# Patient Record
Sex: MALE | Race: WHITE | NOT HISPANIC OR LATINO | URBAN - METROPOLITAN AREA
[De-identification: names, ages, dates, MRNs, and addresses within clinical notes are randomized per-mention and may not be internally consistent; named-entity substitution may affect disease eponyms.]

---

## 2022-03-19 ENCOUNTER — INPATIENT (INPATIENT)
Facility: HOSPITAL | Age: 67
LOS: 10 days | Discharge: HOME HEALTH | DRG: 233 | End: 2022-03-30
Attending: THORACIC SURGERY (CARDIOTHORACIC VASCULAR SURGERY) | Admitting: INTERNAL MEDICINE
Payer: MEDICARE

## 2022-03-19 ENCOUNTER — EMERGENCY (EMERGENCY)
Facility: HOSPITAL | Age: 67
LOS: 1 days | Discharge: ANOTHER HOSPITAL | End: 2022-03-19
Attending: EMERGENCY MEDICINE | Admitting: EMERGENCY MEDICINE
Payer: MEDICARE

## 2022-03-19 VITALS
OXYGEN SATURATION: 99 % | HEIGHT: 71 IN | HEART RATE: 121 BPM | WEIGHT: 218.04 LBS | RESPIRATION RATE: 16 BRPM | DIASTOLIC BLOOD PRESSURE: 78 MMHG | TEMPERATURE: 98 F | SYSTOLIC BLOOD PRESSURE: 112 MMHG

## 2022-03-19 VITALS
HEIGHT: 71 IN | DIASTOLIC BLOOD PRESSURE: 62 MMHG | OXYGEN SATURATION: 94 % | WEIGHT: 225.97 LBS | RESPIRATION RATE: 16 BRPM | SYSTOLIC BLOOD PRESSURE: 80 MMHG | HEART RATE: 128 BPM

## 2022-03-19 VITALS
SYSTOLIC BLOOD PRESSURE: 115 MMHG | OXYGEN SATURATION: 99 % | RESPIRATION RATE: 18 BRPM | HEART RATE: 122 BPM | DIASTOLIC BLOOD PRESSURE: 77 MMHG | TEMPERATURE: 99 F

## 2022-03-19 DIAGNOSIS — R57.0 CARDIOGENIC SHOCK: ICD-10-CM

## 2022-03-19 DIAGNOSIS — I21.3 ST ELEVATION (STEMI) MYOCARDIAL INFARCTION OF UNSPECIFIED SITE: ICD-10-CM

## 2022-03-19 DIAGNOSIS — I25.10 ATHEROSCLEROTIC HEART DISEASE OF NATIVE CORONARY ARTERY WITHOUT ANGINA PECTORIS: ICD-10-CM

## 2022-03-19 LAB
ALBUMIN SERPL ELPH-MCNC: 4.2 G/DL — SIGNIFICANT CHANGE UP (ref 3.3–5)
ALP SERPL-CCNC: 97 U/L — SIGNIFICANT CHANGE UP (ref 30–120)
ALT FLD-CCNC: 63 U/L DA — HIGH (ref 10–60)
ANION GAP SERPL CALC-SCNC: 15 MMOL/L — SIGNIFICANT CHANGE UP (ref 5–17)
APPEARANCE UR: CLEAR — SIGNIFICANT CHANGE UP
APTT BLD: 26.4 SEC — LOW (ref 27.5–35.5)
APTT BLD: 31.2 SEC — SIGNIFICANT CHANGE UP (ref 27.5–35.5)
AST SERPL-CCNC: 207 U/L — HIGH (ref 10–40)
BACTERIA # UR AUTO: NEGATIVE — SIGNIFICANT CHANGE UP
BASE EXCESS BLDMV CALC-SCNC: -2.8 MMOL/L — SIGNIFICANT CHANGE UP (ref -3–3)
BASE EXCESS BLDMV CALC-SCNC: -8.2 MMOL/L — LOW (ref -3–3)
BASE EXCESS BLDMV CALC-SCNC: 0.5 MMOL/L — SIGNIFICANT CHANGE UP (ref -3–3)
BASE EXCESS BLDMV CALC-SCNC: 4.7 MMOL/L — HIGH (ref -3–3)
BASOPHILS # BLD AUTO: 0.06 K/UL — SIGNIFICANT CHANGE UP (ref 0–0.2)
BASOPHILS NFR BLD AUTO: 0.3 % — SIGNIFICANT CHANGE UP (ref 0–2)
BILIRUB SERPL-MCNC: 0.8 MG/DL — SIGNIFICANT CHANGE UP (ref 0.2–1.2)
BILIRUB UR-MCNC: NEGATIVE — SIGNIFICANT CHANGE UP
BLD GP AB SCN SERPL QL: NEGATIVE — SIGNIFICANT CHANGE UP
BUN SERPL-MCNC: 11 MG/DL — SIGNIFICANT CHANGE UP (ref 7–23)
CALCIUM SERPL-MCNC: 9.2 MG/DL — SIGNIFICANT CHANGE UP (ref 8.4–10.5)
CHLORIDE SERPL-SCNC: 90 MMOL/L — LOW (ref 96–108)
CK MB BLD-MCNC: 6 % — HIGH (ref 0–3.5)
CK MB CFR SERPL CALC: 96.1 NG/ML — HIGH (ref 0–6.7)
CK SERPL-CCNC: 1593 U/L — HIGH (ref 30–200)
CO2 BLDMV-SCNC: 19 MMOL/L — LOW (ref 21–29)
CO2 BLDMV-SCNC: 23 MMOL/L — SIGNIFICANT CHANGE UP (ref 21–29)
CO2 BLDMV-SCNC: 27 MMOL/L — SIGNIFICANT CHANGE UP (ref 21–29)
CO2 BLDMV-SCNC: 30 MMOL/L — HIGH (ref 21–29)
CO2 SERPL-SCNC: 22 MMOL/L — SIGNIFICANT CHANGE UP (ref 22–31)
COLOR SPEC: SIGNIFICANT CHANGE UP
CREAT SERPL-MCNC: 0.99 MG/DL — SIGNIFICANT CHANGE UP (ref 0.5–1.3)
D DIMER BLD IA.RAPID-MCNC: <150 NG/ML DDU — SIGNIFICANT CHANGE UP
DIFF PNL FLD: ABNORMAL
EGFR: 84 ML/MIN/1.73M2 — SIGNIFICANT CHANGE UP
EOSINOPHIL # BLD AUTO: 0 K/UL — SIGNIFICANT CHANGE UP (ref 0–0.5)
EOSINOPHIL NFR BLD AUTO: 0 % — SIGNIFICANT CHANGE UP (ref 0–6)
EPI CELLS # UR: 1 /HPF — SIGNIFICANT CHANGE UP
FIBRINOGEN PPP-MCNC: 603 MG/DL — HIGH (ref 330–520)
GAS PNL BLDA: SIGNIFICANT CHANGE UP
GAS PNL BLDMV: SIGNIFICANT CHANGE UP
GLUCOSE BLDC GLUCOMTR-MCNC: 290 MG/DL — HIGH (ref 70–99)
GLUCOSE BLDC GLUCOMTR-MCNC: 338 MG/DL — HIGH (ref 70–99)
GLUCOSE BLDC GLUCOMTR-MCNC: 383 MG/DL — HIGH (ref 70–99)
GLUCOSE BLDC GLUCOMTR-MCNC: 427 MG/DL — HIGH (ref 70–99)
GLUCOSE SERPL-MCNC: 401 MG/DL — HIGH (ref 70–99)
GLUCOSE UR QL: ABNORMAL
HCO3 BLDMV-SCNC: 18 MMOL/L — LOW (ref 20–28)
HCO3 BLDMV-SCNC: 22 MMOL/L — SIGNIFICANT CHANGE UP (ref 20–28)
HCO3 BLDMV-SCNC: 25 MMOL/L — SIGNIFICANT CHANGE UP (ref 20–28)
HCO3 BLDMV-SCNC: 29 MMOL/L — HIGH (ref 20–28)
HCT VFR BLD CALC: 51.3 % — HIGH (ref 39–50)
HGB BLD-MCNC: 16.8 G/DL — SIGNIFICANT CHANGE UP (ref 13–17)
HOROWITZ INDEX BLDMV+IHG-RTO: 36 — SIGNIFICANT CHANGE UP
HYALINE CASTS # UR AUTO: 2 /LPF — SIGNIFICANT CHANGE UP (ref 0–2)
IMM GRANULOCYTES NFR BLD AUTO: 0.3 % — SIGNIFICANT CHANGE UP (ref 0–1.5)
INR BLD: 1.19 RATIO — HIGH (ref 0.88–1.16)
INR BLD: 1.27 RATIO — HIGH (ref 0.88–1.16)
KETONES UR-MCNC: ABNORMAL
LEUKOCYTE ESTERASE UR-ACNC: NEGATIVE — SIGNIFICANT CHANGE UP
LYMPHOCYTES # BLD AUTO: 14.9 % — SIGNIFICANT CHANGE UP (ref 13–44)
LYMPHOCYTES # BLD AUTO: 2.75 K/UL — SIGNIFICANT CHANGE UP (ref 1–3.3)
MAGNESIUM SERPL-MCNC: 2 MG/DL — SIGNIFICANT CHANGE UP (ref 1.6–2.6)
MCHC RBC-ENTMCNC: 29 PG — SIGNIFICANT CHANGE UP (ref 27–34)
MCHC RBC-ENTMCNC: 32.7 GM/DL — SIGNIFICANT CHANGE UP (ref 32–36)
MCV RBC AUTO: 88.4 FL — SIGNIFICANT CHANGE UP (ref 80–100)
MONOCYTES # BLD AUTO: 0.89 K/UL — SIGNIFICANT CHANGE UP (ref 0–0.9)
MONOCYTES NFR BLD AUTO: 4.8 % — SIGNIFICANT CHANGE UP (ref 2–14)
MRSA PCR RESULT.: SIGNIFICANT CHANGE UP
NEUTROPHILS # BLD AUTO: 14.66 K/UL — HIGH (ref 1.8–7.4)
NEUTROPHILS NFR BLD AUTO: 79.7 % — HIGH (ref 43–77)
NITRITE UR-MCNC: NEGATIVE — SIGNIFICANT CHANGE UP
NRBC # BLD: 0 /100 WBCS — SIGNIFICANT CHANGE UP (ref 0–0)
NT-PROBNP SERPL-SCNC: 6715 PG/ML — HIGH (ref 0–300)
O2 CT VFR BLD CALC: 33 MMHG — SIGNIFICANT CHANGE UP (ref 30–65)
O2 CT VFR BLD CALC: 39 MMHG — SIGNIFICANT CHANGE UP (ref 30–65)
O2 CT VFR BLD CALC: 41 MMHG — SIGNIFICANT CHANGE UP (ref 30–65)
O2 CT VFR BLD CALC: 41 MMHG — SIGNIFICANT CHANGE UP (ref 30–65)
PCO2 BLDMV: 37 MMHG — SIGNIFICANT CHANGE UP (ref 30–65)
PCO2 BLDMV: 38 MMHG — SIGNIFICANT CHANGE UP (ref 30–65)
PCO2 BLDMV: 41 MMHG — SIGNIFICANT CHANGE UP (ref 30–65)
PCO2 BLDMV: 42 MMHG — SIGNIFICANT CHANGE UP (ref 30–65)
PH BLDMV: 7.28 — LOW (ref 7.32–7.45)
PH BLDMV: 7.38 — SIGNIFICANT CHANGE UP (ref 7.32–7.45)
PH BLDMV: 7.4 — SIGNIFICANT CHANGE UP (ref 7.32–7.45)
PH BLDMV: 7.45 — SIGNIFICANT CHANGE UP (ref 7.32–7.45)
PH UR: 6 — SIGNIFICANT CHANGE UP (ref 5–8)
PHOSPHATE SERPL-MCNC: 4.5 MG/DL — SIGNIFICANT CHANGE UP (ref 2.5–4.5)
PLATELET # BLD AUTO: 190 K/UL — SIGNIFICANT CHANGE UP (ref 150–400)
POTASSIUM SERPL-MCNC: 4.2 MMOL/L — SIGNIFICANT CHANGE UP (ref 3.5–5.3)
POTASSIUM SERPL-SCNC: 4.2 MMOL/L — SIGNIFICANT CHANGE UP (ref 3.5–5.3)
PROT SERPL-MCNC: 9 G/DL — HIGH (ref 6–8.3)
PROT UR-MCNC: ABNORMAL
PROTHROM AB SERPL-ACNC: 13 SEC — SIGNIFICANT CHANGE UP (ref 10.5–13.4)
PROTHROM AB SERPL-ACNC: 14.8 SEC — HIGH (ref 10.5–13.4)
RBC # BLD: 5.8 M/UL — SIGNIFICANT CHANGE UP (ref 4.2–5.8)
RBC # FLD: 12.7 % — SIGNIFICANT CHANGE UP (ref 10.3–14.5)
RBC CASTS # UR COMP ASSIST: 4 /HPF — SIGNIFICANT CHANGE UP (ref 0–4)
RH IG SCN BLD-IMP: POSITIVE — SIGNIFICANT CHANGE UP
RH IG SCN BLD-IMP: POSITIVE — SIGNIFICANT CHANGE UP
S AUREUS DNA NOSE QL NAA+PROBE: SIGNIFICANT CHANGE UP
SAO2 % BLDMV: 58.8 — LOW (ref 60–90)
SAO2 % BLDMV: 65.7 — SIGNIFICANT CHANGE UP (ref 60–90)
SAO2 % BLDMV: 71 — SIGNIFICANT CHANGE UP (ref 60–90)
SAO2 % BLDMV: 76.1 — SIGNIFICANT CHANGE UP (ref 60–90)
SARS-COV-2 RNA SPEC QL NAA+PROBE: SIGNIFICANT CHANGE UP
SODIUM SERPL-SCNC: 127 MMOL/L — LOW (ref 135–145)
SP GR SPEC: 1.04 — HIGH (ref 1.01–1.02)
TROPONIN I, HIGH SENSITIVITY RESULT: HIGH NG/L
UROBILINOGEN FLD QL: NEGATIVE — SIGNIFICANT CHANGE UP
WBC # BLD: 18.42 K/UL — HIGH (ref 3.8–10.5)
WBC # FLD AUTO: 18.42 K/UL — HIGH (ref 3.8–10.5)
WBC UR QL: 2 /HPF — SIGNIFICANT CHANGE UP (ref 0–5)

## 2022-03-19 PROCEDURE — 99152 MOD SED SAME PHYS/QHP 5/>YRS: CPT

## 2022-03-19 PROCEDURE — 99291 CRITICAL CARE FIRST HOUR: CPT

## 2022-03-19 PROCEDURE — 93010 ELECTROCARDIOGRAM REPORT: CPT

## 2022-03-19 PROCEDURE — 71045 X-RAY EXAM CHEST 1 VIEW: CPT | Mod: 26,77

## 2022-03-19 PROCEDURE — 93460 R&L HRT ART/VENTRICLE ANGIO: CPT | Mod: 26

## 2022-03-19 PROCEDURE — 36556 INSERT NON-TUNNEL CV CATH: CPT | Mod: 59

## 2022-03-19 PROCEDURE — 71045 X-RAY EXAM CHEST 1 VIEW: CPT | Mod: 26

## 2022-03-19 PROCEDURE — 33967 INSERT I-AORT PERCUT DEVICE: CPT

## 2022-03-19 PROCEDURE — 93321 DOPPLER ECHO F-UP/LMTD STD: CPT | Mod: 26

## 2022-03-19 PROCEDURE — 93503 INSERT/PLACE HEART CATHETER: CPT

## 2022-03-19 PROCEDURE — 99291 CRITICAL CARE FIRST HOUR: CPT | Mod: 25

## 2022-03-19 PROCEDURE — 36620 INSERTION CATHETER ARTERY: CPT

## 2022-03-19 PROCEDURE — 93308 TTE F-UP OR LMTD: CPT | Mod: 26

## 2022-03-19 RX ORDER — CHLORHEXIDINE GLUCONATE 213 G/1000ML
1 SOLUTION TOPICAL
Refills: 0 | Status: DISCONTINUED | OUTPATIENT
Start: 2022-03-19 | End: 2022-03-23

## 2022-03-19 RX ORDER — DEXTROSE 50 % IN WATER 50 %
25 SYRINGE (ML) INTRAVENOUS
Refills: 0 | Status: DISCONTINUED | OUTPATIENT
Start: 2022-03-19 | End: 2022-03-22

## 2022-03-19 RX ORDER — MAGNESIUM SULFATE 500 MG/ML
2 VIAL (ML) INJECTION ONCE
Refills: 0 | Status: COMPLETED | OUTPATIENT
Start: 2022-03-19 | End: 2022-03-19

## 2022-03-19 RX ORDER — DOBUTAMINE HCL 250MG/20ML
0.5 VIAL (ML) INTRAVENOUS
Qty: 500 | Refills: 0 | Status: DISCONTINUED | OUTPATIENT
Start: 2022-03-19 | End: 2022-03-20

## 2022-03-19 RX ORDER — MORPHINE SULFATE 50 MG/1
2 CAPSULE, EXTENDED RELEASE ORAL ONCE
Refills: 0 | Status: DISCONTINUED | OUTPATIENT
Start: 2022-03-19 | End: 2022-03-19

## 2022-03-19 RX ORDER — ATORVASTATIN CALCIUM 80 MG/1
80 TABLET, FILM COATED ORAL AT BEDTIME
Refills: 0 | Status: DISCONTINUED | OUTPATIENT
Start: 2022-03-19 | End: 2022-03-23

## 2022-03-19 RX ORDER — LIDOCAINE HCL 20 MG/ML
5 VIAL (ML) INJECTION ONCE
Refills: 0 | Status: COMPLETED | OUTPATIENT
Start: 2022-03-19 | End: 2022-03-19

## 2022-03-19 RX ORDER — HEPARIN SODIUM 5000 [USP'U]/ML
4000 INJECTION INTRAVENOUS; SUBCUTANEOUS EVERY 6 HOURS
Refills: 0 | Status: DISCONTINUED | OUTPATIENT
Start: 2022-03-19 | End: 2022-03-22

## 2022-03-19 RX ORDER — SODIUM CHLORIDE 9 MG/ML
3 INJECTION INTRAMUSCULAR; INTRAVENOUS; SUBCUTANEOUS EVERY 8 HOURS
Refills: 0 | Status: DISCONTINUED | OUTPATIENT
Start: 2022-03-19 | End: 2022-03-22

## 2022-03-19 RX ORDER — SODIUM CHLORIDE 9 MG/ML
10 INJECTION INTRAMUSCULAR; INTRAVENOUS; SUBCUTANEOUS
Refills: 0 | Status: DISCONTINUED | OUTPATIENT
Start: 2022-03-19 | End: 2022-03-20

## 2022-03-19 RX ORDER — ASPIRIN/CALCIUM CARB/MAGNESIUM 324 MG
81 TABLET ORAL DAILY
Refills: 0 | Status: DISCONTINUED | OUTPATIENT
Start: 2022-03-19 | End: 2022-03-23

## 2022-03-19 RX ORDER — HEPARIN SODIUM 5000 [USP'U]/ML
4000 INJECTION INTRAVENOUS; SUBCUTANEOUS ONCE
Refills: 0 | Status: COMPLETED | OUTPATIENT
Start: 2022-03-19 | End: 2022-03-19

## 2022-03-19 RX ORDER — HEPARIN SODIUM 5000 [USP'U]/ML
INJECTION INTRAVENOUS; SUBCUTANEOUS
Qty: 25000 | Refills: 0 | Status: DISCONTINUED | OUTPATIENT
Start: 2022-03-19 | End: 2022-03-22

## 2022-03-19 RX ORDER — SODIUM BICARBONATE 1 MEQ/ML
50 SYRINGE (ML) INTRAVENOUS ONCE
Refills: 0 | Status: COMPLETED | OUTPATIENT
Start: 2022-03-19 | End: 2022-03-19

## 2022-03-19 RX ORDER — INSULIN HUMAN 100 [IU]/ML
6 INJECTION, SOLUTION SUBCUTANEOUS
Qty: 100 | Refills: 0 | Status: DISCONTINUED | OUTPATIENT
Start: 2022-03-19 | End: 2022-03-23

## 2022-03-19 RX ORDER — TICAGRELOR 90 MG/1
180 TABLET ORAL ONCE
Refills: 0 | Status: COMPLETED | OUTPATIENT
Start: 2022-03-19 | End: 2022-03-19

## 2022-03-19 RX ORDER — POTASSIUM CHLORIDE 20 MEQ
10 PACKET (EA) ORAL
Refills: 0 | Status: COMPLETED | OUTPATIENT
Start: 2022-03-19 | End: 2022-03-20

## 2022-03-19 RX ORDER — HEPARIN SODIUM 5000 [USP'U]/ML
1500 INJECTION INTRAVENOUS; SUBCUTANEOUS
Qty: 25000 | Refills: 0 | Status: DISCONTINUED | OUTPATIENT
Start: 2022-03-19 | End: 2022-03-23

## 2022-03-19 RX ORDER — MILRINONE LACTATE 1 MG/ML
0.4 INJECTION, SOLUTION INTRAVENOUS
Qty: 20 | Refills: 0 | Status: DISCONTINUED | OUTPATIENT
Start: 2022-03-19 | End: 2022-03-23

## 2022-03-19 RX ORDER — POTASSIUM CHLORIDE 20 MEQ
10 PACKET (EA) ORAL
Refills: 0 | Status: COMPLETED | OUTPATIENT
Start: 2022-03-19 | End: 2022-03-19

## 2022-03-19 RX ORDER — DEXTROSE 50 % IN WATER 50 %
50 SYRINGE (ML) INTRAVENOUS
Refills: 0 | Status: DISCONTINUED | OUTPATIENT
Start: 2022-03-19 | End: 2022-03-22

## 2022-03-19 RX ORDER — FUROSEMIDE 40 MG
5 TABLET ORAL
Qty: 500 | Refills: 0 | Status: DISCONTINUED | OUTPATIENT
Start: 2022-03-19 | End: 2022-03-22

## 2022-03-19 RX ORDER — PANTOPRAZOLE SODIUM 20 MG/1
40 TABLET, DELAYED RELEASE ORAL
Refills: 0 | Status: DISCONTINUED | OUTPATIENT
Start: 2022-03-19 | End: 2022-03-23

## 2022-03-19 RX ADMIN — Medication 50 MILLIEQUIVALENT(S): at 18:13

## 2022-03-19 RX ADMIN — Medication 5 MG/HR: at 19:57

## 2022-03-19 RX ADMIN — Medication 50 MILLIEQUIVALENT(S): at 21:43

## 2022-03-19 RX ADMIN — Medication 4.61 MICROGRAM(S)/KG/MIN: at 19:57

## 2022-03-19 RX ADMIN — HEPARIN SODIUM 4000 UNIT(S): 5000 INJECTION INTRAVENOUS; SUBCUTANEOUS at 11:13

## 2022-03-19 RX ADMIN — MORPHINE SULFATE 2 MILLIGRAM(S): 50 CAPSULE, EXTENDED RELEASE ORAL at 11:43

## 2022-03-19 RX ADMIN — Medication 50 MILLIEQUIVALENT(S): at 18:26

## 2022-03-19 RX ADMIN — INSULIN HUMAN 6 UNIT(S)/HR: 100 INJECTION, SOLUTION SUBCUTANEOUS at 19:58

## 2022-03-19 RX ADMIN — SODIUM CHLORIDE 3 MILLILITER(S): 9 INJECTION INTRAMUSCULAR; INTRAVENOUS; SUBCUTANEOUS at 21:31

## 2022-03-19 RX ADMIN — Medication 25 GRAM(S): at 23:00

## 2022-03-19 RX ADMIN — MORPHINE SULFATE 2 MILLIGRAM(S): 50 CAPSULE, EXTENDED RELEASE ORAL at 11:36

## 2022-03-19 RX ADMIN — Medication 50 MILLIEQUIVALENT(S): at 23:21

## 2022-03-19 RX ADMIN — Medication 5 MILLILITER(S): at 22:13

## 2022-03-19 RX ADMIN — ATORVASTATIN CALCIUM 80 MILLIGRAM(S): 80 TABLET, FILM COATED ORAL at 21:12

## 2022-03-19 RX ADMIN — Medication 25 GRAM(S): at 19:09

## 2022-03-19 RX ADMIN — TICAGRELOR 180 MILLIGRAM(S): 90 TABLET ORAL at 11:12

## 2022-03-19 RX ADMIN — Medication 50 MILLIEQUIVALENT(S): at 21:12

## 2022-03-19 RX ADMIN — HEPARIN SODIUM 15 UNIT(S)/HR: 5000 INJECTION INTRAVENOUS; SUBCUTANEOUS at 19:35

## 2022-03-19 NOTE — H&P ADULT - NSHPLABSRESULTS_GEN_ALL_CORE
16.8   18.42 )-----------( 190      ( 19 Mar 2022 11:09 )             51.3     03-19    127<L>  |  90<L>  |  11  ----------------------------<  401<H>  4.2   |  22  |  0.99    Ca    9.2      19 Mar 2022 11:09    TPro  9.0<H>  /  Alb  4.2  /  TBili  0.8  /  DBili  x   /  AST  207<H>  /  ALT  63<H>  /  AlkPhos  97  03-19    PT/INR - ( 19 Mar 2022 11:09 )   PT: 13.0 sec;   INR: 1.19 ratio         PTT - ( 19 Mar 2022 11:09 )  PTT:31.2 sec    3/19 TTE:  EF (Visual Estimate): 20-25 %  ------------------------------------------------------------------------  Observations:  Mitral Valve: Mitral annular calcification, otherwise  normal mitral valve. Minimal mitral regurgitation.  Aortic Valve/Aorta:  Normal aortic root, aortic arch and descending thoracic  aorta.  Left Atrium: Normal left atrium.  Left Ventricle: Endocardial visualization enhanced with  intravenous injection of Ultrasonic Enhancing Agent  (Definity).  Severe global left ventricular systolic  dysfunction, most preserved in the basal to mid  anterolateral walls.  No left ventricular thrombus.  diastolic function not evaluated  Right Heart: The right ventricle is not well visualized;  grossly normal right ventricular size with decreased right  ventricular systolic function. Normal tricuspid valve.  Pericardium/Pleura: Normal pericardium with no pericardial  effusion.  Hemodynamic: Estimated right atrial pressure is 8 mm Hg.  ------------------------------------------------------------------------  Conclusions:  1. Mitral annular calcification, otherwise normal mitral  valve. Minimal mitral regurgitation.  2. Endocardial visualization enhanced with intravenous  injection of Ultrasonic Enhancing Agent (Definity).  Severe  global left ventricular systolic dysfunction, most  preserved in the basal to mid anterolateral walls.  No left  ventricular thrombus.  3. The right ventricle is not well visualized; grossly  normal right ventricular size with decreased right  ventricular systolic function.  *** No previous Echo exam.

## 2022-03-19 NOTE — ED ADULT NURSE NOTE - OBJECTIVE STATEMENT
66 YOM A&OX3 presents to ED for chest pressure. pt states has had pressure since last night. pt took 324 aspirin with little relief. EKG and pt placed on cardiac monitor shows signs of ischemia. pt placed on 2L NC. pt denies sob, n/v/d, headaches, dizziness, blurry vision. safety maintained.

## 2022-03-19 NOTE — ED PROVIDER NOTE - PROGRESS NOTE DETAILS
called [facility] card fellow requests heparin and brillinta 180mg not plavix cardiology fellow d/w dr mclean (interventional) relates xfer er->er they will eval in er and decide timing of cath pt relates pain increased, ems in er. ekg repeated. will give morphine 2 mg xfer center notified of trop

## 2022-03-19 NOTE — ED ADULT NURSE NOTE - PAIN RATING/NUMBER SCALE (0-10): REST
"Pt reports minimal relief with albuterol nebulization medications. States "I feel like there's something huge just sitting on my chest, like im under water, about to drown." BBS remain coarse with moderate inspiratory & expiratory wheezing, tight, mild air movement, continued accessory muscle use.   " 2

## 2022-03-19 NOTE — ED PROVIDER NOTE - CLINICAL SUMMARY MEDICAL DECISION MAKING FREE TEXT BOX
pt with chest pain/pressure, took aspirin 324 mg this am- ekg/xr/labs/interventional consult for possible xfer, they request brillinta and heparin

## 2022-03-19 NOTE — PROCEDURE NOTE - NSAPPROACH_GEN_A_CORE
percutaneous endoscopic
Patient lives with his wife in a house with 5 steps to enter. Prior to admission, patient ambulated with a straight cane in the community and in the house.

## 2022-03-19 NOTE — H&P ADULT - PROBLEM SELECTOR PLAN 2
- HF consult  - Dobutamine and Primacor  - New RIJ central line and katalina louie for monitoring  - Lasix drip as patient has elevated filling pressures in cath lab

## 2022-03-19 NOTE — PROCEDURE NOTE - NSINDICATIONS_GEN_A_CORE
hemodynamic monitoring/hypertonic/irritant infusion/venous access
arterial puncture to obtain ABG's/critical patient/monitoring purposes

## 2022-03-19 NOTE — H&P ADULT - ASSESSMENT
65 y/o Male with no PMH presented to [place] complaining of chest heaviness/pain since last night, associated with diaphoresis and emesis. Patient took 324 ASA this morning. EKG revealed new LBBB. Patient was transferred to [facility], [facility] and cardiac cath performed for NSTEMI which demonstrated severe multivessel CAD. Patient was in cardiogenic shock, and IABP was placed and Dobutamine drip was started. TTE demonstrated EF of 20-25%, RV size normal but decreased function. No LV thrombus. Patient was transferred and admitted to CTU for further management under Dr. Abad.

## 2022-03-19 NOTE — PROCEDURE NOTE - ADDITIONAL PROCEDURE DETAILS
Under sterile conditions and with proper draping of the patient, a pulmonary artery catheter was floated through the introducer catheter in the right internal jugular vein. With the ballon inflated with 1.5ml of air, the PA catheter was advanced while monitoring the pressure tracing from the central venous system to the right ventricle and to the pulmonary artery. The balloon was deflated, PA pressure tracing was noted again. The position of the catheter was verified by CXR. The initial readings are:  -CVP=  14  mmHg  -PA sys/odonnell=  39/20   mmHg  -C.I. = 2.0  lit/min/m^2    Complications: none    I certify that a time out took place prior to prep and drape, verbally confirming correct patient identity, correct site and side.

## 2022-03-19 NOTE — H&P ADULT - PROBLEM SELECTOR PLAN 1
Discussed with Dr. Abad  - IABP  - ASA, Lipitor  - For eventual surgical intervention, was Brilinta loaded

## 2022-03-19 NOTE — PROGRESS NOTE ADULT - SUBJECTIVE AND OBJECTIVE BOX
Patient seen and examined at the bedside.    Remained critically ill on continuous ICU monitoring.    OBJECTIVE:  Vital Signs Last 24 Hrs  T(C): 37.6 (19 Mar 2022 15:15), Max: 37.6 (19 Mar 2022 15:15)  T(F): 99.7 (19 Mar 2022 15:15), Max: 99.7 (19 Mar 2022 15:15)  HR: 146 (19 Mar 2022 17:30) (121 - 151)  BP: 79/62 (19 Mar 2022 15:05) (79/62 - 115/77)  BP(mean): 76 (19 Mar 2022 15:05) (76 - 81)  RR: 37 (19 Mar 2022 17:30) (14 - 40)  SpO2: 99% (19 Mar 2022 17:30) (87% - 100%)    REVIEW OF SYSTEMS:  CONSTITUTIONAL:  [] weakness  [] fevers [] chills  EYES/ENT:  [] visual changes  [] vertigo [] throat pain   RESPIRATORY:  [] cough [] wheezing [] hemoptysis [] No shortness of breath  CARDIOVASCULAR:  [x] chest pain [] palpitations  GASTROINTESTINAL:  [] abdominal or epigastric pain [] nausea []  vomiting [] hematemesis [] hematochezia [] melena   GENITOURINARY:  [] dysuria []  frequency [] hematuria  NEUROLOGICAL:  [] numbness [] weakness  MSKL: [] joint pain   SKIN:  [] rash    [x] All other systems negative except as marked above       Physical Exam:  General: NAD   Neurology: nonfocal   Eyes: bilateral pupils equal and reactive   ENT/Neck: Neck supple, trachea midline, No JVD   Respiratory: Rales noted bilaterally   CV: A fib, S1S2, no murmurs  Abdominal: Soft, NT, ND +BS,   Extremities: no edema, + peripheral pulses, R IABP   Skin: No Rashes, Hematoma, Ecchymosis                           Assessment:  67 y/o Male with no PMH presented to [place] complaining of chest heaviness/pain since last night, associated with diaphoresis and emesis s/p cardiac cath     NSTEMI /  Multivessel CAD  R IABP placement POD #0  Cardiogenic shock   A fib   Lactic Acidosis   Fluid overload     Plan:   ***Neuro***  [x] Nonfocal     Continue routine neuro assessment     ***Cardiovascular***  Invasive hemodynamic monitoring, assess perfusion indices   [x] R IABP 1:1 Augmentation   A fib / CVP 13 / MAP -63/ Hct 51.3% / Lactate 2.2  [] Levophed [] Vasopressin [x] Primacor [] Epinephrine [x] Dobutamine [] Mane    Volume: [x] Albumin __ [] Crystalloid __ [] PRBC __ [] Plts __ [] Cryo __ [] Plasma __ [] FEIBA __  Continuos reassessment of hemodynamics   TTE 3/19: 20-25%, severe global systolic dysfunction, RV size normal with decreased function, no LV thrombus   [x] ASA [] Plavix [x] Statin   Serial EKG and cardiac enzymes     ***Pulmonary***  Comfortable on room air, SpO2 99%  Continue to monitor SpO2 via pulse oximetry  Encourage bedside spirometry     ***GI***  [x] Diet: PO consistent carb diet.   [x] Protonix    ***Renal***  Fluid overload   Diuresis w/ IV Lasix   Continue to monitor I/Os, BUN/Creatinine.   Replete lytes PRN    ***ID***  No active antibiotic coverage     ***Endocrine***  [x] Hyperglycemia             - [x] Insulin gtt                  Patient requires continuous monitoring with bedside rhythm monitoring, pulse oximetry monitoring, and continuous central venous and arterial pressure monitoring; and intermittent blood gas analysis. Care plan discussed with the ICU care team.   Patient remained critical, at risk for life threatening decompensation.    I have spent 30 minutes providing critical care management to this patient.    By signing my name below, I, Maday Elam, attest that this documentation has been prepared under the direction and in the presence of Sahra Madison MD   Electronically signed: Evan Stark, 03-19-22 @ 18:08    I, Sahra Madison, personally performed the services described in this documentation. all medical record entries made by the scribe were at my direction and in my presence. I have reviewed the chart and agree that the record reflects my personal performance and is accurate and complete  Electronically signed: Sahra Madison MD  Patient seen and examined at the bedside.    Remained critically ill on continuous ICU monitoring.    OBJECTIVE:  Vital Signs Last 24 Hrs  T(C): 37.6 (19 Mar 2022 15:15), Max: 37.6 (19 Mar 2022 15:15)  T(F): 99.7 (19 Mar 2022 15:15), Max: 99.7 (19 Mar 2022 15:15)  HR: 146 (19 Mar 2022 17:30) (121 - 151)  BP: 79/62 (19 Mar 2022 15:05) (79/62 - 115/77)  BP(mean): 76 (19 Mar 2022 15:05) (76 - 81)  RR: 37 (19 Mar 2022 17:30) (14 - 40)  SpO2: 99% (19 Mar 2022 17:30) (87% - 100%)    REVIEW OF SYSTEMS:  CONSTITUTIONAL:  [] weakness  [] fevers [] chills  EYES/ENT:  [] visual changes  [] vertigo [] throat pain   RESPIRATORY:  [] cough [] wheezing [] hemoptysis [] No shortness of breath  CARDIOVASCULAR:  [x] chest pain [] palpitations  GASTROINTESTINAL:  [] abdominal or epigastric pain [] nausea []  vomiting [] hematemesis [] hematochezia [] melena   GENITOURINARY:  [] dysuria []  frequency [] hematuria  NEUROLOGICAL:  [] numbness [] weakness  MSKL: [] joint pain   SKIN:  [] rash    [x] All other systems negative except as marked above       Physical Exam:  General: NAD   Neurology: nonfocal   Eyes: bilateral pupils equal and reactive   ENT/Neck: Neck supple, trachea midline, No JVD   Respiratory: Rales noted bilaterally   CV: A fib, S1S2, no murmurs  Abdominal: Soft, NT, ND +BS,   Extremities: no edema, + peripheral pulses, R IABP   Skin: No Rashes, Hematoma, Ecchymosis                           Assessment:  65 y/o Male with no PMH presented to [place] complaining of chest heaviness/pain since last night, associated with diaphoresis and emesis s/p cardiac cath     NSTEMI /  Multivessel CAD  R IABP placement POD #0  Cardiogenic shock   A fib   Lactic Acidosis     Plan:   ***Neuro***  [x] Nonfocal     Continue routine neuro assessment     ***Cardiovascular***  Invasive hemodynamic monitoring, assess perfusion indices   [x] R IABP 1:1 Augmentation   A fib / CVP 13 / MAP -63/ Hct 51.3% / Lactate 2.2  [x] Primacor  [x] Dobutamine    Volume: [x] Albumin   Continuos reassessment of hemodynamics   TTE 3/19: 20-25%, severe global systolic dysfunction, RV size normal with decreased function, no LV thrombus   [x] ASA [] Plavix [x] Statin   Serial EKG and cardiac enzymes     ***Pulmonary***  Comfortable on room air, SpO2 99%  Continue to monitor SpO2 via pulse oximetry  Encourage bedside spirometry     ***GI***  [x] Diet: PO consistent carb diet.   [x] Protonix    ***Renal***  Diuresis w/ IV Lasix   Continue to monitor I/Os, BUN/Creatinine.   Replete lytes PRN  Med presents:[x] Positive     ***ID***  No active antibiotic coverage     ***Endocrine***  [x] Hyperglycemia             - [x] Insulin gtt                  Patient requires continuous monitoring with bedside rhythm monitoring, pulse oximetry monitoring, and continuous central venous and arterial pressure monitoring; and intermittent blood gas analysis. Care plan discussed with the ICU care team.   Patient remained critical, at risk for life threatening decompensation.    I have spent 30 minutes providing critical care management to this patient.    By signing my name below, I, Maday Elam, attest that this documentation has been prepared under the direction and in the presence of Sahra Madison MD   Electronically signed: Evan Stark, 03-19-22 @ 18:08    I, Sahra Madison, personally performed the services described in this documentation. all medical record entries made by the scribe were at my direction and in my presence. I have reviewed the chart and agree that the record reflects my personal performance and is accurate and complete  Electronically signed: Sahra Madison MD  Patient seen and examined at the bedside.    Remained critically ill on continuous ICU monitoring.    OBJECTIVE:  Vital Signs Last 24 Hrs  T(C): 37.6 (19 Mar 2022 15:15), Max: 37.6 (19 Mar 2022 15:15)  T(F): 99.7 (19 Mar 2022 15:15), Max: 99.7 (19 Mar 2022 15:15)  HR: 146 (19 Mar 2022 17:30) (121 - 151)  BP: 79/62 (19 Mar 2022 15:05) (79/62 - 115/77)  BP(mean): 76 (19 Mar 2022 15:05) (76 - 81)  RR: 37 (19 Mar 2022 17:30) (14 - 40)  SpO2: 99% (19 Mar 2022 17:30) (87% - 100%)    REVIEW OF SYSTEMS:  CONSTITUTIONAL:  [] weakness  [] fevers [] chills  EYES/ENT:  [] visual changes  [] vertigo [] throat pain   RESPIRATORY:  [] cough [] wheezing [] hemoptysis [] No shortness of breath  CARDIOVASCULAR:  [x] chest pain [] palpitations  GASTROINTESTINAL:  [] abdominal or epigastric pain [] nausea []  vomiting [] hematemesis [] hematochezia [] melena   GENITOURINARY:  [] dysuria []  frequency [] hematuria  NEUROLOGICAL:  [] numbness [] weakness  MSKL: [] joint pain   SKIN:  [] rash    [x] All other systems negative except as marked above       Physical Exam:  General: NAD   Neurology: nonfocal   Eyes: bilateral pupils equal and reactive   ENT/Neck: Neck supple, trachea midline, No JVD   Respiratory: Rales noted bilaterally   CV: A fib, S1S2, no murmurs  Abdominal: Soft, NT, ND +BS,   Extremities: no edema, + peripheral pulses, R IABP   Skin: No Rashes, Hematoma, Ecchymosis                           Assessment:  67 y/o Male with no PMH presented to [place] complaining of chest heaviness/pain since last night, associated with diaphoresis and emesis s/p cardiac cath     NSTEMI /  Multivessel CAD  R IABP placement POD #0  Cardiogenic shock   A fib   Lactic Acidosis     Plan:   ***Neuro***  [x] Nonfocal     Continue routine neuro assessment     ***Cardiovascular***  Invasive hemodynamic monitoring, assess perfusion indices   [x] R IABP 1:1 Augmentation   A fib / CVP 13 / MAP -63/ Hct 51.3% / Lactate 2.2  [x] Primacor  [x] Dobutamine    Volume: [x] Albumin   Continuos reassessment of hemodynamics   TTE 3/19: 20-25%, severe global systolic dysfunction, RV size normal with decreased function, no LV thrombus   [x] ASA [x] Statin   Serial EKG and cardiac enzymes     ***Pulmonary***  Comfortable on room air, SpO2 99%  Continue to monitor SpO2 via pulse oximetry  Encourage bedside spirometry     ***GI***  [x] Diet: PO consistent carb diet.   [x] Protonix    ***Renal***  Diuresis w/ IV Lasix   Continue to monitor I/Os, BUN/Creatinine.   Replete lytes PRN  Med presents:[x] Positive     ***ID***  No active antibiotic coverage     ***Endocrine***  [x] Hyperglycemia             - [x] Insulin gtt                  Patient requires continuous monitoring with bedside rhythm monitoring, pulse oximetry monitoring, and continuous central venous and arterial pressure monitoring; and intermittent blood gas analysis. Care plan discussed with the ICU care team.   Patient remained critical, at risk for life threatening decompensation.    I have spent 30 minutes providing critical care management to this patient.    By signing my name below, I, Maday Elam, attest that this documentation has been prepared under the direction and in the presence of Sahra Madison MD   Electronically signed: Evan Stark, 03-19-22 @ 18:08    I, Sahra Madison, personally performed the services described in this documentation. all medical record entries made by the scribe were at my direction and in my presence. I have reviewed the chart and agree that the record reflects my personal performance and is accurate and complete  Electronically signed: Sahra Madison MD  Patient seen and examined at the bedside.    Remained critically ill on continuous ICU monitoring.    OBJECTIVE:  Vital Signs Last 24 Hrs  T(C): 37.6 (19 Mar 2022 15:15), Max: 37.6 (19 Mar 2022 15:15)  T(F): 99.7 (19 Mar 2022 15:15), Max: 99.7 (19 Mar 2022 15:15)  HR: 146 (19 Mar 2022 17:30) (121 - 151)  BP: 79/62 (19 Mar 2022 15:05) (79/62 - 115/77)  BP(mean): 76 (19 Mar 2022 15:05) (76 - 81)  RR: 37 (19 Mar 2022 17:30) (14 - 40)  SpO2: 99% (19 Mar 2022 17:30) (87% - 100%)    REVIEW OF SYSTEMS:  CONSTITUTIONAL:  [x] weakness  [] fevers [] chills  EYES/ENT:  [] visual changes  [] vertigo [] throat pain   RESPIRATORY:  [] cough [] wheezing [] hemoptysis [] No shortness of breath  CARDIOVASCULAR:  [x] chest pain [] palpitations  GASTROINTESTINAL:  [] abdominal or epigastric pain [] nausea []  vomiting [] hematemesis [] hematochezia [] melena   GENITOURINARY:  [] dysuria []  frequency [] hematuria  NEUROLOGICAL:  [] numbness [] weakness  MSKL: [] joint pain   SKIN:  [] rash    [x] All other systems negative except as marked above       Physical Exam:  General: in bed with multiple lines, gtt IABP support   Neurology: nonfocal   Eyes: bilateral pupils equal and reactive   ENT/Neck: Neck supple, trachea midline, No JVD   Respiratory: Rales noted bilaterally   CV: ST   Abdominal: Soft, NT, ND +BS,   Extremities: no edema, + peripheral pulses, R IABP   Skin: No Rashes, Hematoma, Ecchymosis                           Assessment:  67 y/o Male     STEMI /  Multivessel CAD / complicated with cardiogenic shock   S/P IABP placement for severe LV Systolic failure  Lactic acidosis  Hyperglycemia       Plan:   ***Neuro***  [x] Nonfocal     Continue routine neuro assessment     ***Cardiovascular***  Invasive hemodynamic monitoring, assess perfusion indices   [x] R IABP 1:1 Augmentation   A fib/ -140  / CVP 13 / PAP 30/17 / CI 1.5 / MAP -63/ Hct 51.3% / svo2 58 Lactate 2.8  [x] Primacor 0.5 mcg/kg/min  [x] Dobutamine  1 mcg/kg/min [x] Heparin gtt [x] Lasix 5mg/hr [x] IABP 1;1 Augmentation   Continuos reassessment of hemodynamics trend perfusion indices   TTE 3/19: 20-25%, severe global systolic dysfunction, RV size normal with decreased function, no LV thrombus   [x] ASA [x] Statin   Monitor RLE Perfusion & any bleeding complication  Serial EKG and cardiac enzymes     ***Pulmonary***  Supplemental O2   Continue to monitor SpO2 via pulse oximetry  Encourage bedside spirometry     ***GI***  [x] Diet: PO consistent carb diet.   [x] Protonix    ***Renal***  Acidosis S/P Sodium bicarbonate 100 mEq x1   Diuresis   Lasix   Continue to monitor I/Os, BUN/Creatinine.   Replete lytes JAMISONN  Med presents:[x] Positive     ***ID***  No active antibiotic coverage     ***Endocrine***  [x] Hyperglycemia             - [x] Insulin gtt                  Patient requires continuous monitoring with bedside rhythm monitoring, pulse oximetry monitoring, and continuous central venous and arterial pressure monitoring; and intermittent blood gas analysis. Care plan discussed with the ICU care team.   Patient remained critical, at risk for life threatening decompensation.    I have spent 30 minutes providing critical care management to this patient.    By signing my name below, I, Maday Elam, attest that this documentation has been prepared under the direction and in the presence of Sahra Madison MD   Electronically signed: Evan Stark, 03-19-22 @ 18:08    I, Sahra Madison, personally performed the services described in this documentation. all medical record entries made by the pauloibmehnaz were at my direction and in my presence. I have reviewed the chart and agree that the record reflects my personal performance and is accurate and complete  Electronically signed: Sahra Madison MD

## 2022-03-19 NOTE — H&P ADULT - NSHPREVIEWOFSYSTEMS_GEN_ALL_CORE
General: Denies fever, chills  Neuro: Denies headache, paresthesias, dizziness  Eyes: Denies blurry vision, diplopia  CV: Admits to mild chest pain. Denies palpitations  Resp: Denies SOB, cough  : Denies dysuria, hematuria  GI: Denies abdominal pain, N/V  Heme: Denies weakness, tiredness  MSK: Denies joint pain  Psych: Denies anxiety, depression, blessing  Vasc: Denies leg pain, claudication

## 2022-03-19 NOTE — H&P ADULT - HISTORY OF PRESENT ILLNESS
67 y/o Male with no PMH presented to [place] complaining of chest heaviness/pain since last night, associated with diaphoresis and emesis. Patient took 324 ASA this morning. EKG revealed new LBBB. Patient was transferred to [facility], [facility] and cardiac cath performed for NSTEMI which demonstrated severe multivessel CAD. Patient was in cardiogenic shock, and IABP was placed and Dobutamine drip was started. TTE demonstrated EF of 20-25%, RV size normal but decreased function. No LV thrombus. Patient was transferred and admitted to CTU for further management under Dr. Abad.

## 2022-03-19 NOTE — H&P ADULT - NSHPPHYSICALEXAM_GEN_ALL_CORE
Physical Exam:  General: Pleasant appearing in NAD.  Neuro: AAO x4, no deficits noted.  Skin: No erythema or cyanosis.  Head: NCAT.  Eyes: Pupils equal and reactive.  Neck: No JVD.  CV: Tachycardic.  Pulm: CTABL, no wheezing, rhonchi  : Jovel in place making good urine. No hematuria.  GI: Abd soft, NT, ND, positive BS throughout.  Vasc: Groins soft without bleeding or hematoma. Left DP pulse 2+, Right DP pulse 1+.  MSK: Equal ROM throughout.

## 2022-03-19 NOTE — ED PROVIDER NOTE - OBJECTIVE STATEMENT
pt with intermittent chest pressure since last night, currently 2/10 intensity. pt reports right sided jaw pain, but he relates that is chronic x yrs, doesn't seem to be related to his chest pressure. + recent travel to [place]. pt took aspirin 324mg this am.  no fevers, chills, ha, d/n/v, sob, cough, abd pain, edema, calf pain. 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 pt requests [facility] when I discussed possible transfer locations with him.

## 2022-03-20 DIAGNOSIS — I21.4 NON-ST ELEVATION (NSTEMI) MYOCARDIAL INFARCTION: ICD-10-CM

## 2022-03-20 DIAGNOSIS — I50.20 UNSPECIFIED SYSTOLIC (CONGESTIVE) HEART FAILURE: ICD-10-CM

## 2022-03-20 DIAGNOSIS — E11.9 TYPE 2 DIABETES MELLITUS WITHOUT COMPLICATIONS: ICD-10-CM

## 2022-03-20 LAB
A1C WITH ESTIMATED AVERAGE GLUCOSE RESULT: 11.4 % — HIGH (ref 4–5.6)
ALBUMIN SERPL ELPH-MCNC: 3.8 G/DL — SIGNIFICANT CHANGE UP (ref 3.3–5)
ALP SERPL-CCNC: 68 U/L — SIGNIFICANT CHANGE UP (ref 40–120)
ALT FLD-CCNC: 59 U/L — HIGH (ref 10–45)
ANION GAP SERPL CALC-SCNC: 12 MMOL/L — SIGNIFICANT CHANGE UP (ref 5–17)
APPEARANCE UR: ABNORMAL
APTT BLD: 101.7 SEC — HIGH (ref 27.5–35.5)
APTT BLD: 61.3 SEC — HIGH (ref 27.5–35.5)
APTT BLD: 90.8 SEC — HIGH (ref 27.5–35.5)
APTT BLD: 94.9 SEC — HIGH (ref 27.5–35.5)
AST SERPL-CCNC: 234 U/L — HIGH (ref 10–40)
BACTERIA # UR AUTO: NEGATIVE — SIGNIFICANT CHANGE UP
BASE EXCESS BLDMV CALC-SCNC: 2.4 MMOL/L — SIGNIFICANT CHANGE UP (ref -3–3)
BASE EXCESS BLDMV CALC-SCNC: 2.7 MMOL/L — SIGNIFICANT CHANGE UP (ref -3–3)
BASE EXCESS BLDMV CALC-SCNC: 3.4 MMOL/L — HIGH (ref -3–3)
BASE EXCESS BLDMV CALC-SCNC: 3.7 MMOL/L — HIGH (ref -3–3)
BASE EXCESS BLDMV CALC-SCNC: 4.2 MMOL/L — HIGH (ref -3–3)
BASE EXCESS BLDMV CALC-SCNC: 7.1 MMOL/L — HIGH (ref -3–3)
BILIRUB SERPL-MCNC: 0.5 MG/DL — SIGNIFICANT CHANGE UP (ref 0.2–1.2)
BILIRUB UR-MCNC: NEGATIVE — SIGNIFICANT CHANGE UP
BUN SERPL-MCNC: 15 MG/DL — SIGNIFICANT CHANGE UP (ref 7–23)
CALCIUM SERPL-MCNC: 8.5 MG/DL — SIGNIFICANT CHANGE UP (ref 8.4–10.5)
CHLORIDE SERPL-SCNC: 93 MMOL/L — LOW (ref 96–108)
CK MB BLD-MCNC: 4.9 % — HIGH (ref 0–3.5)
CK MB CFR SERPL CALC: 97.5 NG/ML — HIGH (ref 0–6.7)
CK SERPL-CCNC: 2010 U/L — HIGH (ref 30–200)
CO2 BLDMV-SCNC: 28 MMOL/L — SIGNIFICANT CHANGE UP (ref 21–29)
CO2 BLDMV-SCNC: 29 MMOL/L — SIGNIFICANT CHANGE UP (ref 21–29)
CO2 BLDMV-SCNC: 30 MMOL/L — HIGH (ref 21–29)
CO2 BLDMV-SCNC: 30 MMOL/L — HIGH (ref 21–29)
CO2 BLDMV-SCNC: 31 MMOL/L — HIGH (ref 21–29)
CO2 BLDMV-SCNC: 33 MMOL/L — HIGH (ref 21–29)
CO2 SERPL-SCNC: 26 MMOL/L — SIGNIFICANT CHANGE UP (ref 22–31)
COLOR SPEC: ABNORMAL
COMMENT - URINE: SIGNIFICANT CHANGE UP
CREAT SERPL-MCNC: 0.78 MG/DL — SIGNIFICANT CHANGE UP (ref 0.5–1.3)
DIFF PNL FLD: ABNORMAL
EGFR: 98 ML/MIN/1.73M2 — SIGNIFICANT CHANGE UP
EPI CELLS # UR: 3 — SIGNIFICANT CHANGE UP
ESTIMATED AVERAGE GLUCOSE: 280 MG/DL — HIGH (ref 68–114)
GAS PNL BLDA: SIGNIFICANT CHANGE UP
GAS PNL BLDMV: SIGNIFICANT CHANGE UP
GLUCOSE BLDC GLUCOMTR-MCNC: 100 MG/DL — HIGH (ref 70–99)
GLUCOSE BLDC GLUCOMTR-MCNC: 116 MG/DL — HIGH (ref 70–99)
GLUCOSE BLDC GLUCOMTR-MCNC: 134 MG/DL — HIGH (ref 70–99)
GLUCOSE BLDC GLUCOMTR-MCNC: 135 MG/DL — HIGH (ref 70–99)
GLUCOSE BLDC GLUCOMTR-MCNC: 142 MG/DL — HIGH (ref 70–99)
GLUCOSE BLDC GLUCOMTR-MCNC: 148 MG/DL — HIGH (ref 70–99)
GLUCOSE BLDC GLUCOMTR-MCNC: 148 MG/DL — HIGH (ref 70–99)
GLUCOSE BLDC GLUCOMTR-MCNC: 159 MG/DL — HIGH (ref 70–99)
GLUCOSE BLDC GLUCOMTR-MCNC: 161 MG/DL — HIGH (ref 70–99)
GLUCOSE BLDC GLUCOMTR-MCNC: 163 MG/DL — HIGH (ref 70–99)
GLUCOSE BLDC GLUCOMTR-MCNC: 169 MG/DL — HIGH (ref 70–99)
GLUCOSE BLDC GLUCOMTR-MCNC: 181 MG/DL — HIGH (ref 70–99)
GLUCOSE BLDC GLUCOMTR-MCNC: 184 MG/DL — HIGH (ref 70–99)
GLUCOSE BLDC GLUCOMTR-MCNC: 188 MG/DL — HIGH (ref 70–99)
GLUCOSE BLDC GLUCOMTR-MCNC: 190 MG/DL — HIGH (ref 70–99)
GLUCOSE BLDC GLUCOMTR-MCNC: 190 MG/DL — HIGH (ref 70–99)
GLUCOSE BLDC GLUCOMTR-MCNC: 199 MG/DL — HIGH (ref 70–99)
GLUCOSE BLDC GLUCOMTR-MCNC: 220 MG/DL — HIGH (ref 70–99)
GLUCOSE BLDC GLUCOMTR-MCNC: 258 MG/DL — HIGH (ref 70–99)
GLUCOSE SERPL-MCNC: 256 MG/DL — HIGH (ref 70–99)
GLUCOSE UR QL: NEGATIVE — SIGNIFICANT CHANGE UP
HCO3 BLDMV-SCNC: 27 MMOL/L — SIGNIFICANT CHANGE UP (ref 20–28)
HCO3 BLDMV-SCNC: 28 MMOL/L — SIGNIFICANT CHANGE UP (ref 20–28)
HCO3 BLDMV-SCNC: 29 MMOL/L — HIGH (ref 20–28)
HCO3 BLDMV-SCNC: 32 MMOL/L — HIGH (ref 20–28)
HCT VFR BLD CALC: 38.6 % — LOW (ref 39–50)
HCV AB S/CO SERPL IA: 0.09 S/CO — SIGNIFICANT CHANGE UP (ref 0–0.99)
HCV AB SERPL-IMP: SIGNIFICANT CHANGE UP
HGB BLD-MCNC: 13.1 G/DL — SIGNIFICANT CHANGE UP (ref 13–17)
HOROWITZ INDEX BLDMV+IHG-RTO: 36 — SIGNIFICANT CHANGE UP
HYALINE CASTS # UR AUTO: 1 /LPF — SIGNIFICANT CHANGE UP (ref 0–7)
INR BLD: 1.25 RATIO — HIGH (ref 0.88–1.16)
KETONES UR-MCNC: NEGATIVE — SIGNIFICANT CHANGE UP
LDH SERPL L TO P-CCNC: 1112 U/L — HIGH (ref 50–242)
LDH SERPL L TO P-CCNC: 740 U/L — HIGH (ref 50–242)
LEUKOCYTE ESTERASE UR-ACNC: NEGATIVE — SIGNIFICANT CHANGE UP
MAGNESIUM SERPL-MCNC: 2.7 MG/DL — HIGH (ref 1.6–2.6)
MCHC RBC-ENTMCNC: 29.4 PG — SIGNIFICANT CHANGE UP (ref 27–34)
MCHC RBC-ENTMCNC: 33.9 GM/DL — SIGNIFICANT CHANGE UP (ref 32–36)
MCV RBC AUTO: 86.7 FL — SIGNIFICANT CHANGE UP (ref 80–100)
NITRITE UR-MCNC: NEGATIVE — SIGNIFICANT CHANGE UP
NRBC # BLD: 0 /100 WBCS — SIGNIFICANT CHANGE UP (ref 0–0)
NT-PROBNP SERPL-SCNC: 7087 PG/ML — HIGH (ref 0–300)
O2 CT VFR BLD CALC: 35 MMHG — SIGNIFICANT CHANGE UP (ref 30–65)
O2 CT VFR BLD CALC: 38 MMHG — SIGNIFICANT CHANGE UP (ref 30–65)
O2 CT VFR BLD CALC: 39 MMHG — SIGNIFICANT CHANGE UP (ref 30–65)
O2 CT VFR BLD CALC: 42 MMHG — SIGNIFICANT CHANGE UP (ref 30–65)
PCO2 BLDMV: 42 MMHG — SIGNIFICANT CHANGE UP (ref 30–65)
PCO2 BLDMV: 43 MMHG — SIGNIFICANT CHANGE UP (ref 30–65)
PCO2 BLDMV: 44 MMHG — SIGNIFICANT CHANGE UP (ref 30–65)
PCO2 BLDMV: 45 MMHG — SIGNIFICANT CHANGE UP (ref 30–65)
PH BLDMV: 7.41 — SIGNIFICANT CHANGE UP (ref 7.32–7.45)
PH BLDMV: 7.42 — SIGNIFICANT CHANGE UP (ref 7.32–7.45)
PH BLDMV: 7.42 — SIGNIFICANT CHANGE UP (ref 7.32–7.45)
PH BLDMV: 7.43 — SIGNIFICANT CHANGE UP (ref 7.32–7.45)
PH BLDMV: 7.43 — SIGNIFICANT CHANGE UP (ref 7.32–7.45)
PH BLDMV: 7.46 — HIGH (ref 7.32–7.45)
PH UR: 6.5 — SIGNIFICANT CHANGE UP (ref 5–8)
PHOSPHATE SERPL-MCNC: 1.9 MG/DL — LOW (ref 2.5–4.5)
PLATELET # BLD AUTO: 264 K/UL — SIGNIFICANT CHANGE UP (ref 150–400)
POTASSIUM SERPL-MCNC: 3.5 MMOL/L — SIGNIFICANT CHANGE UP (ref 3.5–5.3)
POTASSIUM SERPL-SCNC: 3.5 MMOL/L — SIGNIFICANT CHANGE UP (ref 3.5–5.3)
PROT SERPL-MCNC: 6.4 G/DL — SIGNIFICANT CHANGE UP (ref 6–8.3)
PROT UR-MCNC: ABNORMAL
PROTHROM AB SERPL-ACNC: 14.5 SEC — HIGH (ref 10.5–13.4)
RBC # BLD: 4.45 M/UL — SIGNIFICANT CHANGE UP (ref 4.2–5.8)
RBC # FLD: 12.4 % — SIGNIFICANT CHANGE UP (ref 10.3–14.5)
RBC CASTS # UR COMP ASSIST: >50 /HPF — HIGH (ref 0–4)
SAO2 % BLDMV: 63 — SIGNIFICANT CHANGE UP (ref 60–90)
SAO2 % BLDMV: 70 — SIGNIFICANT CHANGE UP (ref 60–90)
SAO2 % BLDMV: 70.6 — SIGNIFICANT CHANGE UP (ref 60–90)
SAO2 % BLDMV: 72.2 — SIGNIFICANT CHANGE UP (ref 60–90)
SAO2 % BLDMV: 72.5 — SIGNIFICANT CHANGE UP (ref 60–90)
SAO2 % BLDMV: 76.1 — SIGNIFICANT CHANGE UP (ref 60–90)
SODIUM SERPL-SCNC: 131 MMOL/L — LOW (ref 135–145)
SP GR SPEC: 1.01 — SIGNIFICANT CHANGE UP (ref 1.01–1.02)
TROPONIN T, HIGH SENSITIVITY RESULT: 3191 NG/L — HIGH (ref 0–51)
TROPONIN T, HIGH SENSITIVITY RESULT: 3580 NG/L — HIGH (ref 0–51)
TSH SERPL-MCNC: 0.78 UIU/ML — SIGNIFICANT CHANGE UP (ref 0.27–4.2)
UROBILINOGEN FLD QL: NEGATIVE — SIGNIFICANT CHANGE UP
WBC # BLD: 18.48 K/UL — HIGH (ref 3.8–10.5)
WBC # FLD AUTO: 18.48 K/UL — HIGH (ref 3.8–10.5)
WBC UR QL: 2 /HPF — SIGNIFICANT CHANGE UP (ref 0–5)

## 2022-03-20 PROCEDURE — 99232 SBSQ HOSP IP/OBS MODERATE 35: CPT

## 2022-03-20 PROCEDURE — 99223 1ST HOSP IP/OBS HIGH 75: CPT

## 2022-03-20 PROCEDURE — 99292 CRITICAL CARE ADDL 30 MIN: CPT

## 2022-03-20 PROCEDURE — 93882 EXTRACRANIAL UNI/LTD STUDY: CPT | Mod: 26,LT

## 2022-03-20 PROCEDURE — 99291 CRITICAL CARE FIRST HOUR: CPT

## 2022-03-20 PROCEDURE — 71045 X-RAY EXAM CHEST 1 VIEW: CPT | Mod: 26

## 2022-03-20 RX ORDER — SENNA PLUS 8.6 MG/1
2 TABLET ORAL AT BEDTIME
Refills: 0 | Status: DISCONTINUED | OUTPATIENT
Start: 2022-03-20 | End: 2022-03-23

## 2022-03-20 RX ORDER — VASOPRESSIN 20 [USP'U]/ML
0.03 INJECTION INTRAVENOUS
Qty: 50 | Refills: 0 | Status: DISCONTINUED | OUTPATIENT
Start: 2022-03-20 | End: 2022-03-22

## 2022-03-20 RX ORDER — ALBUMIN HUMAN 25 %
50 VIAL (ML) INTRAVENOUS ONCE
Refills: 0 | Status: COMPLETED | OUTPATIENT
Start: 2022-03-20 | End: 2022-03-20

## 2022-03-20 RX ORDER — HYDROMORPHONE HYDROCHLORIDE 2 MG/ML
0.25 INJECTION INTRAMUSCULAR; INTRAVENOUS; SUBCUTANEOUS ONCE
Refills: 0 | Status: DISCONTINUED | OUTPATIENT
Start: 2022-03-20 | End: 2022-03-20

## 2022-03-20 RX ORDER — POTASSIUM CHLORIDE 20 MEQ
10 PACKET (EA) ORAL
Refills: 0 | Status: COMPLETED | OUTPATIENT
Start: 2022-03-20 | End: 2022-03-20

## 2022-03-20 RX ORDER — ACETAMINOPHEN 500 MG
650 TABLET ORAL ONCE
Refills: 0 | Status: COMPLETED | OUTPATIENT
Start: 2022-03-20 | End: 2022-03-20

## 2022-03-20 RX ORDER — ONDANSETRON 8 MG/1
4 TABLET, FILM COATED ORAL ONCE
Refills: 0 | Status: COMPLETED | OUTPATIENT
Start: 2022-03-20 | End: 2022-03-20

## 2022-03-20 RX ORDER — CALCIUM GLUCONATE 100 MG/ML
2 VIAL (ML) INTRAVENOUS ONCE
Refills: 0 | Status: COMPLETED | OUTPATIENT
Start: 2022-03-20 | End: 2022-03-20

## 2022-03-20 RX ORDER — POTASSIUM CHLORIDE 20 MEQ
40 PACKET (EA) ORAL ONCE
Refills: 0 | Status: COMPLETED | OUTPATIENT
Start: 2022-03-20 | End: 2022-03-20

## 2022-03-20 RX ORDER — HYDROMORPHONE HYDROCHLORIDE 2 MG/ML
0.5 INJECTION INTRAMUSCULAR; INTRAVENOUS; SUBCUTANEOUS ONCE
Refills: 0 | Status: DISCONTINUED | OUTPATIENT
Start: 2022-03-20 | End: 2022-03-20

## 2022-03-20 RX ORDER — LIDOCAINE 4 G/100G
1 CREAM TOPICAL EVERY 24 HOURS
Refills: 0 | Status: DISCONTINUED | OUTPATIENT
Start: 2022-03-20 | End: 2022-03-23

## 2022-03-20 RX ADMIN — Medication 50 MILLIEQUIVALENT(S): at 00:33

## 2022-03-20 RX ADMIN — ATORVASTATIN CALCIUM 80 MILLIGRAM(S): 80 TABLET, FILM COATED ORAL at 21:38

## 2022-03-20 RX ADMIN — Medication 50 MILLIEQUIVALENT(S): at 04:41

## 2022-03-20 RX ADMIN — Medication 62.5 MILLIMOLE(S): at 03:15

## 2022-03-20 RX ADMIN — Medication 50 MILLILITER(S): at 16:57

## 2022-03-20 RX ADMIN — SENNA PLUS 2 TABLET(S): 8.6 TABLET ORAL at 21:38

## 2022-03-20 RX ADMIN — Medication 200 GRAM(S): at 13:55

## 2022-03-20 RX ADMIN — Medication 50 MILLIEQUIVALENT(S): at 05:19

## 2022-03-20 RX ADMIN — HEPARIN SODIUM 16.5 UNIT(S)/HR: 5000 INJECTION INTRAVENOUS; SUBCUTANEOUS at 21:42

## 2022-03-20 RX ADMIN — Medication 50 MILLIEQUIVALENT(S): at 06:11

## 2022-03-20 RX ADMIN — INSULIN HUMAN 6 UNIT(S)/HR: 100 INJECTION, SOLUTION SUBCUTANEOUS at 21:41

## 2022-03-20 RX ADMIN — Medication 50 MILLIEQUIVALENT(S): at 06:00

## 2022-03-20 RX ADMIN — Medication 2.5 MG/HR: at 21:41

## 2022-03-20 RX ADMIN — Medication 50 MILLIEQUIVALENT(S): at 01:35

## 2022-03-20 RX ADMIN — Medication 650 MILLIGRAM(S): at 12:04

## 2022-03-20 RX ADMIN — Medication 50 MILLIEQUIVALENT(S): at 08:29

## 2022-03-20 RX ADMIN — MILRINONE LACTATE 12.3 MICROGRAM(S)/KG/MIN: 1 INJECTION, SOLUTION INTRAVENOUS at 21:41

## 2022-03-20 RX ADMIN — Medication 650 MILLIGRAM(S): at 12:30

## 2022-03-20 RX ADMIN — Medication 50 MILLILITER(S): at 00:20

## 2022-03-20 RX ADMIN — HYDROMORPHONE HYDROCHLORIDE 0.25 MILLIGRAM(S): 2 INJECTION INTRAMUSCULAR; INTRAVENOUS; SUBCUTANEOUS at 03:38

## 2022-03-20 RX ADMIN — Medication 650 MILLIGRAM(S): at 00:30

## 2022-03-20 RX ADMIN — SODIUM CHLORIDE 3 MILLILITER(S): 9 INJECTION INTRAMUSCULAR; INTRAVENOUS; SUBCUTANEOUS at 21:15

## 2022-03-20 RX ADMIN — CHLORHEXIDINE GLUCONATE 1 APPLICATION(S): 213 SOLUTION TOPICAL at 05:19

## 2022-03-20 RX ADMIN — Medication 50 MILLIEQUIVALENT(S): at 10:37

## 2022-03-20 RX ADMIN — Medication 40 MILLIEQUIVALENT(S): at 10:36

## 2022-03-20 RX ADMIN — VASOPRESSIN 2 UNIT(S)/MIN: 20 INJECTION INTRAVENOUS at 21:41

## 2022-03-20 RX ADMIN — PANTOPRAZOLE SODIUM 40 MILLIGRAM(S): 20 TABLET, DELAYED RELEASE ORAL at 06:11

## 2022-03-20 RX ADMIN — SODIUM CHLORIDE 3 MILLILITER(S): 9 INJECTION INTRAMUSCULAR; INTRAVENOUS; SUBCUTANEOUS at 05:15

## 2022-03-20 RX ADMIN — ONDANSETRON 4 MILLIGRAM(S): 8 TABLET, FILM COATED ORAL at 12:04

## 2022-03-20 RX ADMIN — Medication 50 MILLIEQUIVALENT(S): at 10:54

## 2022-03-20 RX ADMIN — HYDROMORPHONE HYDROCHLORIDE 0.5 MILLIGRAM(S): 2 INJECTION INTRAMUSCULAR; INTRAVENOUS; SUBCUTANEOUS at 19:40

## 2022-03-20 RX ADMIN — Medication 2.5 MG/HR: at 12:26

## 2022-03-20 RX ADMIN — Medication 50 MILLIEQUIVALENT(S): at 08:40

## 2022-03-20 RX ADMIN — Medication 81 MILLIGRAM(S): at 12:04

## 2022-03-20 RX ADMIN — Medication 650 MILLIGRAM(S): at 00:00

## 2022-03-20 RX ADMIN — HYDROMORPHONE HYDROCHLORIDE 0.5 MILLIGRAM(S): 2 INJECTION INTRAMUSCULAR; INTRAVENOUS; SUBCUTANEOUS at 19:11

## 2022-03-20 RX ADMIN — Medication 10 MILLIGRAM(S): at 02:54

## 2022-03-20 RX ADMIN — HYDROMORPHONE HYDROCHLORIDE 0.25 MILLIGRAM(S): 2 INJECTION INTRAMUSCULAR; INTRAVENOUS; SUBCUTANEOUS at 00:53

## 2022-03-20 RX ADMIN — LIDOCAINE 1 PATCH: 4 CREAM TOPICAL at 16:58

## 2022-03-20 RX ADMIN — LIDOCAINE 1 PATCH: 4 CREAM TOPICAL at 19:05

## 2022-03-20 RX ADMIN — SODIUM CHLORIDE 3 MILLILITER(S): 9 INJECTION INTRAMUSCULAR; INTRAVENOUS; SUBCUTANEOUS at 13:29

## 2022-03-20 NOTE — CONSULT NOTE ADULT - ASSESSMENT
65 y/o Male with no PMH presented to [place] complaining of chest heaviness/pain since last night, associated with diaphoresis and emesis. Patient took 324 ASA this morning. EKG revealed new LBBB. Patient was transferred to [facility], [facility] and cardiac cath performed for NSTEMI which demonstrated severe multivessel CAD. Patient was in cardiogenic shock, and IABP was placed and Dobutamine drip was started. TTE demonstrated EF of 20-25%, RV size normal but decreased function. No LV thrombus. s/p IABP. Patient was transferred and admitted to CTU for further management.    BNP 7087, [address] 3580   Cardiac studies:   LHC 3/19/2022: Multivessel CAD  Echo 3/19/2022: EF 20-25%, no LV thrombus, minimal MR, RV normal size with decreased function.     PAC 3/20/2022 AM: CO/CI 5.7/2.5; , PA 32/15/22; CVP 5; MAP 70;

## 2022-03-20 NOTE — PATIENT PROFILE ADULT - FALL HARM RISK - HARM RISK INTERVENTIONS

## 2022-03-20 NOTE — CONSULT NOTE ADULT - ASSESSMENT
66 year old male admitted for NSTEMI currently on heparin gtt, who was found ot have heamturia after darden catheter placement    -no need for CBI at this time  -trend H/H and transfuse accordingly  -TOV prior to discharge  -follow up with Dr Vital as an outpatient for full hematuria workup

## 2022-03-20 NOTE — CONSULT NOTE ADULT - SUBJECTIVE AND OBJECTIVE BOX
UROLOGY CONSULT NOTE    HPI:  This is a 66y old Male with no PMHx who was found to have hematuria.  He presented with chest pain to an OSH where a cardiac cath revealed multivessel disease.  He also developed cardiogenic shock requiring IABP and dobutamine and was transferred to [facility] for furhther care.  He states that he noticed the bleeding when they placed the catheter.  Denies history of  disease.  Denies prior hematuria, difficulty voiding, nocturia.  Urine color is currently light red without clots.  Of note, he is on a heparin gtt.      PAST MEDICAL HISTORY    No pertinent past medical history        PAST SURGICAL HISTORY    No significant past surgical history        HOME MEDICATIONS    Aspir 81 oral delayed release tablet: 1 tab(s) orally once a day (19 Mar 2022 10:38)      DRUG ALLERGIES    NKDA    REVIEW OF SYSTEMS: Pertinent positives and negatives as stated in HPI, otherwise negative      VITAL SIGNS    T(F): 99.7, Max: 99.7 (22 @ 08:00)  HR: 107  BP: --  RR: 24  SpO2: 98%    I's & O's      19 Mar 2022 07:01  -  20 Mar 2022 07:00  --------------------------------------------------------  IN: 1994.2 mL / OUT: 3035 mL / NET: -1040.8 mL    20 Mar 2022 07:01  -  20 Mar 2022 11:07  --------------------------------------------------------  IN: 238 mL / OUT: 625 mL / NET: -387 mL        PHYSICAL EXAM    Gen: Well groomed, well dressed, well nourished  Abd: Soft, NT/ND  : patient refused LATONIA  Ext: No edema present b/l      LABS:                        13.1   18.48 )-----------( 264               38.6     131  |  93  |  15  ----------------------------<  256  3.5   |  26  |  0.78    Ca    8.5  Phos  1.9  Mg     2.7    TPro  6.4  /  Alb  3.8  /  TBili  0.5  /  DBili  x   /  AST  234  /  ALT  59  /  AlkPhos  68    PT: 14.5 sec;   INR: 1.25 ratio  PTT:94.9 sec  CARDIAC MARKERS ( 20 Mar 2022 00:27 )  x     / x     / 2010 U/L / x     / 97.5 ng/mL  CARDIAC MARKERS ( 19 Mar 2022 17:08 )  x     / x     / 1593 U/L / x     / 96.1 ng/mL      Urinalysis Basic:    Color: Red / Appearance: Slightly Turbid / S.010 / pH: x  Gluc: x / Ketone: Negative  / Bili: Negative / Urobili: Negative   Blood: x / Protein: 30 mg/dL / Nitrite: Negative   Leuk Esterase: Negative / RBC: >50 /hpf / WBC 2 /HPF   Sq Epi: x / Non Sq Epi: 3 / Bacteria: Negative

## 2022-03-20 NOTE — CONSULT NOTE ADULT - ATTENDING COMMENTS
Probable traumatic darden in setting of anticoagulation. Urine draining well with punch color. Discussed outpatient eval which he is agreeable to.
67 y/o M with no known past medical history, presented to [facility] with decreased appetite and chest heaviness, found to have new LBBB. Transferred to [facility] and went to cath lab where he was found to have multivessel CAD and cardiogenic shock. IABP placed, inotropes started with improvement in hemodynamics. Overnight patient on Dobutamine and Milrinone, Dobutamine weaned off this morning.   Plan for CABG this week. Continue current management with IABP support and Milrinone. Check hemodynamics off Dobutamine, may need to resume if cardiac index worse. Monitor hemolysis labs closely.  Diuresis to keep CVP <10.  Continue ASA and statin. Will add HF medical therapy after CABG.  Urology consult for hematuria.

## 2022-03-20 NOTE — PROGRESS NOTE ADULT - SUBJECTIVE AND OBJECTIVE BOX
Patient seen and examined at the bedside.    Remained critically ill on continuous ICU monitoring.    OBJECTIVE:  Vital Signs Last 24 Hrs  T(C): 37.5 (20 Mar 2022 04:00), Max: 37.8 (19 Mar 2022 20:00)  T(F): 99.5 (20 Mar 2022 04:00), Max: 100 (19 Mar 2022 20:00)  HR: 100 (20 Mar 2022 07:00) (100 - 151)  BP: 79/62 (19 Mar 2022 15:05) (79/62 - 115/77)  BP(mean): 76 (19 Mar 2022 15:05) (76 - 81)  RR: 28 (20 Mar 2022 07:00) (14 - 40)  SpO2: 100% (20 Mar 2022 07:00) (81% - 100%)      Physical Exam:   General: in bed with multiple lines, gtt IABP support   Neurology: nonfocal   Eyes: bilateral pupils equal and reactive   ENT/Neck: Neck supple, trachea midline, No JVD   Respiratory: Rales noted bilaterally   CV: ST   Abdominal: Soft, NT, ND +BS,   Extremities: no edema, + peripheral pulses, R IABP   Skin: No Rashes, Hematoma, Ecchymosis                                        Assessment:  67 y/o Male     STEMI /  Multivessel CAD / complicated with cardiogenic shock   S/P IABP placement for severe LV Systolic failure  Lactic acidosis  Hyperglycemia     Plan:   ***Neuro***  [x] Nonfocal     Continue routine neuro assessment     ***Cardiovascular***  Invasive hemodynamic monitoring, assess perfusion indices   A fib/ -140  / CVP 5 / PAP 22 / CI 2.5 /Hct 39% / Lactate 1.4  [x] Primacor [x] Dobutamine [x] R IABP 1:1 Augmentation   Continuos reassessment of hemodynamics trend perfusion indices   TTE 3/19: 20-25%, severe global systolic dysfunction, RV size normal with decreased function, no LV thrombus   [x] AC therapy with heparin gtt  [x] ASA [x] Statin   Monitor RLE Perfusion & any bleeding complication  Serial EKG and cardiac enzymes     ***Pulmonary***  Supplemental O2   Continue to monitor SpO2 via pulse oximetry  Encourage bedside spirometry     ***GI***  [x] Diet: PO consistent carb diet.   [x] Protonix    ***Renal***  Acidosis   Diuresis  Lasix   Continue to monitor I/Os, BUN/Creatinine.   Replete lytes ROSE MARY Jovel presents:[x] Positive     ***ID***  No active antibiotic coverage      ***Endocrine***  [x] Hyperglycemia : HbA1c 11.4%               - [x] Insulin gtt               - Need tight glycemic control to prevent wound infection.          Patient requires continuous monitoring with bedside rhythm monitoring, pulse oximetry monitoring, and continuous central venous and arterial pressure monitoring; and intermittent blood gas analysis. Care plan discussed with the ICU care team.   Patient remained critical, at risk for life threatening decompensation.    I have spent 30 minutes providing critical care management to this patient.    By signing my name below, I, Kelli Beckman, attest that this documentation has been prepared under the direction and in the presence of Jeffrey Fang MD   Electronically signed: Evan Rasmussen, 03-20-22 @ 07:16    I, Jeffrey Fang, personally performed the services described in this documentation. all medical record entries made by the scribe were at my direction and in my presence. I have reviewed the chart and agree that the record reflects my personal performance and is accurate and complete  Electronically signed: Jeffrey Fang MD  Patient seen and examined at the bedside.    Remained critically ill on continuous ICU monitoring.    OBJECTIVE:  Vital Signs Last 24 Hrs  T(C): 37.5 (20 Mar 2022 04:00), Max: 37.8 (19 Mar 2022 20:00)  T(F): 99.5 (20 Mar 2022 04:00), Max: 100 (19 Mar 2022 20:00)  HR: 100 (20 Mar 2022 07:00) (100 - 151)  BP: 79/62 (19 Mar 2022 15:05) (79/62 - 115/77)  BP(mean): 76 (19 Mar 2022 15:05) (76 - 81)  RR: 28 (20 Mar 2022 07:00) (14 - 40)  SpO2: 100% (20 Mar 2022 07:00) (81% - 100%)      Physical Exam:   General: in bed with multiple lines, gtt IABP support   Neurology: nonfocal   Eyes: bilateral pupils equal and reactive   ENT/Neck: Neck supple, trachea midline, No JVD   Respiratory: Rales noted bilaterally   CV: ST   Abdominal: Soft, NT, ND +BS,   Extremities: no edema, + peripheral pulses, R IABP   Skin: No Rashes, Hematoma, Ecchymosis                                        Assessment:  67 y/o Male     STEMI /  Multivessel CAD / complicated with cardiogenic shock   S/P IABP placement for severe LV Systolic failure  Lactic acidosis  Hyperglycemia     Plan:   ***Neuro***  [x] Nonfocal     Continue routine neuro assessment     ***Cardiovascular***  Invasive hemodynamic monitoring, assess perfusion indices   A fib/ -140  / CVP 5 / PAP 22 / CI 2.5 /Hct 39% / Lactate 1.4  [x] Primacor [x] Dobutamine [x] R IABP 1:1 Augmentation   Continuos reassessment of hemodynamics trend perfusion indices   TTE 3/19: 20-25%, severe global systolic dysfunction, RV size normal with decreased function, no LV thrombus   [x] AC therapy with heparin gtt  [x] ASA [x] Statin   Monitor RLE Perfusion & any bleeding complication  Serial EKG and cardiac enzymes     ***Pulmonary***  Supplemental O2   Continue to monitor SpO2 via pulse oximetry  Encourage bedside spirometry     ***GI***  [x] Diet: PO consistent carb diet.   [x] Protonix    ***Renal***  Acidosis   Diuresis  Lasix   Continue to monitor I/Os, BUN/Creatinine.   Replete lytes ROSE MARY Jovel presents:[x] Positive     ***ID***  No active antibiotic coverage      ***Endocrine***  [x] Hyperglycemia : HbA1c 11.4%               - [x] Insulin gtt               - Need tight glycemic control to prevent wound infection.          Patient requires continuous monitoring with bedside rhythm monitoring, pulse oximetry monitoring, and continuous central venous and arterial pressure monitoring; and intermittent blood gas analysis. Care plan discussed with the ICU care team.   Patient remained critical, at risk for life threatening decompensation.    I have spent 75 minutes providing critical care management to this patient.    By signing my name below, I, Kelli Beckman, attest that this documentation has been prepared under the direction and in the presence of Jeffrey Fang MD   Electronically signed: Evan Rasmussen, 03-20-22 @ 07:16    I, Jeffrey Fang, personally performed the services described in this documentation. all medical record entries made by the scribe were at my direction and in my presence. I have reviewed the chart and agree that the record reflects my personal performance and is accurate and complete  Electronically signed: Jeffrey Fang MD

## 2022-03-20 NOTE — CONSULT NOTE ADULT - SUBJECTIVE AND OBJECTIVE BOX
HISTORY OF PRESENT ILLNESS: 65 y/o Male with no PMH presented to [facility] complaining of chest heaviness/pain since last night, associated with diaphoresis and emesis. Patient took 324 ASA this morning. EKG revealed new LBBB. Patient was transferred to [facility] and cardiac cath performed for NSTEMI which demonstrated severe multivessel CAD. Patient was in cardiogenic shock, and IABP was placed and Dobutamine drip was started. TTE demonstrated EF of 20-25%, RV size normal but decreased function. No LV thrombus. Patient was transferred and admitted to CTU for further management under Dr. Abad. (19 Mar 2022 15:35)      PAST MEDICAL & SURGICAL HISTORY:  No pertinent past medical history    No significant past surgical history        FAMILY HISTORY:  [ ] no pertinent family history of premature cardiovascular disease in first degree relatives.  Mother:   Father:   Siblings:     SOCIAL HISTORY:    [ ] Non-smoker  [ ] Smoker  [ ] Alcohol    Allergies    No Known Allergies    Intolerances    	    REVIEW OF SYSTEMS:  CONSTITUTIONAL: denies fever, weight loss, or fatigue  CARDIOLOGY: see hpi  RESPIRATORY: see hpi  NEUROLOGICAL: denies weakness, no focal deficits to report.  ENDOCRINOLOGICAL: no recent change in diabetic medications.   GI: no BRBPR, no N,V, diarrhea.    PSYCHIATRY: normal mood and affect  HEENT: no nasal discharge, no ecchymosis  SKIN: no ecchymosis, no breakdown  MUSCULOSKELETAL: Full range of motion x4.     PHYSICAL EXAM:  T(C): 37.5 (03-20-22 @ 04:00), Max: 37.8 (03-19-22 @ 20:00)  HR: 100 (03-20-22 @ 07:00) (100 - 151)  BP: 79/62 (03-19-22 @ 15:05) (79/62 - 115/77)  RR: 28 (03-20-22 @ 07:00) (14 - 40)  SpO2: 100% (03-20-22 @ 07:00) (81% - 100%)  Wt(kg): --  I&O's Summary    19 Mar 2022 07:01  -  20 Mar 2022 07:00  --------------------------------------------------------  IN: 1994.2 mL / OUT: 3035 mL / NET: -1040.8 mL        General Appearance: well appearing, normal for age and gender. 	  Neck: normal JVP, no bruit.   Eyes: No xanthomalasia, Extra Ocular muscles intact.   Cardiovascular: regular rate and rhythm S1 S2, No JVD, No murmurs, No edema  Respiratory: Lungs clear to auscultation	  Psychiatry: Alert and oriented x 3, Mood & affect appropriate  Gastrointestinal:  Soft, Non-tender  Skin/Integumen: No rashes, No ecchymoses, No cyanosis	  Neurologic: Non-focal  Musculoskeletal/extremities: Normal range of motion, No clubbing, cyanosis or edema  Vascular: Peripheral pulses palpable 2+ bilaterally    LABS:	 	                          13.1   18.48 )-----------( 264      ( 20 Mar 2022 00:27 )             38.6     03-20    131<L>  |  93<L>  |  15  ----------------------------<  256<H>  3.5   |  26  |  0.78    Ca    8.5      20 Mar 2022 00:27  Phos  1.9     03-20  Mg     2.7     03-20    TPro  6.4  /  Alb  3.8  /  TBili  0.5  /  DBili  x   /  AST  234<H>  /  ALT  59<H>  /  AlkPhos  68  03-20    CARDIAC MARKERS ( 20 Mar 2022 00:27 )  x     / x     / 2010 U/L / x     / 97.5 ng/mL  CARDIAC MARKERS ( 19 Mar 2022 17:08 )  x     / x     / 1593 U/L / x     / 96.1 ng/mL      PT/INR - ( 20 Mar 2022 02:12 )   PT: 14.5 sec;   INR: 1.25 ratio         PTT - ( 20 Mar 2022 02:12 )  PTT:61.3 sec      TELEMETRY EVENTS: 	    ECG:  	  RADIOLOGY: CXR: pulmonary edema  OTHER: 	    PREVIOUS DIAGNOSTIC TESTING:    [ ] Echocardiogram:  < from: Limited Transthoracic Echo (w/Cont) (03.19.22 @ 12:57) >  Observations:  Mitral Valve: Mitral annular calcification, otherwise  normal mitral valve. Minimal mitral regurgitation.  Aortic Valve/Aorta:  Normal aortic root, aortic arch and descending thoracic  aorta.  Left Atrium: Normal left atrium.  Left Ventricle: Endocardial visualization enhanced with  intravenous injection of Ultrasonic Enhancing Agent  (Definity).  Severe global left ventricular systolic  dysfunction, most preserved in the basal to mid  anterolateral walls.  No left ventricular thrombus.  diastolic function not evaluated  Right Heart: The right ventricle is not well visualized;  grossly normal right ventricular size with decreased right  ventricular systolic function. Normal tricuspid valve.  Pericardium/Pleura: Normal pericardium with no pericardial  effusion.  Hemodynamic: Estimated right atrial pressure is8 mm Hg.  ------------------------------------------------------------------------  Conclusions:  1. Mitral annular calcification, otherwise normal mitral  valve. Minimal mitral regurgitation.  2. Endocardial visualization enhanced with intravenous  injection of Ultrasonic Enhancing Agent (Definity).  Severe  global left ventricular systolic dysfunction, most  preserved in the basal to mid anterolateral walls.  No left  ventricular thrombus.  3. The right ventricle is not well visualized; grossly  normal right ventricular size with decreased right  ventricular systolic function.    < end of copied text >    [ ] Catheterization:  Multi-vessel CAD    	  Home Medications:  Aspir 81 oral delayed release tablet: 1 tab(s) orally once a day (19 Mar 2022 10:38)    MEDICATIONS  (STANDING):  aspirin enteric coated 81 milliGRAM(s) Oral daily  atorvastatin 80 milliGRAM(s) Oral at bedtime  chlorhexidine 2% Cloths 1 Application(s) Topical <User Schedule>  chlorhexidine 4% Liquid 1 Application(s) Topical <User Schedule>  dextrose 50% Injectable 50 milliLiter(s) IV Push every 15 minutes  dextrose 50% Injectable 25 milliLiter(s) IV Push every 15 minutes  DOBUTamine Infusion 0.5 MICROgram(s)/kG/Min (1.54 mL/Hr) IV Continuous <Continuous>  furosemide Infusion 10 mG/Hr (5 mL/Hr) IV Continuous <Continuous>  heparin  Infusion 1500 Unit(s)/Hr (17 mL/Hr) IV Continuous <Continuous>  insulin regular Infusion 6 Unit(s)/Hr (6 mL/Hr) IV Continuous <Continuous>  milrinone Infusion 0.4 MICROgram(s)/kG/Min (12.3 mL/Hr) IV Continuous <Continuous>  pantoprazole    Tablet 40 milliGRAM(s) Oral before breakfast  potassium chloride  10 mEq/50 mL IVPB 10 milliEquivalent(s) IV Intermittent every 1 hour  sodium chloride 0.9% lock flush 3 milliLiter(s) IV Push every 8 hours    MEDICATIONS  (PRN):

## 2022-03-21 DIAGNOSIS — R31.9 HEMATURIA, UNSPECIFIED: ICD-10-CM

## 2022-03-21 PROBLEM — Z78.9 OTHER SPECIFIED HEALTH STATUS: Chronic | Status: ACTIVE | Noted: 2022-03-19

## 2022-03-21 LAB
ALBUMIN SERPL ELPH-MCNC: 3.6 G/DL — SIGNIFICANT CHANGE UP (ref 3.3–5)
ALP SERPL-CCNC: 65 U/L — SIGNIFICANT CHANGE UP (ref 40–120)
ALT FLD-CCNC: 53 U/L — HIGH (ref 10–45)
ANION GAP SERPL CALC-SCNC: 12 MMOL/L — SIGNIFICANT CHANGE UP (ref 5–17)
APTT BLD: 60.9 SEC — HIGH (ref 27.5–35.5)
APTT BLD: 62.2 SEC — HIGH (ref 27.5–35.5)
APTT BLD: 84.4 SEC — HIGH (ref 27.5–35.5)
AST SERPL-CCNC: 135 U/L — HIGH (ref 10–40)
BASE EXCESS BLDMV CALC-SCNC: 1 MMOL/L — SIGNIFICANT CHANGE UP (ref -3–3)
BASE EXCESS BLDMV CALC-SCNC: 1.9 MMOL/L — SIGNIFICANT CHANGE UP (ref -3–3)
BASE EXCESS BLDMV CALC-SCNC: 2 MMOL/L — SIGNIFICANT CHANGE UP (ref -3–3)
BASE EXCESS BLDMV CALC-SCNC: 3.9 MMOL/L — HIGH (ref -3–3)
BASE EXCESS BLDMV CALC-SCNC: 5.8 MMOL/L — HIGH (ref -3–3)
BASE EXCESS BLDMV CALC-SCNC: 6.6 MMOL/L — HIGH (ref -3–3)
BASOPHILS # BLD AUTO: 0.06 K/UL — SIGNIFICANT CHANGE UP (ref 0–0.2)
BASOPHILS NFR BLD AUTO: 0.3 % — SIGNIFICANT CHANGE UP (ref 0–2)
BILIRUB SERPL-MCNC: 0.6 MG/DL — SIGNIFICANT CHANGE UP (ref 0.2–1.2)
BUN SERPL-MCNC: 14 MG/DL — SIGNIFICANT CHANGE UP (ref 7–23)
CALCIUM SERPL-MCNC: 8.2 MG/DL — LOW (ref 8.4–10.5)
CHLORIDE SERPL-SCNC: 89 MMOL/L — LOW (ref 96–108)
CK MB BLD-MCNC: 1.7 % — SIGNIFICANT CHANGE UP (ref 0–3.5)
CK MB CFR SERPL CALC: 14.9 NG/ML — HIGH (ref 0–6.7)
CK SERPL-CCNC: 870 U/L — HIGH (ref 30–200)
CO2 BLDMV-SCNC: 28 MMOL/L — SIGNIFICANT CHANGE UP (ref 21–29)
CO2 BLDMV-SCNC: 28 MMOL/L — SIGNIFICANT CHANGE UP (ref 21–29)
CO2 BLDMV-SCNC: 29 MMOL/L — SIGNIFICANT CHANGE UP (ref 21–29)
CO2 BLDMV-SCNC: 30 MMOL/L — HIGH (ref 21–29)
CO2 BLDMV-SCNC: 32 MMOL/L — HIGH (ref 21–29)
CO2 BLDMV-SCNC: 33 MMOL/L — HIGH (ref 21–29)
CO2 SERPL-SCNC: 24 MMOL/L — SIGNIFICANT CHANGE UP (ref 22–31)
CREAT SERPL-MCNC: 0.7 MG/DL — SIGNIFICANT CHANGE UP (ref 0.5–1.3)
EGFR: 102 ML/MIN/1.73M2 — SIGNIFICANT CHANGE UP
EOSINOPHIL # BLD AUTO: 0.16 K/UL — SIGNIFICANT CHANGE UP (ref 0–0.5)
EOSINOPHIL NFR BLD AUTO: 0.7 % — SIGNIFICANT CHANGE UP (ref 0–6)
GAS PNL BLDA: SIGNIFICANT CHANGE UP
GAS PNL BLDMV: SIGNIFICANT CHANGE UP
GLUCOSE BLDC GLUCOMTR-MCNC: 108 MG/DL — HIGH (ref 70–99)
GLUCOSE BLDC GLUCOMTR-MCNC: 129 MG/DL — HIGH (ref 70–99)
GLUCOSE BLDC GLUCOMTR-MCNC: 135 MG/DL — HIGH (ref 70–99)
GLUCOSE BLDC GLUCOMTR-MCNC: 141 MG/DL — HIGH (ref 70–99)
GLUCOSE BLDC GLUCOMTR-MCNC: 149 MG/DL — HIGH (ref 70–99)
GLUCOSE BLDC GLUCOMTR-MCNC: 152 MG/DL — HIGH (ref 70–99)
GLUCOSE BLDC GLUCOMTR-MCNC: 155 MG/DL — HIGH (ref 70–99)
GLUCOSE BLDC GLUCOMTR-MCNC: 155 MG/DL — HIGH (ref 70–99)
GLUCOSE BLDC GLUCOMTR-MCNC: 158 MG/DL — HIGH (ref 70–99)
GLUCOSE BLDC GLUCOMTR-MCNC: 162 MG/DL — HIGH (ref 70–99)
GLUCOSE BLDC GLUCOMTR-MCNC: 163 MG/DL — HIGH (ref 70–99)
GLUCOSE BLDC GLUCOMTR-MCNC: 167 MG/DL — HIGH (ref 70–99)
GLUCOSE BLDC GLUCOMTR-MCNC: 169 MG/DL — HIGH (ref 70–99)
GLUCOSE BLDC GLUCOMTR-MCNC: 172 MG/DL — HIGH (ref 70–99)
GLUCOSE BLDC GLUCOMTR-MCNC: 180 MG/DL — HIGH (ref 70–99)
GLUCOSE BLDC GLUCOMTR-MCNC: 189 MG/DL — HIGH (ref 70–99)
GLUCOSE BLDC GLUCOMTR-MCNC: 207 MG/DL — HIGH (ref 70–99)
GLUCOSE SERPL-MCNC: 167 MG/DL — HIGH (ref 70–99)
HAPTOGLOB SERPL-MCNC: 322 MG/DL — HIGH (ref 34–200)
HAPTOGLOB SERPL-MCNC: 329 MG/DL — HIGH (ref 34–200)
HCO3 BLDMV-SCNC: 27 MMOL/L — SIGNIFICANT CHANGE UP (ref 20–28)
HCO3 BLDMV-SCNC: 28 MMOL/L — SIGNIFICANT CHANGE UP (ref 20–28)
HCO3 BLDMV-SCNC: 31 MMOL/L — HIGH (ref 20–28)
HCO3 BLDMV-SCNC: 31 MMOL/L — HIGH (ref 20–28)
HCT VFR BLD CALC: 33.5 % — LOW (ref 39–50)
HGB BLD-MCNC: 11 G/DL — LOW (ref 13–17)
HOROWITZ INDEX BLDMV+IHG-RTO: 100 — SIGNIFICANT CHANGE UP
HOROWITZ INDEX BLDMV+IHG-RTO: 28 — SIGNIFICANT CHANGE UP
HOROWITZ INDEX BLDMV+IHG-RTO: 36 — SIGNIFICANT CHANGE UP
HOROWITZ INDEX BLDMV+IHG-RTO: 44 — SIGNIFICANT CHANGE UP
IMM GRANULOCYTES NFR BLD AUTO: 0.9 % — SIGNIFICANT CHANGE UP (ref 0–1.5)
INR BLD: 1.64 RATIO — HIGH (ref 0.88–1.16)
LDH SERPL L TO P-CCNC: 988 U/L — HIGH (ref 50–242)
LYMPHOCYTES # BLD AUTO: 14.2 % — SIGNIFICANT CHANGE UP (ref 13–44)
LYMPHOCYTES # BLD AUTO: 3.03 K/UL — SIGNIFICANT CHANGE UP (ref 1–3.3)
MAGNESIUM SERPL-MCNC: 1.8 MG/DL — SIGNIFICANT CHANGE UP (ref 1.6–2.6)
MCHC RBC-ENTMCNC: 29 PG — SIGNIFICANT CHANGE UP (ref 27–34)
MCHC RBC-ENTMCNC: 32.8 GM/DL — SIGNIFICANT CHANGE UP (ref 32–36)
MCV RBC AUTO: 88.4 FL — SIGNIFICANT CHANGE UP (ref 80–100)
MONOCYTES # BLD AUTO: 2.23 K/UL — HIGH (ref 0–0.9)
MONOCYTES NFR BLD AUTO: 10.4 % — SIGNIFICANT CHANGE UP (ref 2–14)
NEUTROPHILS # BLD AUTO: 15.72 K/UL — HIGH (ref 1.8–7.4)
NEUTROPHILS NFR BLD AUTO: 73.5 % — SIGNIFICANT CHANGE UP (ref 43–77)
NRBC # BLD: 0 /100 WBCS — SIGNIFICANT CHANGE UP (ref 0–0)
O2 CT VFR BLD CALC: 34 MMHG — SIGNIFICANT CHANGE UP (ref 30–65)
O2 CT VFR BLD CALC: 38 MMHG — SIGNIFICANT CHANGE UP (ref 30–65)
O2 CT VFR BLD CALC: 39 MMHG — SIGNIFICANT CHANGE UP (ref 30–65)
O2 CT VFR BLD CALC: 40 MMHG — SIGNIFICANT CHANGE UP (ref 30–65)
O2 CT VFR BLD CALC: 42 MMHG — SIGNIFICANT CHANGE UP (ref 30–65)
O2 CT VFR BLD CALC: 43 MMHG — SIGNIFICANT CHANGE UP (ref 30–65)
PA ADP PRP-ACNC: 145 PRU — LOW (ref 194–417)
PCO2 BLDMV: 41 MMHG — SIGNIFICANT CHANGE UP (ref 30–65)
PCO2 BLDMV: 42 MMHG — SIGNIFICANT CHANGE UP (ref 30–65)
PCO2 BLDMV: 44 MMHG — SIGNIFICANT CHANGE UP (ref 30–65)
PCO2 BLDMV: 45 MMHG — SIGNIFICANT CHANGE UP (ref 30–65)
PH BLDMV: 7.38 — SIGNIFICANT CHANGE UP (ref 7.32–7.45)
PH BLDMV: 7.4 — SIGNIFICANT CHANGE UP (ref 7.32–7.45)
PH BLDMV: 7.42 — SIGNIFICANT CHANGE UP (ref 7.32–7.45)
PH BLDMV: 7.44 — SIGNIFICANT CHANGE UP (ref 7.32–7.45)
PH BLDMV: 7.45 — SIGNIFICANT CHANGE UP (ref 7.32–7.45)
PH BLDMV: 7.46 — HIGH (ref 7.32–7.45)
PHOSPHATE SERPL-MCNC: 2.5 MG/DL — SIGNIFICANT CHANGE UP (ref 2.5–4.5)
PLATELET # BLD AUTO: 184 K/UL — SIGNIFICANT CHANGE UP (ref 150–400)
POTASSIUM SERPL-MCNC: 3.6 MMOL/L — SIGNIFICANT CHANGE UP (ref 3.5–5.3)
POTASSIUM SERPL-SCNC: 3.6 MMOL/L — SIGNIFICANT CHANGE UP (ref 3.5–5.3)
PROT SERPL-MCNC: 6.2 G/DL — SIGNIFICANT CHANGE UP (ref 6–8.3)
PROTHROM AB SERPL-ACNC: 19 SEC — HIGH (ref 10.5–13.4)
RBC # BLD: 3.79 M/UL — LOW (ref 4.2–5.8)
RBC # FLD: 12.5 % — SIGNIFICANT CHANGE UP (ref 10.3–14.5)
SAO2 % BLDMV: 60.8 — SIGNIFICANT CHANGE UP (ref 60–90)
SAO2 % BLDMV: 68.9 — SIGNIFICANT CHANGE UP (ref 60–90)
SAO2 % BLDMV: 73.1 — SIGNIFICANT CHANGE UP (ref 60–90)
SAO2 % BLDMV: 73.5 — SIGNIFICANT CHANGE UP (ref 60–90)
SAO2 % BLDMV: 76 — SIGNIFICANT CHANGE UP (ref 60–90)
SAO2 % BLDMV: 76.7 — SIGNIFICANT CHANGE UP (ref 60–90)
SODIUM SERPL-SCNC: 125 MMOL/L — LOW (ref 135–145)
TROPONIN T, HIGH SENSITIVITY RESULT: 3186 NG/L — HIGH (ref 0–51)
WBC # BLD: 21.4 K/UL — HIGH (ref 3.8–10.5)
WBC # FLD AUTO: 21.4 K/UL — HIGH (ref 3.8–10.5)

## 2022-03-21 PROCEDURE — 99291 CRITICAL CARE FIRST HOUR: CPT

## 2022-03-21 PROCEDURE — 93306 TTE W/DOPPLER COMPLETE: CPT | Mod: 26

## 2022-03-21 PROCEDURE — 99292 CRITICAL CARE ADDL 30 MIN: CPT

## 2022-03-21 PROCEDURE — 71045 X-RAY EXAM CHEST 1 VIEW: CPT | Mod: 26

## 2022-03-21 PROCEDURE — 99233 SBSQ HOSP IP/OBS HIGH 50: CPT | Mod: GC

## 2022-03-21 PROCEDURE — 93010 ELECTROCARDIOGRAM REPORT: CPT | Mod: 76

## 2022-03-21 RX ORDER — DOBUTAMINE HCL 250MG/20ML
1 VIAL (ML) INTRAVENOUS
Qty: 500 | Refills: 0 | Status: DISCONTINUED | OUTPATIENT
Start: 2022-03-21 | End: 2022-03-23

## 2022-03-21 RX ORDER — POTASSIUM CHLORIDE 20 MEQ
40 PACKET (EA) ORAL ONCE
Refills: 0 | Status: COMPLETED | OUTPATIENT
Start: 2022-03-21 | End: 2022-03-21

## 2022-03-21 RX ORDER — ALBUMIN HUMAN 25 %
50 VIAL (ML) INTRAVENOUS
Refills: 0 | Status: COMPLETED | OUTPATIENT
Start: 2022-03-21 | End: 2022-03-21

## 2022-03-21 RX ORDER — POTASSIUM CHLORIDE 20 MEQ
10 PACKET (EA) ORAL
Refills: 0 | Status: COMPLETED | OUTPATIENT
Start: 2022-03-21 | End: 2022-03-21

## 2022-03-21 RX ORDER — POTASSIUM CHLORIDE 20 MEQ
10 PACKET (EA) ORAL ONCE
Refills: 0 | Status: COMPLETED | OUTPATIENT
Start: 2022-03-21 | End: 2022-03-21

## 2022-03-21 RX ORDER — ONDANSETRON 8 MG/1
4 TABLET, FILM COATED ORAL ONCE
Refills: 0 | Status: COMPLETED | OUTPATIENT
Start: 2022-03-21 | End: 2022-03-21

## 2022-03-21 RX ORDER — ATROPA BELLADONNA AND OPIUM 16.2; 6 MG/1; MG/1
1 SUPPOSITORY RECTAL EVERY 8 HOURS
Refills: 0 | Status: DISCONTINUED | OUTPATIENT
Start: 2022-03-21 | End: 2022-03-22

## 2022-03-21 RX ORDER — ACETAMINOPHEN 500 MG
650 TABLET ORAL EVERY 6 HOURS
Refills: 0 | Status: DISCONTINUED | OUTPATIENT
Start: 2022-03-21 | End: 2022-03-23

## 2022-03-21 RX ORDER — ACETAMINOPHEN 500 MG
1000 TABLET ORAL ONCE
Refills: 0 | Status: COMPLETED | OUTPATIENT
Start: 2022-03-21 | End: 2022-03-21

## 2022-03-21 RX ORDER — CALCIUM GLUCONATE 100 MG/ML
1 VIAL (ML) INTRAVENOUS ONCE
Refills: 0 | Status: COMPLETED | OUTPATIENT
Start: 2022-03-21 | End: 2022-03-21

## 2022-03-21 RX ADMIN — Medication 40 MILLIEQUIVALENT(S): at 17:55

## 2022-03-21 RX ADMIN — Medication 4.61 MICROGRAM(S)/KG/MIN: at 20:22

## 2022-03-21 RX ADMIN — ATORVASTATIN CALCIUM 80 MILLIGRAM(S): 80 TABLET, FILM COATED ORAL at 21:08

## 2022-03-21 RX ADMIN — INSULIN HUMAN 6 UNIT(S)/HR: 100 INJECTION, SOLUTION SUBCUTANEOUS at 20:22

## 2022-03-21 RX ADMIN — Medication 50 MILLIEQUIVALENT(S): at 07:54

## 2022-03-21 RX ADMIN — CHLORHEXIDINE GLUCONATE 1 APPLICATION(S): 213 SOLUTION TOPICAL at 05:55

## 2022-03-21 RX ADMIN — Medication 50 MILLIEQUIVALENT(S): at 00:30

## 2022-03-21 RX ADMIN — Medication 40 MILLIEQUIVALENT(S): at 02:33

## 2022-03-21 RX ADMIN — Medication 50 MILLIEQUIVALENT(S): at 14:45

## 2022-03-21 RX ADMIN — Medication 50 MILLIEQUIVALENT(S): at 07:13

## 2022-03-21 RX ADMIN — Medication 50 MILLIEQUIVALENT(S): at 11:19

## 2022-03-21 RX ADMIN — Medication 650 MILLIGRAM(S): at 20:30

## 2022-03-21 RX ADMIN — ATROPA BELLADONNA AND OPIUM 1 SUPPOSITORY(S): 16.2; 6 SUPPOSITORY RECTAL at 20:00

## 2022-03-21 RX ADMIN — Medication 50 MILLIEQUIVALENT(S): at 07:07

## 2022-03-21 RX ADMIN — Medication 50 MILLILITER(S): at 00:55

## 2022-03-21 RX ADMIN — Medication 81 MILLIGRAM(S): at 12:38

## 2022-03-21 RX ADMIN — ATROPA BELLADONNA AND OPIUM 1 SUPPOSITORY(S): 16.2; 6 SUPPOSITORY RECTAL at 20:30

## 2022-03-21 RX ADMIN — HEPARIN SODIUM 18 UNIT(S)/HR: 5000 INJECTION INTRAVENOUS; SUBCUTANEOUS at 20:22

## 2022-03-21 RX ADMIN — SODIUM CHLORIDE 3 MILLILITER(S): 9 INJECTION INTRAMUSCULAR; INTRAVENOUS; SUBCUTANEOUS at 15:14

## 2022-03-21 RX ADMIN — PANTOPRAZOLE SODIUM 40 MILLIGRAM(S): 20 TABLET, DELAYED RELEASE ORAL at 09:54

## 2022-03-21 RX ADMIN — Medication 50 MILLILITER(S): at 00:30

## 2022-03-21 RX ADMIN — ATROPA BELLADONNA AND OPIUM 1 SUPPOSITORY(S): 16.2; 6 SUPPOSITORY RECTAL at 11:20

## 2022-03-21 RX ADMIN — MILRINONE LACTATE 9.23 MICROGRAM(S)/KG/MIN: 1 INJECTION, SOLUTION INTRAVENOUS at 20:23

## 2022-03-21 RX ADMIN — SENNA PLUS 2 TABLET(S): 8.6 TABLET ORAL at 21:08

## 2022-03-21 RX ADMIN — LIDOCAINE 1 PATCH: 4 CREAM TOPICAL at 04:57

## 2022-03-21 RX ADMIN — Medication 400 MILLIGRAM(S): at 02:33

## 2022-03-21 RX ADMIN — VASOPRESSIN 2 UNIT(S)/MIN: 20 INJECTION INTRAVENOUS at 20:22

## 2022-03-21 RX ADMIN — Medication 650 MILLIGRAM(S): at 20:00

## 2022-03-21 RX ADMIN — ONDANSETRON 4 MILLIGRAM(S): 8 TABLET, FILM COATED ORAL at 02:33

## 2022-03-21 RX ADMIN — Medication 650 MILLIGRAM(S): at 11:13

## 2022-03-21 RX ADMIN — Medication 650 MILLIGRAM(S): at 10:47

## 2022-03-21 RX ADMIN — Medication 1000 MILLIGRAM(S): at 02:45

## 2022-03-21 RX ADMIN — Medication 100 GRAM(S): at 11:19

## 2022-03-21 RX ADMIN — Medication 40 MILLIEQUIVALENT(S): at 11:19

## 2022-03-21 RX ADMIN — SODIUM CHLORIDE 3 MILLILITER(S): 9 INJECTION INTRAMUSCULAR; INTRAVENOUS; SUBCUTANEOUS at 05:55

## 2022-03-21 RX ADMIN — Medication 5 MG/HR: at 20:22

## 2022-03-21 RX ADMIN — SODIUM CHLORIDE 3 MILLILITER(S): 9 INJECTION INTRAMUSCULAR; INTRAVENOUS; SUBCUTANEOUS at 21:17

## 2022-03-21 NOTE — PROGRESS NOTE ADULT - SUBJECTIVE AND OBJECTIVE BOX
Interval Events:    No acute events overnight  Urine clear    O: Vital Signs Last 24 Hrs  T(C): 37.1 (21 Mar 2022 08:00), Max: 38.2 (20 Mar 2022 16:00)  T(F): 98.8 (21 Mar 2022 08:00), Max: 100.8 (20 Mar 2022 16:00)  HR: 116 (21 Mar 2022 10:30) (90 - 120)  BP: --  BP(mean): --  RR: 26 (21 Mar 2022 10:30) (18 - 52)  SpO2: 99% (21 Mar 2022 10:30) (89% - 100%)      20 Mar 2022 07:01  -  21 Mar 2022 07:00  --------------------------------------------------------  IN:    DOBUTamine: 1.5 mL    DOBUTamine: 13.8 mL    Furosemide: 25 mL    Furosemide: 30 mL    Furosemide: 2.5 mL    Furosemide: 17.5 mL    Furosemide: 20 mL    Heparin: 400 mL    Insulin: 161 mL    IV PiggyBack: 450 mL    Milrinone: 86.8 mL    Milrinone: 232.5 mL    Modular Fluid: 50 mL    Vasopressin: 40 mL  Total IN: 1530.6 mL    OUT:    Voided (mL): 3545 mL  Total OUT: 3545 mL    Total NET: -2014.4 mL      21 Mar 2022 07:01  -  21 Mar 2022 11:09  --------------------------------------------------------  IN:    DOBUTamine: 16 mL    Furosemide: 40 mL    Heparin: 66 mL    Insulin: 30 mL    IV PiggyBack: 300 mL    Milrinone: 49.6 mL  Total IN: 501.6 mL    OUT:    Vasopressin: 0 mL    Voided (mL): 1250 mL  Total OUT: 1250 mL    Total NET: -748.4 mL          Physical Exam:    Gen: Well-developed, well-nourished in no acute distress  Resp: No additional work of breathing   GI: Soft, non-tender, non-distended, with normoactive bowel sounds.  No masses.  : darden in place draining clear urine  MSK: Moves all extremities equally  Skin: No rashes    Labs:                        11.0   21.40 )-----------( 184      ( 21 Mar 2022 01:05 )             33.5     21 Mar 2022 01:05    125    |  89     |  14     ----------------------------<  167    3.6     |  24     |  0.70     Ca    8.2        21 Mar 2022 01:05  Phos  2.5       21 Mar 2022 01:05  Mg     1.8       21 Mar 2022 01:05    TPro  6.2    /  Alb  3.6    /  TBili  0.6    /  DBili  x      /  AST  135    /  ALT  53     /  AlkPhos  65     21 Mar 2022 01:05    PT/INR - ( 21 Mar 2022 02:05 )   PT: 19.0 sec;   INR: 1.64 ratio         PTT - ( 21 Mar 2022 02:05 )  PTT:84.4 sec  CAPILLARY BLOOD GLUCOSE  168 (21 Mar 2022 07:00)  135 (21 Mar 2022 06:00)  141 (21 Mar 2022 02:00)  155 (21 Mar 2022 01:00)  169 (21 Mar 2022 00:00)  190 (20 Mar 2022 23:00)  159 (20 Mar 2022 22:00)  184 (20 Mar 2022 21:00)  188 (20 Mar 2022 20:00)      POCT Blood Glucose.: 167 mg/dL (21 Mar 2022 08:15)  POCT Blood Glucose.: 163 mg/dL (21 Mar 2022 06:53)  POCT Blood Glucose.: 135 mg/dL (21 Mar 2022 06:16)  POCT Blood Glucose.: 155 mg/dL (21 Mar 2022 03:49)  POCT Blood Glucose.: 158 mg/dL (21 Mar 2022 03:10)  POCT Blood Glucose.: 141 mg/dL (21 Mar 2022 01:51)  POCT Blood Glucose.: 155 mg/dL (21 Mar 2022 00:57)  POCT Blood Glucose.: 169 mg/dL (20 Mar 2022 23:59)  POCT Blood Glucose.: 190 mg/dL (20 Mar 2022 23:17)  POCT Blood Glucose.: 159 mg/dL (20 Mar 2022 21:56)  POCT Blood Glucose.: 184 mg/dL (20 Mar 2022 20:55)  POCT Blood Glucose.: 188 mg/dL (20 Mar 2022 20:03)  POCT Blood Glucose.: 199 mg/dL (20 Mar 2022 19:23)  POCT Blood Glucose.: 100 mg/dL (20 Mar 2022 17:22)  POCT Blood Glucose.: 116 mg/dL (20 Mar 2022 15:47)  POCT Blood Glucose.: 142 mg/dL (20 Mar 2022 14:33)  POCT Blood Glucose.: 134 mg/dL (20 Mar 2022 13:49)  POCT Blood Glucose.: 220 mg/dL (20 Mar 2022 11:50)    CARDIAC MARKERS ( 21 Mar 2022 01:05 )  x     / x     / 870 U/L / x     / 14.9 ng/mL  CARDIAC MARKERS ( 20 Mar 2022 00:27 )  x     / x     / 2010 U/L / x     / 97.5 ng/mL  CARDIAC MARKERS ( 19 Mar 2022 17:08 )  x     / x     / 1593 U/L / x     / 96.1 ng/mL      LIVER FUNCTIONS - ( 21 Mar 2022 01:05 )  Alb: 3.6 g/dL / Pro: 6.2 g/dL / ALK PHOS: 65 U/L / ALT: 53 U/L / AST: 135 U/L / GGT: x             Urinalysis Basic - ( 20 Mar 2022 01:08 )    Color: Red / Appearance: Slightly Turbid / S.010 / pH: x  Gluc: x / Ketone: Negative  / Bili: Negative / Urobili: Negative   Blood: x / Protein: 30 mg/dL / Nitrite: Negative   Leuk Esterase: Negative / RBC: >50 /hpf / WBC 2 /HPF   Sq Epi: x / Non Sq Epi: 3 / Bacteria: Negative        MEDICATIONS  (STANDING):  aspirin enteric coated 81 milliGRAM(s) Oral daily  atorvastatin 80 milliGRAM(s) Oral at bedtime  calcium gluconate IVPB 1 Gram(s) IV Intermittent once  chlorhexidine 2% Cloths 1 Application(s) Topical <User Schedule>  chlorhexidine 4% Liquid 1 Application(s) Topical <User Schedule>  dextrose 50% Injectable 50 milliLiter(s) IV Push every 15 minutes  dextrose 50% Injectable 25 milliLiter(s) IV Push every 15 minutes  DOBUTamine Infusion 1.5 MICROgram(s)/kG/Min (4.61 mL/Hr) IV Continuous <Continuous>  furosemide Infusion 10 mG/Hr (5 mL/Hr) IV Continuous <Continuous>  heparin  Infusion 1500 Unit(s)/Hr (16.5 mL/Hr) IV Continuous <Continuous>  insulin regular Infusion 6 Unit(s)/Hr (6 mL/Hr) IV Continuous <Continuous>  lidocaine   4% Patch 1 Patch Transdermal every 24 hours  milrinone Infusion 0.3 MICROgram(s)/kG/Min (9.23 mL/Hr) IV Continuous <Continuous>  pantoprazole    Tablet 40 milliGRAM(s) Oral before breakfast  potassium chloride   Solution 40 milliEquivalent(s) Oral once  potassium chloride  10 mEq/50 mL IVPB 10 milliEquivalent(s) IV Intermittent every 1 hour  senna 2 Tablet(s) Oral at bedtime  sodium chloride 0.9% lock flush 3 milliLiter(s) IV Push every 8 hours  vasopressin Infusion 0.033 Unit(s)/Min (2 mL/Hr) IV Continuous <Continuous>    MEDICATIONS  (PRN):  acetaminophen     Tablet .. 650 milliGRAM(s) Oral every 6 hours PRN Temp greater or equal to 38.5C (101.3F), Moderate Pain (4 - 6)  belladonna 16.2 mG/opium 30 mg Suppository 1 Suppository(s) Rectal every 8 hours PRN bladder spasm

## 2022-03-21 NOTE — PROGRESS NOTE ADULT - SUBJECTIVE AND OBJECTIVE BOX
NATALIA TENA  MRN-[id number]  Patient is a 66y old  Male who presents with a chief complaint of Chest pressure/pain (21 Mar 2022 13:16)    HPI:  65 y/o Male with no PMH presented to [facility] complaining of chest heaviness/pain since last night, associated with diaphoresis and emesis. Patient took 324 ASA this morning. EKG revealed new LBBB. Patient was transferred to [facility], Brilinta loaded and cardiac cath performed for NSTEMI which demonstrated severe multivessel CAD. Patient was in cardiogenic shock, and IABP was placed and Dobutamine drip was started. TTE demonstrated EF of 20-25%, RV size normal but decreased function. No LV thrombus. Patient was transferred and admitted to CTU for further management under Dr. Abad. (19 Mar 2022 15:35)      Surgery/Hospital course:  3/19 Chest pain/ pressure from last night,  this AM, Brilinta loaded, NSTEMI w/ new LBB, Cath: MVD, cardiogenic shock, IABP placed on . TTE: EF 20-25%, severe global systolic dysfnx, RV size normal with decreased fxn, no LV thrombus      Today/Overnight:  - WBC rising continue to monitor   - continue pressor support as tolerated     Vital Signs Last 24 Hrs  T(C): 37.8 (21 Mar 2022 17:00), Max: 38.2 (20 Mar 2022 20:00)  T(F): 100 (21 Mar 2022 17:00), Max: 100.8 (20 Mar 2022 20:00)  HR: 114 (21 Mar 2022 19:15) (90 - 120)  BP: --  BP(mean): --  RR: 28 (21 Mar 2022 19:15) (17 - 52)  SpO2: 98% (21 Mar 2022 19:15) (89% - 100%)    Physical Exam:  Gen: No acute distress, well- developed   Head: Atraumatic, Normocephalic   Eyes: EOMI, PERRLA, conjunctiva and sclera clear   Neck: Supple, no lymphadenopathy, no JVD   Pulmonary: CTAB, No wheezes, rales or rhonchi  CVS: Regular rate and rhythm. Normal S1/S2. no murmurs, rubs, or gallops, adventitious lung sounds       Abdomen: Soft, non- tender, non- distended, normal bowel sounds on nursing assessment no organomegaly  Extremities: Intact, 2+ peripheral pulses b/l, no clubbing, cyanosis, or edema   Neurology: A&O x3, non focal deficits   Skin: No rashes or lesions, warm, capillary refill < 3 seconds, no cyanosis     ============================I/O===========================  I&O's Detail    20 Mar 2022 07:  -  21 Mar 2022 07:00  --------------------------------------------------------  IN:    DOBUTamine: 1.5 mL    DOBUTamine: 13.8 mL    Furosemide: 25 mL    Furosemide: 30 mL    Furosemide: 2.5 mL    Furosemide: 17.5 mL    Furosemide: 20 mL    Heparin: 400 mL    Insulin: 161 mL    IV PiggyBack: 450 mL    Milrinone: 86.8 mL    Milrinone: 232.5 mL    Modular Fluid: 50 mL    Vasopressin: 40 mL  Total IN: 1530.6 mL    OUT:    Voided (mL): 3545 mL  Total OUT: 3545 mL    Total NET: -2014.4 mL      21 Mar 2022 07:  -  21 Mar 2022 19:47  --------------------------------------------------------  IN:    DOBUTamine: 50.2 mL    Furosemide: 40 mL    Furosemide: 45 mL    Heparin: 221.5 mL    Insulin: 81 mL    IV PiggyBack: 450 mL    Milrinone: 161.2 mL  Total IN: 1048.9 mL    OUT:    Vasopressin: 0 mL    Voided (mL): 2875 mL  Total OUT: 2875 mL    Total NET: -1826.1 mL        ============================ LABS =========================                        11.0   21.40 )-----------( 184      ( 21 Mar 2022 01:05 )             33.5     03-21    125<L>  |  89<L>  |  14  ----------------------------<  167<H>  3.6   |  24  |  0.70    Ca    8.2<L>      21 Mar 2022 01:05  Phos  2.5     -  Mg     1.8     -    TPro  6.2  /  Alb  3.6  /  TBili  0.6  /  DBili  x   /  AST  135<H>  /  ALT  53<H>  /  AlkPhos  65  -21    LIVER FUNCTIONS - ( 21 Mar 2022 01:05 )  Alb: 3.6 g/dL / Pro: 6.2 g/dL / ALK PHOS: 65 U/L / ALT: 53 U/L / AST: 135 U/L / GGT: x           PT/INR - ( 21 Mar 2022 02:05 )   PT: 19.0 sec;   INR: 1.64 ratio         PTT - ( 21 Mar 2022 17:57 )  PTT:62.2 sec  ABG - ( 21 Mar 2022 18:45 )  pH, Arterial: 7.48  pH, Blood: x     /  pCO2: 39    /  pO2: 110   / HCO3: 29    / Base Excess: 5.1   /  SaO2: 98.4              Urinalysis Basic - ( 20 Mar 2022 01:08 )    Color: Red / Appearance: Slightly Turbid / S.010 / pH: x  Gluc: x / Ketone: Negative  / Bili: Negative / Urobili: Negative   Blood: x / Protein: 30 mg/dL / Nitrite: Negative   Leuk Esterase: Negative / RBC: >50 /hpf / WBC 2 /HPF   Sq Epi: x / Non Sq Epi: 3 / Bacteria: Negative      ======================Micro/Rad/Cardio=================  Culture: Reviewed   CXR: Reviewed  Echo: Reviewed  ======================================================  PAST MEDICAL & SURGICAL HISTORY:  No pertinent past medical history    No significant past surgical history      ========================ASSESSMENT ================  NSTEMI   Multivessel CAD   Complicated with cardiogenic shock   S/P IABP placement for severe LV Systolic failure  Lactic acidosis  Hyperglycemia     Plan:  ====================== NEUROLOGY=====================  Continue close monitoring of neuro status   Tylenol PRN for fevers     acetaminophen     Tablet .. 650 milliGRAM(s) Oral every 6 hours PRN Temp greater or equal to 38.5C (101.3F), Moderate Pain (4 - 6)    ==================== RESPIRATORY======================  Supplemental O2 via 6L NC  Encourage incentive spirometry, continue pulse ox monitoring, follow ABGs     ====================CARDIOVASCULAR==================  NSTEMI, multivessle CAD S/P IABP placement for severe LV Systolic failure  Continue invasive hemodynamic monitoring   Lactate 1.2, continue trending   IABP 1:1 with good augmentation   Inotropic support with IV dobutamine and IV primacor   Continue pressor support with IV vasopressin   TTE: EF 20-25%, severe global systolic dysfnx, RV size normal with decreased fxn, no LV thrombus  ASA/ Lipitor for CAD    DOBUTamine Infusion 1.5 MICROgram(s)/kG/Min (4.61 mL/Hr) IV Continuous <Continuous>  aspirin enteric coated 81 milliGRAM(s) Oral daily  milrinone Infusion 0.3 MICROgram(s)/kG/Min (9.23 mL/Hr) IV Continuous <Continuous>  vasopressin Infusion 0.033 Unit(s)/Min (2 mL/Hr) IV Continuous <Continuous>  atorvastatin 80 milliGRAM(s) Oral at bedtime    ===================HEMATOLOGIC/ONC ===================  Monitor H&H/Plts     VTE prophylaxis, heparin  Infusion 1500 Unit(s)/Hr (18 mL/Hr) IV Continuous <Continuous>    ===================== RENAL =========================  Acidosis  Continue monitoring I&Os, BUN/Cr, and urine output   Replete lytes PRN. Keep K> 4 and Mg >2   Continue to diurese with IV Lasix     furosemide Infusion 10 mG/Hr (5 mL/Hr) IV Continuous <Continuous>  ==================== GASTROINTESTINAL===================  Bowel regimen with Senna     GI prophylaxis, pantoprazole    Tablet 40 milliGRAM(s) Oral before breakfast  senna 2 Tablet(s) Oral at bedtime  sodium chloride 0.9% lock flush 3 milliLiter(s) IV Push every 8 hours    =======================    ENDOCRINE  =====================  Stress hyperglycemia, continue glucose control with insulin gtt     dextrose 50% Injectable 50 milliLiter(s) IV Push every 15 minutes  dextrose 50% Injectable 25 milliLiter(s) IV Push every 15 minutes  insulin regular Infusion 6 Unit(s)/Hr (6 mL/Hr) IV Continuous <Continuous>    ========================INFECTIOUS DISEASE================  Temp 100F, WBC rising 18.48 -> 21.40   Continue trending WBC and monitoring fever curve     Patient requires continuous monitoring with bedside rhythm monitoring, pulse oximetry monitoring, and continuous central venous and arterial pressure monitoring; and intermittent blood gas analysis.  Care plan discussed with ICU care team.    Patient remained critical, at risk for life threatening decompensation.   I have spent 35 minutes providing acute care with multiple re-evaluations throughout the evening.     By signing my name below, I, Kelli Beckman, attest that this documentation has been prepared under the direction and in the presence of Tatianna Altman NP.  Electronically signed: Evan Rasmussen    I, Tatianna Altman, personally performed the services described in this documentation. All medical record entries made by the scribe were at my direction and in my presence. I have reviewed the chart and agree that the record reflects my personal performance and is accurate and complete  Electronically signed: Tatianna Altman NP, 22 @ 19:47       NATALIA TENA  MRN-[id number]  Patient is a 66y old  Male who presents with a chief complaint of Chest pressure/pain (21 Mar 2022 13:16)    HPI:  67 y/o Male with no PMH presented to [place] complaining of chest heaviness/pain since last night, associated with diaphoresis and emesis. Patient took 324 ASA this morning. EKG revealed new LBBB. Patient was transferred to [facility], Brilinta loaded and cardiac cath performed for NSTEMI which demonstrated severe multivessel CAD. Patient was in cardiogenic shock, and IABP was placed and Dobutamine drip was started. TTE demonstrated EF of 20-25%, RV size normal but decreased function. No LV thrombus. Patient was transferred and admitted to CTU for further management under Dr. Abad. (19 Mar 2022 15:35)      Surgery/Hospital course:  3/19 Chest pain/ pressure from last night,  this AM, Brilinta loaded, NSTEMI w/ new LBB, Cath: MVD, cardiogenic shock, IABP placed on . TTE: EF 20-25%, severe global systolic dysfnx, RV size normal with decreased fxn, no LV thrombus      Today/Overnight:  - WBC rising continue to monitor   - continue pressor support as tolerated     Vital Signs Last 24 Hrs  T(C): 37.8 (21 Mar 2022 17:00), Max: 38.2 (20 Mar 2022 20:00)  T(F): 100 (21 Mar 2022 17:00), Max: 100.8 (20 Mar 2022 20:00)  HR: 114 (21 Mar 2022 19:15) (90 - 120)  BP: --  BP(mean): --  RR: 28 (21 Mar 2022 19:15) (17 - 52)  SpO2: 98% (21 Mar 2022 19:15) (89% - 100%)    Physical Exam:  Gen: No acute distress, well- developed   Head: Atraumatic, Normocephalic   Eyes: EOMI, PERRLA, conjunctiva and sclera clear   Neck: Supple, no lymphadenopathy, no JVD   Pulmonary: CTAB, No wheezes, rales or rhonchi  CVS: Regular rate and rhythm. Normal S1/S2. no murmurs, rubs, or gallops, adventitious lung sounds       Abdomen: Soft, non- tender, non- distended, normal bowel sounds on nursing assessment no organomegaly  Extremities: Intact, 2+ peripheral pulses b/l, no clubbing, cyanosis, or edema   Neurology: A&O x3, non focal deficits   Skin: No rashes or lesions, warm, capillary refill < 3 seconds, no cyanosis     ============================I/O===========================  I&O's Detail    20 Mar 2022 07:  -  21 Mar 2022 07:00  --------------------------------------------------------  IN:    DOBUTamine: 1.5 mL    DOBUTamine: 13.8 mL    Furosemide: 25 mL    Furosemide: 30 mL    Furosemide: 2.5 mL    Furosemide: 17.5 mL    Furosemide: 20 mL    Heparin: 400 mL    Insulin: 161 mL    IV PiggyBack: 450 mL    Milrinone: 86.8 mL    Milrinone: 232.5 mL    Modular Fluid: 50 mL    Vasopressin: 40 mL  Total IN: 1530.6 mL    OUT:    Voided (mL): 3545 mL  Total OUT: 3545 mL    Total NET: -2014.4 mL      21 Mar 2022 07:  -  21 Mar 2022 19:47  --------------------------------------------------------  IN:    DOBUTamine: 50.2 mL    Furosemide: 40 mL    Furosemide: 45 mL    Heparin: 221.5 mL    Insulin: 81 mL    IV PiggyBack: 450 mL    Milrinone: 161.2 mL  Total IN: 1048.9 mL    OUT:    Vasopressin: 0 mL    Voided (mL): 2875 mL  Total OUT: 2875 mL    Total NET: -1826.1 mL        ============================ LABS =========================                        11.0   21.40 )-----------( 184      ( 21 Mar 2022 01:05 )             33.5     03-21    125<L>  |  89<L>  |  14  ----------------------------<  167<H>  3.6   |  24  |  0.70    Ca    8.2<L>      21 Mar 2022 01:05  Phos  2.5     -  Mg     1.8     -    TPro  6.2  /  Alb  3.6  /  TBili  0.6  /  DBili  x   /  AST  135<H>  /  ALT  53<H>  /  AlkPhos  65  -21    LIVER FUNCTIONS - ( 21 Mar 2022 01:05 )  Alb: 3.6 g/dL / Pro: 6.2 g/dL / ALK PHOS: 65 U/L / ALT: 53 U/L / AST: 135 U/L / GGT: x           PT/INR - ( 21 Mar 2022 02:05 )   PT: 19.0 sec;   INR: 1.64 ratio         PTT - ( 21 Mar 2022 17:57 )  PTT:62.2 sec  ABG - ( 21 Mar 2022 18:45 )  pH, Arterial: 7.48  pH, Blood: x     /  pCO2: 39    /  pO2: 110   / HCO3: 29    / Base Excess: 5.1   /  SaO2: 98.4              Urinalysis Basic - ( 20 Mar 2022 01:08 )    Color: Red / Appearance: Slightly Turbid / S.010 / pH: x  Gluc: x / Ketone: Negative  / Bili: Negative / Urobili: Negative   Blood: x / Protein: 30 mg/dL / Nitrite: Negative   Leuk Esterase: Negative / RBC: >50 /hpf / WBC 2 /HPF   Sq Epi: x / Non Sq Epi: 3 / Bacteria: Negative      ======================Micro/Rad/Cardio=================  Culture: Reviewed   CXR: Reviewed  Echo: Reviewed  ======================================================  PAST MEDICAL & SURGICAL HISTORY:  No pertinent past medical history    No significant past surgical history      ========================ASSESSMENT ================  NSTEMI   Multivessel CAD   Complicated with cardiogenic shock   S/P IABP placement for severe LV Systolic failure  Lactic acidosis  Hyperglycemia     Plan:  ====================== NEUROLOGY=====================  Continue close monitoring of neuro status   Tylenol PRN for fevers     acetaminophen     Tablet .. 650 milliGRAM(s) Oral every 6 hours PRN Temp greater or equal to 38.5C (101.3F), Moderate Pain (4 - 6)    ==================== RESPIRATORY======================  Supplemental O2 via 6L NC  Encourage incentive spirometry, continue pulse ox monitoring, follow ABGs     ====================CARDIOVASCULAR==================  NSTEMI, multivessle CAD S/P IABP placement for severe LV Systolic failure  Continue invasive hemodynamic monitoring   Lactate 1.2, continue trending   IABP 1:1 with good augmentation   Inotropic support with IV dobutamine and IV primacor   Continue pressor support with IV vasopressin   TTE: EF 20-25%, severe global systolic dysfnx, RV size normal with decreased fxn, no LV thrombus  ASA/ Lipitor for CAD    DOBUTamine Infusion 1.5 MICROgram(s)/kG/Min (4.61 mL/Hr) IV Continuous <Continuous>  aspirin enteric coated 81 milliGRAM(s) Oral daily  milrinone Infusion 0.3 MICROgram(s)/kG/Min (9.23 mL/Hr) IV Continuous <Continuous>  vasopressin Infusion 0.033 Unit(s)/Min (2 mL/Hr) IV Continuous <Continuous>  atorvastatin 80 milliGRAM(s) Oral at bedtime    ===================HEMATOLOGIC/ONC ===================  Monitor H&H/Plts     VTE prophylaxis, heparin  Infusion 1500 Unit(s)/Hr (18 mL/Hr) IV Continuous <Continuous>    ===================== RENAL =========================  Acidosis  Continue monitoring I&Os, BUN/Cr, and urine output   Replete lytes PRN. Keep K> 4 and Mg >2   Continue to diurese with IV Lasix     furosemide Infusion 10 mG/Hr (5 mL/Hr) IV Continuous <Continuous>  ==================== GASTROINTESTINAL===================  Tolerating a consistent carb diet   Bowel regimen with Senna     GI prophylaxis, pantoprazole    Tablet 40 milliGRAM(s) Oral before breakfast  senna 2 Tablet(s) Oral at bedtime  sodium chloride 0.9% lock flush 3 milliLiter(s) IV Push every 8 hours    =======================    ENDOCRINE  =====================  Stress hyperglycemia, continue glucose control with insulin gtt     dextrose 50% Injectable 50 milliLiter(s) IV Push every 15 minutes  dextrose 50% Injectable 25 milliLiter(s) IV Push every 15 minutes  insulin regular Infusion 6 Unit(s)/Hr (6 mL/Hr) IV Continuous <Continuous>    ========================INFECTIOUS DISEASE================  Temp 100F, WBC rising 18.48 -> 21.40   Continue trending WBC and monitoring fever curve     Patient requires continuous monitoring with bedside rhythm monitoring, pulse oximetry monitoring, and continuous central venous and arterial pressure monitoring; and intermittent blood gas analysis.  Care plan discussed with ICU care team.    Patient remained critical, at risk for life threatening decompensation.   I have spent 35 minutes providing acute care with multiple re-evaluations throughout the evening.     By signing my name below, I, Kelli Beckman, attest that this documentation has been prepared under the direction and in the presence of Tatianna Altman NP.  Electronically signed: Evan Rasmussen    I, Tatianna Altman, personally performed the services described in this documentation. All medical record entries made by the scribe were at my direction and in my presence. I have reviewed the chart and agree that the record reflects my personal performance and is accurate and complete  Electronically signed: Tatianna Altman NP, 22 @ 19:47

## 2022-03-21 NOTE — PROGRESS NOTE ADULT - ATTENDING COMMENTS
65 y/o male with no prior known medical history who presented to [facility] with chest pain, diaphoresis and emesis. He was found to have new LBBB and elevated cardiac biomarkers (Trop I 12.6K, proBNP 7k). He was transferred to [facility] for cardiac catheterization which revealed multivessel CAD. His TTE demonstrated  LVEF 20-25% with +WMA. Due to cardiogenic shock an IABP was placed and he was started on inotropic support as well as lasix gtt. Initial lactate peaked at 3.0 but has now normalized. LFTs are improving and renal function is preserved. He remains chest pain free.    Overnight had low BP associated to low CI requiring addition of  on top of milrinone gtt and IABP 1:1. His hemodynamics are improved this am, however he remains tachycardic.   [facility] is planning CABG early this week. Will continue optimization, he will likely need tMCS perioperatively. Will discuss with our surgical team advanced therapies eval as backup (?LVAD, uncontrolled DM precludes heart tx).

## 2022-03-21 NOTE — PROGRESS NOTE ADULT - PROBLEM SELECTOR PLAN 1
- continue ASA 81 mg daily  - continue lipitor 80 mg daily  - f/u P2Y12 level; waiting for second anti-platelet level to be washed out  - CT sx for revascularization - Intermacs 2/SCAI stage C  - Continue hemodynamic monitoring with PA catheter  - tMCS: s/p IABP, on 1:1. Hep gtt for AC.   - Inotropes: milrinone gtt @ 0.4 mcg/kg/min and dobutamine 1.5 mcg/kg/min; wean dobutamine as able  - Monitor perfusion labs daily: lactic acid, LFTs, SVo2  - Goals: CVP < 10, pcwp <15, CI > 2.2, UO > 50cc/h, lactate < 2.2   - Will likely need tMCS perioperatively.

## 2022-03-21 NOTE — PROGRESS NOTE ADULT - SUBJECTIVE AND OBJECTIVE BOX
Patient seen and examined at bedside.    Overnight Events: no events, issues or complaints    Review of Systems:  REVIEW OF SYSTEMS:  CONSTITUTIONAL: No weakness, fevers or chills  EYES/ENT: No visual changes;  No dysphagia  NECK: No pain or stiffness  RESPIRATORY: No cough, wheezing, hemoptysis; No shortness of breath  CARDIOVASCULAR: No chest pain or palpitations; No lower extremity edema  GASTROINTESTINAL: No abdominal or epigastric pain. No nausea, vomiting, or hematemesis; No diarrhea or constipation. No melena or hematochezia.  BACK: No back pain  GENITOURINARY: No dysuria, frequency or hematuria  NEUROLOGICAL: No numbness or weakness  SKIN: No itching, burning, rashes, or lesions   All other review of systems is negative unless indicated above.    [x ] All other systems negative  [ ] Unable to assess ROS due to    Current Meds:  acetaminophen     Tablet .. 650 milliGRAM(s) Oral every 6 hours PRN  aspirin enteric coated 81 milliGRAM(s) Oral daily  atorvastatin 80 milliGRAM(s) Oral at bedtime  belladonna 16.2 mG/opium 30 mg Suppository 1 Suppository(s) Rectal every 8 hours PRN  chlorhexidine 2% Cloths 1 Application(s) Topical <User Schedule>  chlorhexidine 4% Liquid 1 Application(s) Topical <User Schedule>  dextrose 50% Injectable 50 milliLiter(s) IV Push every 15 minutes  dextrose 50% Injectable 25 milliLiter(s) IV Push every 15 minutes  DOBUTamine Infusion 1.5 MICROgram(s)/kG/Min IV Continuous <Continuous>  furosemide Infusion 10 mG/Hr IV Continuous <Continuous>  heparin  Infusion 1500 Unit(s)/Hr IV Continuous <Continuous>  insulin regular Infusion 6 Unit(s)/Hr IV Continuous <Continuous>  lidocaine   4% Patch 1 Patch Transdermal every 24 hours  milrinone Infusion 0.3 MICROgram(s)/kG/Min IV Continuous <Continuous>  pantoprazole    Tablet 40 milliGRAM(s) Oral before breakfast  potassium chloride  10 mEq/50 mL IVPB 10 milliEquivalent(s) IV Intermittent every 1 hour  senna 2 Tablet(s) Oral at bedtime  sodium chloride 0.9% lock flush 3 milliLiter(s) IV Push every 8 hours  vasopressin Infusion 0.033 Unit(s)/Min IV Continuous <Continuous>      PAST MEDICAL & SURGICAL HISTORY:  No pertinent past medical history    No significant past surgical history        Vitals:  T(F): 98.8 (03-21), Max: 100.8 (03-20)  HR: 109 (03-21) (90 - 120)  BP: --  RR: 23 (03-21)  SpO2: 100% (03-21)  I&O's Summary    20 Mar 2022 07:01  -  21 Mar 2022 07:00  --------------------------------------------------------  IN: 1530.6 mL / OUT: 3545 mL / NET: -2014.4 mL    21 Mar 2022 07:01  -  21 Mar 2022 13:19  --------------------------------------------------------  IN: 593 mL / OUT: 1850 mL / NET: -1257 mL        Physical Exam:  General Appearance: well appearing, normal for age and gender. 	  Neck: normal JVP, no bruit.   Cardiovascular: regular rate and rhythm S1 S2, No JVD, No murmurs, No edema  Respiratory: Lungs clear to auscultation	  Psychiatry: Alert and oriented x 3, Mood & affect appropriate  Gastrointestinal:  Soft, Non-tender  Skin/Integumen: No rashes, No ecchymoses, No cyanosis	  Neurologic: Non-focal  Musculoskeletal/extremities: Normal range of motion, No clubbing, cyanosis or edema  Vascular: Peripheral pulses palpable 2+ bilaterally; IABP access site c/d/i                            11.0   21.40 )-----------( 184      ( 21 Mar 2022 01:05 )             33.5     03-21    125<L>  |  89<L>  |  14  ----------------------------<  167<H>  3.6   |  24  |  0.70    Ca    8.2<L>      21 Mar 2022 01:05  Phos  2.5     03-21  Mg     1.8     03-21    TPro  6.2  /  Alb  3.6  /  TBili  0.6  /  DBili  x   /  AST  135<H>  /  ALT  53<H>  /  AlkPhos  65  03-21    PT/INR - ( 21 Mar 2022 02:05 )   PT: 19.0 sec;   INR: 1.64 ratio         PTT - ( 21 Mar 2022 12:29 )  PTT:60.9 sec  CARDIAC MARKERS ( 21 Mar 2022 01:05 )  x     / x     / 870 U/L / x     / 14.9 ng/mL  CARDIAC MARKERS ( 20 Mar 2022 00:27 )  x     / x     / 2010 U/L / x     / 97.5 ng/mL  CARDIAC MARKERS ( 19 Mar 2022 17:08 )  x     / x     / 1593 U/L / x     / 96.1 ng/mL      Serum Pro-Brain Natriuretic Peptide: 7087 pg/mL (03-20 @ 02:54)  Serum Pro-Brain Natriuretic Peptide: 6715 pg/mL (03-19 @ 17:08)

## 2022-03-21 NOTE — PROGRESS NOTE ADULT - ASSESSMENT
65 y/o Male with no PMH presented to [place] complaining of chest heaviness/pain since last night, associated with diaphoresis and emesis. Patient took 324 ASA this morning. EKG revealed new LBBB. Patient was transferred to [facility] and cardiac cath performed for NSTEMI which demonstrated severe multivessel CAD. Patient was in cardiogenic shock, and IABP was placed and Dobutamine drip was started. TTE demonstrated EF of 20-25%, RV size normal but decreased function. No LV thrombus. s/p IABP. Patient was transferred and admitted to CTU for further management.    BNP 7087, Tn 3580   Cardiac studies:   LHC 3/19/2022: Multivessel CAD  Echo 3/19/2022: EF 20-25%, no LV thrombus, minimal MR, RV normal size with decreased function.     PAC 3/20/2022 AM: CO/CI 5.7/2.5; , PA 32/15/22; CVP 5; MAP 70;

## 2022-03-21 NOTE — PROGRESS NOTE ADULT - ASSESSMENT
66 year old male admitted for NSTEMI currently on heparin gtt, who was found ot have heamturia after darden catheter placement    - Urine now clear yellow  -no need for CBI at this time  -trend H/H and transfuse accordingly  -TOV prior to discharge  -follow up with Dr Vital as an outpatient for full hematuria workup  - Please reach out with additional questions or if hematuria returns

## 2022-03-21 NOTE — PROGRESS NOTE ADULT - SUBJECTIVE AND OBJECTIVE BOX
Patient seen and examined at the bedside.    Remained critically ill on continuous ICU monitoring.    OBJECTIVE:  Vital Signs Last 24 Hrs  T(C): 37.4 (21 Mar 2022 04:00), Max: 38.2 (20 Mar 2022 16:00)  T(F): 99.3 (21 Mar 2022 04:00), Max: 100.8 (20 Mar 2022 16:00)  HR: 102 (21 Mar 2022 07:15) (90 - 109)  BP: --  BP(mean): --  RR: 21 (21 Mar 2022 07:15) (18 - 52)  SpO2: 100% (21 Mar 2022 07:15) (89% - 100%)      Physical Exam:   General: NAD   Neurology: nonfocal   Eyes: bilateral pupils equal and reactive   ENT/Neck: Neck supple, trachea midline, No JVD   Respiratory: Rales noted bilaterally   CV: ST   Abdominal: Soft, NT, ND +BS,   Extremities: no edema, + peripheral pulses, R femoral IABP   Skin: No Rashes, Hematoma, Ecchymosis                                        Assessment:  65 y/o Male with no PMHX     Admitted STEMI /  Multivessel CAD / complicated with cardiogenic shock   S/P IABP placement for severe LV Systolic failure  Lactic acidosis  Hyperglycemia     Plan:   ***Neuro***  [x] Nonfocal     Continue routine neuro assessment     ***Cardiovascular***  Invasive hemodynamic monitoring, assess perfusion indices   Sinus tach / CVP 10 / PAP 2  / MAP 76 /  CI 2.5 /Hct 36.0% / Lactate 0.9   [x] Primacor 0.4mcg [x] Dobutamine 1.5mcg [x] Vasopressin - weaned to off overnight   [x] IABP with good diastolic augmentation at 1:1   Monitor RLE Perfusion & any bleeding complication  [x] AC therapy with heparin gtt, PTT 70-99  [x] ASA [x] Statin   Serial EKG and cardiac enzymes   Trend P2Y12     ***Pulmonary***  Supplemental O2 via 4L NC   Encourage incentive spirometry, continue pulse ox monitoring, follow ABGs     ***GI***  [x] Diet: PO consistent carb diet  [x] Protonix  Bowel regimen with Senna     ***Renal***  Continue to monitor I/Os, BUN/Creatinine.   Replete lytes PRN  Jovel present [x]  negative   Diuresis with IV Lasix     ***ID***  No active antibiotic coverage      ***Endocrine***  [x] Stress Hyperglycemia : HbA1c 11.4%               - [x] Insulin gtt               - Need tight glycemic control to prevent wound infection.          Patient requires continuous monitoring with bedside rhythm monitoring, pulse oximetry monitoring, and continuous central venous and arterial pressure monitoring; and intermittent blood gas analysis. Care plan discussed with the ICU care team.   Patient remained critical, at risk for life threatening decompensation.    I have spent 30 minutes providing critical care management to this patient.    By signing my name below, I, Cheryl Jackson, attest that this documentation has been prepared under the direction and in the presence of Jeffrey Fang MD   Electronically signed: Evan Wilks, 03-21-22 @ 07:41    I, Jeffrey Fang, personally performed the services described in this documentation. all medical record entries made by the pauloibmehnaz were at my direction and in my presence. I have reviewed the chart and agree that the record reflects my personal performance and is accurate and complete  Electronically signed: Jeffrey Fang MD  Patient seen and examined at the bedside.    Remained critically ill on continuous ICU monitoring.    OBJECTIVE:  Vital Signs Last 24 Hrs  T(C): 37.4 (21 Mar 2022 04:00), Max: 38.2 (20 Mar 2022 16:00)  T(F): 99.3 (21 Mar 2022 04:00), Max: 100.8 (20 Mar 2022 16:00)  HR: 102 (21 Mar 2022 07:15) (90 - 109)  BP: --  BP(mean): --  RR: 21 (21 Mar 2022 07:15) (18 - 52)  SpO2: 100% (21 Mar 2022 07:15) (89% - 100%)      Physical Exam:   General: NAD   Neurology: nonfocal   Eyes: bilateral pupils equal and reactive   ENT/Neck: Neck supple, trachea midline, No JVD   Respiratory: Rales noted bilaterally   CV: ST   Abdominal: Soft, NT, ND +BS,   Extremities: no edema, + peripheral pulses, R femoral IABP   Skin: No Rashes, Hematoma, Ecchymosis                                        Assessment:  67 y/o Male with no PMHX     Admitted STEMI w/ new LBBB /  Multivessel CAD / complicated with cardiogenic shock   S/P IABP placement for severe LV Systolic failure on 3/19   Pulmonary edema   Lactic acidosis  Stress hyperglycemia     Plan:   ***Neuro***  [x] Nonfocal     Continue routine neuro assessment     ***Cardiovascular***  Invasive hemodynamic monitoring, assess perfusion indices   Sinus tach / CVP 10 / PAP 2  / MAP 76 /  CI 2.8 /Hct 36.0% / Lactate 0.9   [x] Primacor 0.4mcg [x] Dobutamine restarted thus AM at 1.5mcg [x] Vasopressin - weaned to off overnight   [x] IABP with good diastolic augmentation at 1:1   Monitor RLE Perfusion & any bleeding complication  [x] AC therapy with heparin gtt, PTT 70-99  [x] ASA [x] Statin   Serial EKG and cardiac enzymes   Trend P2Y12     ***Pulmonary***  Transitioned to HFO2 overnight in setting of pulmonary edema , on 50L/100%   Encourage incentive spirometry, continue pulse ox monitoring, follow ABGs     ***GI***  [x] Diet: PO consistent carb diet  [x] Protonix  Bowel regimen with Senna     ***Renal***  Continue to monitor I/Os, BUN/Creatinine.   Replete lytes PRN  Jovel present [x]  negative   Diuresis with IV Lasix     ***ID***  No active antibiotic coverage      ***Endocrine***  [x] Stress Hyperglycemia : HbA1c 11.4%               - [x] Insulin gtt               - Need tight glycemic control to prevent wound infection.          Patient requires continuous monitoring with bedside rhythm monitoring, pulse oximetry monitoring, and continuous central venous and arterial pressure monitoring; and intermittent blood gas analysis. Care plan discussed with the ICU care team.   Patient remained critical, at risk for life threatening decompensation.    I have spent 30 minutes providing critical care management to this patient.    By signing my name below, I, Cheryl Jackson, attest that this documentation has been prepared under the direction and in the presence of Jeffrey Fang MD   Electronically signed: Evan Wilks, 03-21-22 @ 07:41    I, Jeffrey Fang, personally performed the services described in this documentation. all medical record entries made by the scribe were at my direction and in my presence. I have reviewed the chart and agree that the record reflects my personal performance and is accurate and complete  Electronically signed: Jeffrey Fang MD  Patient seen and examined at the bedside.    Remained critically ill on continuous ICU monitoring.    OBJECTIVE:  Vital Signs Last 24 Hrs  T(C): 37.4 (21 Mar 2022 04:00), Max: 38.2 (20 Mar 2022 16:00)  T(F): 99.3 (21 Mar 2022 04:00), Max: 100.8 (20 Mar 2022 16:00)  HR: 102 (21 Mar 2022 07:15) (90 - 109)  BP: --  BP(mean): --  RR: 21 (21 Mar 2022 07:15) (18 - 52)  SpO2: 100% (21 Mar 2022 07:15) (89% - 100%)      Physical Exam:   General: NAD   Neurology: nonfocal   Eyes: bilateral pupils equal and reactive   ENT/Neck: Neck supple, trachea midline, No JVD   Respiratory: Rales noted bilaterally   CV: ST   Abdominal: Soft, NT, ND +BS,   Extremities: no edema, + peripheral pulses, R femoral IABP   Skin: No Rashes, Hematoma, Ecchymosis                                        Assessment:  67 y/o Male with no PMHX     Admitted STEMI w/ new LBBB /  Multivessel CAD / complicated with cardiogenic shock   S/P IABP placement for severe LV Systolic failure on 3/19   Pulmonary edema   Lactic acidosis  Stress hyperglycemia     Plan:   ***Neuro***  [x] Nonfocal     Continue routine neuro assessment     ***Cardiovascular***  Invasive hemodynamic monitoring, assess perfusion indices   Sinus tach / CVP 10 / PAP 2  / MAP 76 /  CI 2.8 /Hct 36.0% / Lactate 0.9   [x] Primacor 0.4mcg [x] Dobutamine restarted thus AM at 1.5mcg [x] Vasopressin - weaned to off overnight   [x] IABP with good diastolic augmentation at 1:1   Monitor RLE Perfusion & any bleeding complication  [x] AC therapy with heparin gtt, PTT 70-99  [x] ASA [x] Statin   Serial EKG and cardiac enzymes   Trend P2Y12     ***Pulmonary***  Transitioned to HFO2 overnight in setting of pulmonary edema , on 50L/100%   Encourage incentive spirometry, continue pulse ox monitoring, follow ABGs     ***GI***  [x] Diet: PO consistent carb diet  [x] Protonix  Bowel regimen with Senna     ***Renal***  Continue to monitor I/Os, BUN/Creatinine.   Replete lytes PRN  Jovel present [x]  negative   Diuresis with IV Lasix     ***ID***  No active antibiotic coverage      ***Endocrine***  [x] Stress Hyperglycemia : HbA1c 11.4%               - [x] Insulin gtt               - Need tight glycemic control to prevent wound infection.    Patient requires continuous monitoring with bedside rhythm monitoring, pulse oximetry monitoring, and continuous central venous and arterial pressure monitoring; and intermittent blood gas analysis. Care plan discussed with the ICU care team.   Patient remained critical, at risk for life threatening decompensation.    I have spent 75 minutes providing critical care management to this patient.    By signing my name below, I, Cheryl Jackson, attest that this documentation has been prepared under the direction and in the presence of Jeffrey Fang MD   Electronically signed: Evan Wilks, 03-21-22 @ 07:41    I, Jeffrey Fang, personally performed the services described in this documentation. all medical record entries made by the scribe were at my direction and in my presence. I have reviewed the chart and agree that the record reflects my personal performance and is accurate and complete  Electronically signed: Jeffrey Fang MD

## 2022-03-21 NOTE — PROGRESS NOTE ADULT - PROBLEM SELECTOR PLAN 2
- perfusion markers stabilized;   - s/p IABP, on 1:1  - on milrinone 0.4 and dobutamine 1.5; wean dobutamine  - con't lasix drip at 10mg/hour  - con't hep gtt  - CAD management as above - ACS: new LBBB+elevated cardiac biomarkers  - multivessel CAD  - continue ASA 81 mg daily  - continue lipitor 80 mg daily  - f/u P2Y12 level; waiting for second anti-platelet level to be washed out  - CT sx for surgical revascularization

## 2022-03-21 NOTE — PROGRESS NOTE ADULT - PROBLEM SELECTOR PLAN 3
- see above In setting of ischemic cardiomyopathy LVEF 35% LVIDd 5.1 cm  Diuretics: lasix gtt @  GDMT on hold due to cardiogenic shock

## 2022-03-22 LAB
ALBUMIN SERPL ELPH-MCNC: 3.4 G/DL — SIGNIFICANT CHANGE UP (ref 3.3–5)
ALP SERPL-CCNC: 80 U/L — SIGNIFICANT CHANGE UP (ref 40–120)
ALT FLD-CCNC: 47 U/L — HIGH (ref 10–45)
ANION GAP SERPL CALC-SCNC: 13 MMOL/L — SIGNIFICANT CHANGE UP (ref 5–17)
APTT BLD: 57.9 SEC — HIGH (ref 27.5–35.5)
APTT BLD: 65.4 SEC — HIGH (ref 27.5–35.5)
APTT BLD: 67.2 SEC — HIGH (ref 27.5–35.5)
APTT BLD: 68.2 SEC — HIGH (ref 27.5–35.5)
APTT BLD: 69.7 SEC — HIGH (ref 27.5–35.5)
AST SERPL-CCNC: 62 U/L — HIGH (ref 10–40)
BASE EXCESS BLDMV CALC-SCNC: 4.8 MMOL/L — HIGH (ref -3–3)
BASE EXCESS BLDMV CALC-SCNC: 5.1 MMOL/L — HIGH (ref -3–3)
BASE EXCESS BLDMV CALC-SCNC: 5.8 MMOL/L — HIGH (ref -3–3)
BASE EXCESS BLDMV CALC-SCNC: 6.4 MMOL/L — HIGH (ref -3–3)
BASOPHILS # BLD AUTO: 0.04 K/UL — SIGNIFICANT CHANGE UP (ref 0–0.2)
BASOPHILS NFR BLD AUTO: 0.3 % — SIGNIFICANT CHANGE UP (ref 0–2)
BILIRUB SERPL-MCNC: 0.6 MG/DL — SIGNIFICANT CHANGE UP (ref 0.2–1.2)
BUN SERPL-MCNC: 11 MG/DL — SIGNIFICANT CHANGE UP (ref 7–23)
CALCIUM SERPL-MCNC: 8.5 MG/DL — SIGNIFICANT CHANGE UP (ref 8.4–10.5)
CHLORIDE SERPL-SCNC: 89 MMOL/L — LOW (ref 96–108)
CK MB BLD-MCNC: 1.6 % — SIGNIFICANT CHANGE UP (ref 0–3.5)
CK MB CFR SERPL CALC: 4.6 NG/ML — SIGNIFICANT CHANGE UP (ref 0–6.7)
CK SERPL-CCNC: 291 U/L — HIGH (ref 30–200)
CO2 BLDMV-SCNC: 31 MMOL/L — HIGH (ref 21–29)
CO2 BLDMV-SCNC: 32 MMOL/L — HIGH (ref 21–29)
CO2 BLDMV-SCNC: 33 MMOL/L — HIGH (ref 21–29)
CO2 BLDMV-SCNC: 33 MMOL/L — HIGH (ref 21–29)
CO2 SERPL-SCNC: 26 MMOL/L — SIGNIFICANT CHANGE UP (ref 22–31)
CREAT SERPL-MCNC: 0.7 MG/DL — SIGNIFICANT CHANGE UP (ref 0.5–1.3)
EGFR: 102 ML/MIN/1.73M2 — SIGNIFICANT CHANGE UP
EOSINOPHIL # BLD AUTO: 0.01 K/UL — SIGNIFICANT CHANGE UP (ref 0–0.5)
EOSINOPHIL NFR BLD AUTO: 0.1 % — SIGNIFICANT CHANGE UP (ref 0–6)
GAS PNL BLDA: SIGNIFICANT CHANGE UP
GAS PNL BLDMV: SIGNIFICANT CHANGE UP
GLUCOSE BLDC GLUCOMTR-MCNC: 110 MG/DL — HIGH (ref 70–99)
GLUCOSE BLDC GLUCOMTR-MCNC: 114 MG/DL — HIGH (ref 70–99)
GLUCOSE BLDC GLUCOMTR-MCNC: 114 MG/DL — HIGH (ref 70–99)
GLUCOSE BLDC GLUCOMTR-MCNC: 115 MG/DL — HIGH (ref 70–99)
GLUCOSE BLDC GLUCOMTR-MCNC: 115 MG/DL — HIGH (ref 70–99)
GLUCOSE BLDC GLUCOMTR-MCNC: 117 MG/DL — HIGH (ref 70–99)
GLUCOSE BLDC GLUCOMTR-MCNC: 118 MG/DL — HIGH (ref 70–99)
GLUCOSE BLDC GLUCOMTR-MCNC: 119 MG/DL — HIGH (ref 70–99)
GLUCOSE BLDC GLUCOMTR-MCNC: 123 MG/DL — HIGH (ref 70–99)
GLUCOSE BLDC GLUCOMTR-MCNC: 126 MG/DL — HIGH (ref 70–99)
GLUCOSE BLDC GLUCOMTR-MCNC: 131 MG/DL — HIGH (ref 70–99)
GLUCOSE BLDC GLUCOMTR-MCNC: 136 MG/DL — HIGH (ref 70–99)
GLUCOSE BLDC GLUCOMTR-MCNC: 148 MG/DL — HIGH (ref 70–99)
GLUCOSE BLDC GLUCOMTR-MCNC: 181 MG/DL — HIGH (ref 70–99)
GLUCOSE BLDC GLUCOMTR-MCNC: 181 MG/DL — HIGH (ref 70–99)
GLUCOSE BLDC GLUCOMTR-MCNC: 210 MG/DL — HIGH (ref 70–99)
GLUCOSE BLDC GLUCOMTR-MCNC: 211 MG/DL — HIGH (ref 70–99)
GLUCOSE BLDC GLUCOMTR-MCNC: 221 MG/DL — HIGH (ref 70–99)
GLUCOSE BLDC GLUCOMTR-MCNC: 231 MG/DL — HIGH (ref 70–99)
GLUCOSE BLDC GLUCOMTR-MCNC: 231 MG/DL — HIGH (ref 70–99)
GLUCOSE BLDC GLUCOMTR-MCNC: 246 MG/DL — HIGH (ref 70–99)
GLUCOSE SERPL-MCNC: 131 MG/DL — HIGH (ref 70–99)
HCO3 BLDMV-SCNC: 30 MMOL/L — HIGH (ref 20–28)
HCO3 BLDMV-SCNC: 30 MMOL/L — HIGH (ref 20–28)
HCO3 BLDMV-SCNC: 31 MMOL/L — HIGH (ref 20–28)
HCO3 BLDMV-SCNC: 31 MMOL/L — HIGH (ref 20–28)
HCT VFR BLD CALC: 32.6 % — LOW (ref 39–50)
HGB BLD-MCNC: 10.9 G/DL — LOW (ref 13–17)
HOROWITZ INDEX BLDMV+IHG-RTO: 44 — SIGNIFICANT CHANGE UP
HOROWITZ INDEX BLDMV+IHG-RTO: 60 — SIGNIFICANT CHANGE UP
IMM GRANULOCYTES NFR BLD AUTO: 0.5 % — SIGNIFICANT CHANGE UP (ref 0–1.5)
INR BLD: 1.52 RATIO — HIGH (ref 0.88–1.16)
LDH SERPL L TO P-CCNC: 781 U/L — HIGH (ref 50–242)
LYMPHOCYTES # BLD AUTO: 26.4 % — SIGNIFICANT CHANGE UP (ref 13–44)
LYMPHOCYTES # BLD AUTO: 4.02 K/UL — HIGH (ref 1–3.3)
MAGNESIUM SERPL-MCNC: 1.8 MG/DL — SIGNIFICANT CHANGE UP (ref 1.6–2.6)
MCHC RBC-ENTMCNC: 28.9 PG — SIGNIFICANT CHANGE UP (ref 27–34)
MCHC RBC-ENTMCNC: 33.4 GM/DL — SIGNIFICANT CHANGE UP (ref 32–36)
MCV RBC AUTO: 86.5 FL — SIGNIFICANT CHANGE UP (ref 80–100)
MONOCYTES # BLD AUTO: 1.27 K/UL — HIGH (ref 0–0.9)
MONOCYTES NFR BLD AUTO: 8.3 % — SIGNIFICANT CHANGE UP (ref 2–14)
NEUTROPHILS # BLD AUTO: 9.8 K/UL — HIGH (ref 1.8–7.4)
NEUTROPHILS NFR BLD AUTO: 64.4 % — SIGNIFICANT CHANGE UP (ref 43–77)
NRBC # BLD: 0 /100 WBCS — SIGNIFICANT CHANGE UP (ref 0–0)
O2 CT VFR BLD CALC: 35 MMHG — SIGNIFICANT CHANGE UP (ref 30–65)
O2 CT VFR BLD CALC: 41 MMHG — SIGNIFICANT CHANGE UP (ref 30–65)
O2 CT VFR BLD CALC: 43 MMHG — SIGNIFICANT CHANGE UP (ref 30–65)
O2 CT VFR BLD CALC: 43 MMHG — SIGNIFICANT CHANGE UP (ref 30–65)
PA ADP PRP-ACNC: 258 PRU — SIGNIFICANT CHANGE UP (ref 194–417)
PCO2 BLDMV: 45 MMHG — SIGNIFICANT CHANGE UP (ref 30–65)
PCO2 BLDMV: 45 MMHG — SIGNIFICANT CHANGE UP (ref 30–65)
PCO2 BLDMV: 47 MMHG — SIGNIFICANT CHANGE UP (ref 30–65)
PCO2 BLDMV: 47 MMHG — SIGNIFICANT CHANGE UP (ref 30–65)
PH BLDMV: 7.42 — SIGNIFICANT CHANGE UP (ref 7.32–7.45)
PH BLDMV: 7.43 — SIGNIFICANT CHANGE UP (ref 7.32–7.45)
PH BLDMV: 7.43 — SIGNIFICANT CHANGE UP (ref 7.32–7.45)
PH BLDMV: 7.45 — SIGNIFICANT CHANGE UP (ref 7.32–7.45)
PHOSPHATE SERPL-MCNC: 2.4 MG/DL — LOW (ref 2.5–4.5)
PLATELET # BLD AUTO: 223 K/UL — SIGNIFICANT CHANGE UP (ref 150–400)
POTASSIUM SERPL-MCNC: 3.9 MMOL/L — SIGNIFICANT CHANGE UP (ref 3.5–5.3)
POTASSIUM SERPL-SCNC: 3.9 MMOL/L — SIGNIFICANT CHANGE UP (ref 3.5–5.3)
PROT SERPL-MCNC: 6.2 G/DL — SIGNIFICANT CHANGE UP (ref 6–8.3)
PROTHROM AB SERPL-ACNC: 17.7 SEC — HIGH (ref 10.5–13.4)
PSA FLD-MCNC: 10.3 NG/ML — HIGH (ref 0–4)
RBC # BLD: 3.77 M/UL — LOW (ref 4.2–5.8)
RBC # FLD: 12.3 % — SIGNIFICANT CHANGE UP (ref 10.3–14.5)
SAO2 % BLDMV: 69.5 — SIGNIFICANT CHANGE UP (ref 60–90)
SAO2 % BLDMV: 72.5 — SIGNIFICANT CHANGE UP (ref 60–90)
SAO2 % BLDMV: 72.7 — SIGNIFICANT CHANGE UP (ref 60–90)
SAO2 % BLDMV: 74.1 — SIGNIFICANT CHANGE UP (ref 60–90)
SARS-COV-2 RNA SPEC QL NAA+PROBE: SIGNIFICANT CHANGE UP
SODIUM SERPL-SCNC: 128 MMOL/L — LOW (ref 135–145)
TROPONIN T, HIGH SENSITIVITY RESULT: 2815 NG/L — HIGH (ref 0–51)
WBC # BLD: 15.22 K/UL — HIGH (ref 3.8–10.5)
WBC # FLD AUTO: 15.22 K/UL — HIGH (ref 3.8–10.5)

## 2022-03-22 PROCEDURE — 99233 SBSQ HOSP IP/OBS HIGH 50: CPT | Mod: GC

## 2022-03-22 PROCEDURE — 99291 CRITICAL CARE FIRST HOUR: CPT

## 2022-03-22 PROCEDURE — 71045 X-RAY EXAM CHEST 1 VIEW: CPT | Mod: 26

## 2022-03-22 PROCEDURE — 99292 CRITICAL CARE ADDL 30 MIN: CPT

## 2022-03-22 PROCEDURE — 99292 CRITICAL CARE ADDL 30 MIN: CPT | Mod: 25

## 2022-03-22 RX ORDER — NALOXONE HYDROCHLORIDE 4 MG/.1ML
0.4 SPRAY NASAL ONCE
Refills: 0 | Status: COMPLETED | OUTPATIENT
Start: 2022-03-22 | End: 2022-03-22

## 2022-03-22 RX ORDER — POTASSIUM CHLORIDE 20 MEQ
10 PACKET (EA) ORAL ONCE
Refills: 0 | Status: COMPLETED | OUTPATIENT
Start: 2022-03-22 | End: 2022-03-22

## 2022-03-22 RX ORDER — CEFUROXIME AXETIL 250 MG
1500 TABLET ORAL ONCE
Refills: 0 | Status: DISCONTINUED | OUTPATIENT
Start: 2022-03-22 | End: 2022-03-23

## 2022-03-22 RX ORDER — POTASSIUM CHLORIDE 20 MEQ
10 PACKET (EA) ORAL
Refills: 0 | Status: COMPLETED | OUTPATIENT
Start: 2022-03-22 | End: 2022-03-22

## 2022-03-22 RX ORDER — CHLORHEXIDINE GLUCONATE 213 G/1000ML
1 SOLUTION TOPICAL ONCE
Refills: 0 | Status: COMPLETED | OUTPATIENT
Start: 2022-03-22 | End: 2022-03-22

## 2022-03-22 RX ORDER — MAGNESIUM SULFATE 500 MG/ML
2 VIAL (ML) INJECTION DAILY
Refills: 0 | Status: DISCONTINUED | OUTPATIENT
Start: 2022-03-22 | End: 2022-03-23

## 2022-03-22 RX ORDER — ATROPA BELLADONNA AND OPIUM 16.2; 6 MG/1; MG/1
1 SUPPOSITORY RECTAL ONCE
Refills: 0 | Status: DISCONTINUED | OUTPATIENT
Start: 2022-03-22 | End: 2022-03-22

## 2022-03-22 RX ORDER — DEXMEDETOMIDINE HYDROCHLORIDE IN 0.9% SODIUM CHLORIDE 4 UG/ML
0.2 INJECTION INTRAVENOUS
Qty: 200 | Refills: 0 | Status: DISCONTINUED | OUTPATIENT
Start: 2022-03-22 | End: 2022-03-23

## 2022-03-22 RX ORDER — CHLORHEXIDINE GLUCONATE 213 G/1000ML
15 SOLUTION TOPICAL ONCE
Refills: 0 | Status: COMPLETED | OUTPATIENT
Start: 2022-03-22 | End: 2022-03-23

## 2022-03-22 RX ORDER — NALOXONE HYDROCHLORIDE 4 MG/.1ML
0.2 SPRAY NASAL ONCE
Refills: 0 | Status: COMPLETED | OUTPATIENT
Start: 2022-03-22 | End: 2022-03-22

## 2022-03-22 RX ADMIN — ATROPA BELLADONNA AND OPIUM 1 SUPPOSITORY(S): 16.2; 6 SUPPOSITORY RECTAL at 06:49

## 2022-03-22 RX ADMIN — Medication 50 MILLIEQUIVALENT(S): at 01:35

## 2022-03-22 RX ADMIN — ATROPA BELLADONNA AND OPIUM 1 SUPPOSITORY(S): 16.2; 6 SUPPOSITORY RECTAL at 22:20

## 2022-03-22 RX ADMIN — PANTOPRAZOLE SODIUM 40 MILLIGRAM(S): 20 TABLET, DELAYED RELEASE ORAL at 07:09

## 2022-03-22 RX ADMIN — CHLORHEXIDINE GLUCONATE 1 APPLICATION(S): 213 SOLUTION TOPICAL at 21:31

## 2022-03-22 RX ADMIN — Medication 81 MILLIGRAM(S): at 12:42

## 2022-03-22 RX ADMIN — ATROPA BELLADONNA AND OPIUM 1 SUPPOSITORY(S): 16.2; 6 SUPPOSITORY RECTAL at 07:15

## 2022-03-22 RX ADMIN — INSULIN HUMAN 6 UNIT(S)/HR: 100 INJECTION, SOLUTION SUBCUTANEOUS at 22:20

## 2022-03-22 RX ADMIN — DEXMEDETOMIDINE HYDROCHLORIDE IN 0.9% SODIUM CHLORIDE 5.13 MICROGRAM(S)/KG/HR: 4 INJECTION INTRAVENOUS at 22:21

## 2022-03-22 RX ADMIN — ATROPA BELLADONNA AND OPIUM 1 SUPPOSITORY(S): 16.2; 6 SUPPOSITORY RECTAL at 22:50

## 2022-03-22 RX ADMIN — Medication 650 MILLIGRAM(S): at 06:50

## 2022-03-22 RX ADMIN — MILRINONE LACTATE 12.3 MICROGRAM(S)/KG/MIN: 1 INJECTION, SOLUTION INTRAVENOUS at 22:21

## 2022-03-22 RX ADMIN — CHLORHEXIDINE GLUCONATE 1 APPLICATION(S): 213 SOLUTION TOPICAL at 05:26

## 2022-03-22 RX ADMIN — Medication 650 MILLIGRAM(S): at 13:20

## 2022-03-22 RX ADMIN — CHLORHEXIDINE GLUCONATE 1 APPLICATION(S): 213 SOLUTION TOPICAL at 21:00

## 2022-03-22 RX ADMIN — Medication 650 MILLIGRAM(S): at 12:42

## 2022-03-22 RX ADMIN — NALOXONE HYDROCHLORIDE 0.2 MILLIGRAM(S): 4 SPRAY NASAL at 23:15

## 2022-03-22 RX ADMIN — Medication 50 MILLIEQUIVALENT(S): at 02:00

## 2022-03-22 RX ADMIN — Medication 50 MILLIEQUIVALENT(S): at 06:00

## 2022-03-22 RX ADMIN — SODIUM CHLORIDE 3 MILLILITER(S): 9 INJECTION INTRAMUSCULAR; INTRAVENOUS; SUBCUTANEOUS at 07:09

## 2022-03-22 RX ADMIN — Medication 50 MILLIEQUIVALENT(S): at 17:46

## 2022-03-22 RX ADMIN — Medication 650 MILLIGRAM(S): at 07:15

## 2022-03-22 RX ADMIN — Medication 3.08 MICROGRAM(S)/KG/MIN: at 22:20

## 2022-03-22 RX ADMIN — Medication 10 MILLIGRAM(S): at 13:26

## 2022-03-22 RX ADMIN — Medication 50 MILLIEQUIVALENT(S): at 05:26

## 2022-03-22 RX ADMIN — Medication 25 GRAM(S): at 02:00

## 2022-03-22 RX ADMIN — ATORVASTATIN CALCIUM 80 MILLIGRAM(S): 80 TABLET, FILM COATED ORAL at 23:43

## 2022-03-22 RX ADMIN — NALOXONE HYDROCHLORIDE 0.4 MILLIGRAM(S): 4 SPRAY NASAL at 23:30

## 2022-03-22 RX ADMIN — SENNA PLUS 2 TABLET(S): 8.6 TABLET ORAL at 23:44

## 2022-03-22 NOTE — PROGRESS NOTE ADULT - SUBJECTIVE AND OBJECTIVE BOX
NATALIA TENA  MRN-[id number]  Patient is a 66y old  Male who presents with a chief complaint of Chest pressure/pain (21 Mar 2022 13:16)    HPI:  65 y/o Male with no PMH presented to [place] complaining of chest heaviness/pain since last night, associated with diaphoresis and emesis. Patient took 324 ASA this morning. EKG revealed new LBBB. Patient was transferred to [facility], Brilinta loaded and cardiac cath performed for NSTEMI which demonstrated severe multivessel CAD. Patient was in cardiogenic shock, and IABP was placed and Dobutamine drip was started. TTE demonstrated EF of 20-25%, RV size normal but decreased function. No LV thrombus. Patient was transferred and admitted to CTU for further management under Dr. Abad. (19 Mar 2022 15:35)      Surgery/Hospital course:  3/19 Chest pain/ pressure from last night,  this AM, Brilinta loaded, NSTEMI w/ new LBB, Cath: MVD, cardiogenic shock, IABP placed on . TTE: EF 20-25%, severe global systolic dysfnx, RV size normal with decreased fxn, no LV thrombus      Today/Overnight:  - WBC rising continue to monitor   - continue pressor support as tolerated     Vital Signs Last 24 Hrs  T(C): 37.8 (21 Mar 2022 17:00), Max: 38.2 (20 Mar 2022 20:00)  T(F): 100 (21 Mar 2022 17:00), Max: 100.8 (20 Mar 2022 20:00)  HR: 114 (21 Mar 2022 19:15) (90 - 120)  BP: --  BP(mean): --  RR: 28 (21 Mar 2022 19:15) (17 - 52)  SpO2: 98% (21 Mar 2022 19:15) (89% - 100%)    Physical Exam:  Gen: No acute distress, well- developed   Head: Atraumatic, Normocephalic   Eyes: EOMI, PERRLA, conjunctiva and sclera clear   Neck: Supple, no lymphadenopathy, no JVD   Pulmonary: CTAB, No wheezes, rales or rhonchi  CVS: Regular rate and rhythm. Normal S1/S2. no murmurs, rubs, or gallops, adventitious lung sounds       Abdomen: Soft, non- tender, non- distended, normal bowel sounds on nursing assessment no organomegaly  Extremities: Intact, 2+ peripheral pulses b/l, no clubbing, cyanosis, or edema   Neurology: A&O x3, non focal deficits   Skin: No rashes or lesions, warm, capillary refill < 3 seconds, no cyanosis     ============================I/O===========================  I&O's Detail    20 Mar 2022 07:  -  21 Mar 2022 07:00  --------------------------------------------------------  IN:    DOBUTamine: 1.5 mL    DOBUTamine: 13.8 mL    Furosemide: 25 mL    Furosemide: 30 mL    Furosemide: 2.5 mL    Furosemide: 17.5 mL    Furosemide: 20 mL    Heparin: 400 mL    Insulin: 161 mL    IV PiggyBack: 450 mL    Milrinone: 86.8 mL    Milrinone: 232.5 mL    Modular Fluid: 50 mL    Vasopressin: 40 mL  Total IN: 1530.6 mL    OUT:    Voided (mL): 3545 mL  Total OUT: 3545 mL    Total NET: -2014.4 mL      21 Mar 2022 07:  -  21 Mar 2022 19:47  --------------------------------------------------------  IN:    DOBUTamine: 50.2 mL    Furosemide: 40 mL    Furosemide: 45 mL    Heparin: 221.5 mL    Insulin: 81 mL    IV PiggyBack: 450 mL    Milrinone: 161.2 mL  Total IN: 1048.9 mL    OUT:    Vasopressin: 0 mL    Voided (mL): 2875 mL  Total OUT: 2875 mL    Total NET: -1826.1 mL        ============================ LABS =========================                        11.0   21.40 )-----------( 184      ( 21 Mar 2022 01:05 )             33.5     03-21    125<L>  |  89<L>  |  14  ----------------------------<  167<H>  3.6   |  24  |  0.70    Ca    8.2<L>      21 Mar 2022 01:05  Phos  2.5     -  Mg     1.8     -    TPro  6.2  /  Alb  3.6  /  TBili  0.6  /  DBili  x   /  AST  135<H>  /  ALT  53<H>  /  AlkPhos  65  -21    LIVER FUNCTIONS - ( 21 Mar 2022 01:05 )  Alb: 3.6 g/dL / Pro: 6.2 g/dL / ALK PHOS: 65 U/L / ALT: 53 U/L / AST: 135 U/L / GGT: x           PT/INR - ( 21 Mar 2022 02:05 )   PT: 19.0 sec;   INR: 1.64 ratio         PTT - ( 21 Mar 2022 17:57 )  PTT:62.2 sec  ABG - ( 21 Mar 2022 18:45 )  pH, Arterial: 7.48  pH, Blood: x     /  pCO2: 39    /  pO2: 110   / HCO3: 29    / Base Excess: 5.1   /  SaO2: 98.4              Urinalysis Basic - ( 20 Mar 2022 01:08 )    Color: Red / Appearance: Slightly Turbid / S.010 / pH: x  Gluc: x / Ketone: Negative  / Bili: Negative / Urobili: Negative   Blood: x / Protein: 30 mg/dL / Nitrite: Negative   Leuk Esterase: Negative / RBC: >50 /hpf / WBC 2 /HPF   Sq Epi: x / Non Sq Epi: 3 / Bacteria: Negative      ======================Micro/Rad/Cardio=================  Culture: Reviewed   CXR: Reviewed  Echo: Reviewed  ======================================================  PAST MEDICAL & SURGICAL HISTORY:  No pertinent past medical history    No significant past surgical history      ========================ASSESSMENT ================  NSTEMI   Multivessel CAD   Complicated with cardiogenic shock   S/P IABP placement for severe LV Systolic failure  Lactic acidosis  Hyperglycemia     Plan:  ====================== NEUROLOGY=====================  Continue close monitoring of neuro status   Tylenol PRN for fevers     acetaminophen     Tablet .. 650 milliGRAM(s) Oral every 6 hours PRN Temp greater or equal to 38.5C (101.3F), Moderate Pain (4 - 6)    ==================== RESPIRATORY======================  Supplemental O2 via 6L NC  Encourage incentive spirometry, continue pulse ox monitoring, follow ABGs     ====================CARDIOVASCULAR==================  NSTEMI, multivessle CAD S/P IABP placement for severe LV Systolic failure  Continue invasive hemodynamic monitoring   Lactate 1.2, continue trending   IABP 1:1 with good augmentation   Inotropic support with IV dobutamine and IV primacor   Continue pressor support with IV vasopressin   TTE: EF 20-25%, severe global systolic dysfnx, RV size normal with decreased fxn, no LV thrombus  ASA/ Lipitor for CAD    DOBUTamine Infusion 1.5 MICROgram(s)/kG/Min (4.61 mL/Hr) IV Continuous <Continuous>  aspirin enteric coated 81 milliGRAM(s) Oral daily  milrinone Infusion 0.3 MICROgram(s)/kG/Min (9.23 mL/Hr) IV Continuous <Continuous>  vasopressin Infusion 0.033 Unit(s)/Min (2 mL/Hr) IV Continuous <Continuous>  atorvastatin 80 milliGRAM(s) Oral at bedtime    ===================HEMATOLOGIC/ONC ===================  Monitor H&H/Plts     VTE prophylaxis, heparin  Infusion 1500 Unit(s)/Hr (18 mL/Hr) IV Continuous <Continuous>    ===================== RENAL =========================  Acidosis  Continue monitoring I&Os, BUN/Cr, and urine output   Replete lytes PRN. Keep K> 4 and Mg >2   Continue to diurese with IV Lasix     furosemide Infusion 10 mG/Hr (5 mL/Hr) IV Continuous <Continuous>  ==================== GASTROINTESTINAL===================  Tolerating a consistent carb diet   Bowel regimen with Senna     GI prophylaxis, pantoprazole    Tablet 40 milliGRAM(s) Oral before breakfast  senna 2 Tablet(s) Oral at bedtime  sodium chloride 0.9% lock flush 3 milliLiter(s) IV Push every 8 hours    =======================    ENDOCRINE  =====================  Stress hyperglycemia, continue glucose control with insulin gtt     dextrose 50% Injectable 50 milliLiter(s) IV Push every 15 minutes  dextrose 50% Injectable 25 milliLiter(s) IV Push every 15 minutes  insulin regular Infusion 6 Unit(s)/Hr (6 mL/Hr) IV Continuous <Continuous>    ========================INFECTIOUS DISEASE================  Temp 100F, WBC rising 18.48 -> 21.40   Continue trending WBC and monitoring fever curve     Patient requires continuous monitoring with bedside rhythm monitoring, pulse oximetry monitoring, and continuous central venous and arterial pressure monitoring; and intermittent blood gas analysis.  Care plan discussed with ICU care team.    Patient remained critical, at risk for life threatening decompensation.   I have spent 35 minutes providing acute care with multiple re-evaluations throughout the evening.          MALLIKA NATALIA  MRN-[id number]  Patient is a 66y old  Male who presents with a chief complaint of Chest pressure/pain (21 Mar 2022 13:16)    HPI:  67 y/o Male with no PMH presented to [place] complaining of chest heaviness/pain since last night, associated with diaphoresis and emesis. Patient took 324 ASA this morning. EKG revealed new LBBB. Patient was transferred to [facility], Brilinta loaded and cardiac cath performed for NSTEMI which demonstrated severe multivessel CAD. Patient was in cardiogenic shock, and IABP was placed and Dobutamine drip was started. TTE demonstrated EF of 20-25%, RV size normal but decreased function. No LV thrombus. Patient was transferred and admitted to CTU for further management under Dr. Abad. (19 Mar 2022 15:35)      Surgery/Hospital course:  3/19 Chest pain/ pressure from last night,  this AM, Brilinta loaded, NSTEMI w/ new LBB, Cath: MVD, cardiogenic shock, IABP placed on . TTE: EF 20-25%, severe global systolic dysfnx, RV size normal with decreased fxn, no LV thrombus      Today/Overnight:  - Remains on primacor and  for inotropic support in addition to IABP  -Heparin gtt for AC  -Insulin gtt for glycemic control  -Lasix gtt weaned off   -Plan for OR in AM    ICU Vital Signs Last 24 Hrs  T(C): 37.8 (22 Mar 2022 23:00), Max: 37.8 (22 Mar 2022 23:00)  T(F): 100 (22 Mar 2022 23:00), Max: 100 (22 Mar 2022 23:00)  HR: 102 (23 Mar 2022 02:00) (94 - 117)  BP: --  BP(mean): --  ABP: 109/54 (23 Mar 2022 02:00) (97/48 - 146/58)  ABP(mean): 78 (23 Mar 2022 02:00) (71 - 92)  RR: 26 (23 Mar 2022 02:00) (21 - 37)  SpO2: 99% (23 Mar 2022 02:00) (88% - 100%)      Physical Exam:  Gen: No acute distress, well- developed   Head: Atraumatic, Normocephalic   Eyes: EOMI, PERRLA, conjunctiva and sclera clear   Neck: Supple, no lymphadenopathy, no JVD   Pulmonary: CTAB, No wheezes, rales or rhonchi  CVS: Regular rate and rhythm. Normal S1/S2. no murmurs, rubs, or gallops, adventitious lung sounds       Abdomen: Soft, non- tender, non- distended, normal bowel sounds on nursing assessment no organomegaly  Extremities: Intact, 2+ peripheral pulses b/l, no clubbing, cyanosis, or edema   Neurology: A&O x3, non focal deficits   Skin: No rashes or lesions, warm, capillary refill < 3 seconds, no cyanosis     ============================I/O===========================  I&O's Detail    21 Mar 2022 07:  -  22 Mar 2022 07:00  --------------------------------------------------------  IN:    DOBUTamine: 92 mL    Furosemide: 20 mL    Furosemide: 40 mL    Furosemide: 60 mL    Heparin: 419.5 mL    Insulin: 132 mL    IV PiggyBack: 650 mL    Milrinone: 297.6 mL  Total IN: 1711.1 mL    OUT:    Vasopressin: 0 mL    Voided (mL): 5175 mL  Total OUT: 5175 mL    Total NET: -3463.9 mL      22 Mar 2022 07:01  -  23 Mar 2022 02:24  --------------------------------------------------------  IN:    Dexmedetomidine: 25.7 mL    DOBUTamine: 68.4 mL    Heparin: 357 mL    Insulin: 114 mL    IV PiggyBack: 50 mL    Milrinone: 223.2 mL    Vasopressin: 2 mL  Total IN: 840.3 mL    OUT:    Voided (mL): 2310 mL  Total OUT: 2310 mL    Total NET: -1469.7 mL      ============================ LABS =========================                        ----------  LABORATORY DATA:  ----------                        10.9   13.54 )-----------( x        ( 23 Mar 2022 00:24 )             32.3               03    128<L>  |  92<L>  |  12  ----------------------------<  128<H>  4.3   |  25  |  0.62    Ca    8.6      23 Mar 2022 00:24  Phos  2.5       Mg     2.3         TPro  6.2  /  Alb  3.2<L>  /  TBili  0.4  /  DBili  x   /  AST  44<H>  /  ALT  43  /  AlkPhos  102  0323    LIVER FUNCTIONS - ( 23 Mar 2022 00:24 )  Alb: 3.2 g/dL / Pro: 6.2 g/dL / ALK PHOS: 102 U/L / ALT: 43 U/L / AST: 44 U/L / GGT: x           PT/INR - ( 22 Mar 2022 00:39 )   PT: 17.7 sec;   INR: 1.52 ratio         PTT - ( 22 Mar 2022 22:23 )  PTT:67.2 sec          ABG - ( 23 Mar 2022 00:14 )  pH, Arterial: 7.42  pH, Blood: x     /  pCO2: 44    /  pO2: 153   / HCO3: 28    / Base Excess: 3.5   /  SaO2: 99.1        ----------  RADIOGRAPHIC DATA:  ---------  PACS Image: Image(s) Available (22 @ 02:30)  PACS Image: Image(s) Available (22 @ 02:36)    ======================Micro/Rad/Cardio=================  Culture: Reviewed   CXR: Reviewed  Echo: Reviewed  ======================================================  PAST MEDICAL & SURGICAL HISTORY:  No pertinent past medical history    No significant past surgical history      ========================ASSESSMENT ================  NSTEMI   Multivessel CAD   Complicated with cardiogenic shock   S/P IABP placement for severe LV Systolic failure  Lactic acidosis  Hyperglycemia     Plan:  ====================== NEUROLOGY=====================  Continue close monitoring of neuro status   Tylenol PRN for fevers     acetaminophen     Tablet .. 650 milliGRAM(s) Oral every 6 hours PRN Temp greater or equal to 38.5C (101.3F), Moderate Pain (4 - 6)    ==================== RESPIRATORY======================  Supplemental O2 via 6L NC, wean as tolerated  Encourage incentive spirometry, continue pulse ox monitoring, follow ABGs     ====================CARDIOVASCULAR==================  NSTEMI, multivessle CAD S/P IABP placement for severe LV Systolic failure  Continue invasive hemodynamic monitoring   Lactate 1.2, continue trending   IABP 1:1 with good augmentation   Inotropic support with IV dobutamine and IV primacor   Continue pressor support with IV vasopressin, wean as tolerated  TTE: EF 20-25%, severe global systolic dysfnx, RV size normal with decreased fxn, no LV thrombus  ASA/ Lipitor for CAD    DOBUTamine Infusion 1.5 MICROgram(s)/kG/Min (4.61 mL/Hr) IV Continuous <Continuous>  aspirin enteric coated 81 milliGRAM(s) Oral daily  milrinone Infusion 0.3 MICROgram(s)/kG/Min (9.23 mL/Hr) IV Continuous <Continuous>  vasopressin Infusion 0.033 Unit(s)/Min (2 mL/Hr) IV Continuous <Continuous>  atorvastatin 80 milliGRAM(s) Oral at bedtime    ===================HEMATOLOGIC/ONC ===================  Monitor H&H/Plts     VTE prophylaxis, heparin  Infusion 1500 Unit(s)/Hr (18 mL/Hr) IV Continuous <Continuous>    ===================== RENAL =========================    Continue monitoring I&Os, BUN/Cr, and urine output   Replete lytes PRN. Keep K> 4 and Mg >2     ==================== GASTROINTESTINAL===================  Tolerating a consistent carb diet   Bowel regimen with Senna     GI prophylaxis, pantoprazole    Tablet 40 milliGRAM(s) Oral before breakfast  senna 2 Tablet(s) Oral at bedtime  sodium chloride 0.9% lock flush 3 milliLiter(s) IV Push every 8 hours    =======================    ENDOCRINE  =====================  Stress hyperglycemia, continue glucose control with insulin gtt     dextrose 50% Injectable 50 milliLiter(s) IV Push every 15 minutes  dextrose 50% Injectable 25 milliLiter(s) IV Push every 15 minutes  insulin regular Infusion 6 Unit(s)/Hr (6 mL/Hr) IV Continuous <Continuous>    ========================INFECTIOUS DISEASE================  Continue trending WBC and monitoring fever curve     Patient requires continuous monitoring with bedside rhythm monitoring, pulse oximetry monitoring, and continuous central venous and arterial pressure monitoring; and intermittent blood gas analysis.  Care plan discussed with ICU care team.    Patient remained critical, at risk for life threatening decompensation.   I have spent 35 minutes providing acute care with multiple re-evaluations throughout the evening.

## 2022-03-22 NOTE — PROGRESS NOTE ADULT - SUBJECTIVE AND OBJECTIVE BOX
Patient seen and examined at the bedside.    Remained critically ill on continuous ICU monitoring.    OBJECTIVE:  Vital Signs Last 24 Hrs  T(C): 37.4 (22 Mar 2022 08:00), Max: 38 (21 Mar 2022 20:00)  T(F): 99.3 (22 Mar 2022 08:00), Max: 100.4 (21 Mar 2022 20:00)  HR: 102 (22 Mar 2022 08:00) (100 - 120)  BP: --  BP(mean): --  RR: 21 (22 Mar 2022 08:00) (17 - 47)  SpO2: 99% (22 Mar 2022 08:00) (95% - 100%)      Physical Exam:   General: NAD   Neurology: nonfocal   Eyes: bilateral pupils equal and reactive   ENT/Neck: Neck supple, trachea midline, No JVD   Respiratory: Rales noted bilaterally   CV: Sinus tach, S1S2  Abdominal: Soft, NT, ND +BS,   Extremities: no edema, + peripheral pulses, R femoral IABP   Skin: No Rashes, Hematoma, Ecchymosis                                          Assessment:  65 y/o Male with no PMHX     Admitted STEMI w/ new LBBB /  Multivessel CAD / complicated with cardiogenic shock   S/P IABP placement for severe LV Systolic failure on 3/19   Pulmonary edema   Stress hyperglycemia     Plan:   ***Neuro***  [x] Nonfocal     Continue routine neuro assessment     ***Cardiovascular***  Invasive hemodynamic monitoring, assess perfusion indices   Sinus tach / CVP 3 / PAP 18 / MAP 71 /  CI 3.3 /Hct 36.0% / Lactate 1.2   [x] Primacor 0.4mcg [x] Dobutamine 1mcg [x] Vasopressin dc'd this AM   [x] IABP with good diastolic augmentation at 1:1   Monitor RLE Perfusion & any bleeding complication  [x] AC therapy with heparin gtt, PTT 70-99  [x] ASA [x] Statin   Serial EKG and cardiac enzymes     ***Pulmonary***  [x] HFO2 40L/60%   Encourage incentive spirometry, continue pulse ox monitoring, follow ABGs     ***GI***  [x] Diet: Consistent carb diet / NPO after MN   [x] Protonix  Bowel regimen with Senna       ***Renal***  Continue to monitor I/Os, BUN/Creatinine.   Replete lytes PRN  Jovel present [x]  negative   Diuresis with IV Lasix  - weaned to off this AM     ***ID***  No active antibiotic coverage      ***Endocrine***  [x] Stress Hyperglycemia : HbA1c 11.4%               - [x] Insulin gtt               - Need tight glycemic control to prevent wound infection.        Patient requires continuous monitoring with bedside rhythm monitoring, pulse oximetry monitoring, and continuous central venous and arterial pressure monitoring; and intermittent blood gas analysis. Care plan discussed with the ICU care team.   Patient remained critical, at risk for life threatening decompensation.    I have spent 30 minutes providing critical care management to this patient.    By signing my name below, I, Cheryl Jackson, attest that this documentation has been prepared under the direction and in the presence of Jeffrey Fang MD   Electronically signed: Evan Wilks, 03-22-22 @ 09:11    I, Jeffrey Fang, personally performed the services described in this documentation. all medical record entries made by the scribe were at my direction and in my presence. I have reviewed the chart and agree that the record reflects my personal performance and is accurate and complete  Electronically signed: Jeffrey Fang MD  Patient seen and examined at the bedside.    Remained critically ill on continuous ICU monitoring.    OBJECTIVE:  Vital Signs Last 24 Hrs  T(C): 37.4 (22 Mar 2022 08:00), Max: 38 (21 Mar 2022 20:00)  T(F): 99.3 (22 Mar 2022 08:00), Max: 100.4 (21 Mar 2022 20:00)  HR: 102 (22 Mar 2022 08:00) (100 - 120)  BP: --  BP(mean): --  RR: 21 (22 Mar 2022 08:00) (17 - 47)  SpO2: 99% (22 Mar 2022 08:00) (95% - 100%)      Physical Exam:   General: NAD   Neurology: nonfocal   Eyes: bilateral pupils equal and reactive   ENT/Neck: Neck supple, trachea midline, No JVD   Respiratory: Rales noted bilaterally   CV: Sinus tach, S1S2  Abdominal: Soft, NT, ND +BS,   Extremities: no edema, + peripheral pulses, R femoral IABP   Skin: No Rashes, Hematoma, Ecchymosis                                          Assessment:  65 y/o Male with no PMHX     Admitted STEMI w/ new LBBB /  Multivessel CAD / post infarct cardiogenic shock   S/P IABP placement for severe LV Systolic failure on 3/19   Pulmonary edema   Stress hyperglycemia     Plan:   ***Neuro***  [x] Nonfocal     Continue routine neuro assessment     ***Cardiovascular***  Invasive hemodynamic monitoring, assess perfusion indices   Sinus tach / CVP 3 / PAP 18 / MAP 71 /  CI 3.3 /Hct 36.0% / Lactate 1.2   [x] Primacor 0.4mcg [x] Dobutamine 1mcg [x] Vasopressin dc'd this AM   [x] IABP with good diastolic augmentation at 1:1   Monitor RLE Perfusion & any bleeding complication  Pt with adequate MV, CI, and good peripheral perfusion   [x] AC therapy with heparin gtt, PTT 70-99  [x] ASA [x] Statin   Serial EKG and cardiac enzymes   P2Y12 adequate range for revascularization     ***Pulmonary***  [x] HFO2 40L/60%   Encourage incentive spirometry, continue pulse ox monitoring, follow ABGs     ***GI***  [x] Diet: Consistent carb diet / NPO after MN   [x] Protonix  Bowel regimen with Senna       ***Renal***  Continue to monitor I/Os, BUN/Creatinine.   Replete lytes PRN  Med present [x]  negative   Lasix drip dc'd today 2/2 euvolemia     ***ID***  No active antibiotic coverage      ***Endocrine***  [x] Stress Hyperglycemia : HbA1c 11.4%               - [x] Insulin gtt               - Need tight glycemic control to prevent wound infection.        Patient requires continuous monitoring with bedside rhythm monitoring, pulse oximetry monitoring, and continuous central venous and arterial pressure monitoring; and intermittent blood gas analysis. Care plan discussed with the ICU care team.   Patient remained critical, at risk for life threatening decompensation.    I have spent 75 minutes providing critical care management to this patient.    By signing my name below, I, Cheryl Jackson, attest that this documentation has been prepared under the direction and in the presence of Jeffrey Fang MD   Electronically signed: Evan Wilks, 03-22-22 @ 09:11    I, Jeffrey Fang, personally performed the services described in this documentation. all medical record entries made by the scribe were at my direction and in my presence. I have reviewed the chart and agree that the record reflects my personal performance and is accurate and complete  Electronically signed: Jeffrey Fang MD

## 2022-03-22 NOTE — PROGRESS NOTE ADULT - SUBJECTIVE AND OBJECTIVE BOX
Patient seen and examined at bedside.    Overnight Events: no events, issues or complaints    Review of Systems:  REVIEW OF SYSTEMS:  CONSTITUTIONAL: No weakness, fevers or chills  EYES/ENT: No visual changes;  No dysphagia  NECK: No pain or stiffness  RESPIRATORY: No cough, wheezing, hemoptysis; No shortness of breath  CARDIOVASCULAR: No chest pain or palpitations; No lower extremity edema  GASTROINTESTINAL: No abdominal or epigastric pain. No nausea, vomiting, or hematemesis; No diarrhea or constipation. No melena or hematochezia.  BACK: No back pain  GENITOURINARY: No dysuria, frequency or hematuria  NEUROLOGICAL: No numbness or weakness  SKIN: No itching, burning, rashes, or lesions   All other review of systems is negative unless indicated above.    [x] All other systems negative  [ ] Unable to assess ROS due to    Current Meds:  acetaminophen     Tablet .. 650 milliGRAM(s) Oral every 6 hours PRN  aspirin enteric coated 81 milliGRAM(s) Oral daily  atorvastatin 80 milliGRAM(s) Oral at bedtime  chlorhexidine 2% Cloths 1 Application(s) Topical <User Schedule>  chlorhexidine 4% Liquid 1 Application(s) Topical <User Schedule>  DOBUTamine Infusion 1 MICROgram(s)/kG/Min IV Continuous <Continuous>  heparin  Infusion 1500 Unit(s)/Hr IV Continuous <Continuous>  insulin regular Infusion 6 Unit(s)/Hr IV Continuous <Continuous>  lidocaine   4% Patch 1 Patch Transdermal every 24 hours  magnesium sulfate  IVPB 2 Gram(s) IV Intermittent daily  milrinone Infusion 0.4 MICROgram(s)/kG/Min IV Continuous <Continuous>  pantoprazole    Tablet 40 milliGRAM(s) Oral before breakfast  senna 2 Tablet(s) Oral at bedtime      PAST MEDICAL & SURGICAL HISTORY:  No pertinent past medical history    No significant past surgical history        Vitals:  T(F): 99.9 (03-22), Max: 100.4 (03-21)  HR: 107 (03-22) (100 - 120)  BP: --  RR: 32 (03-22)  SpO2: 98% (03-22)  I&O's Summary    21 Mar 2022 07:01  -  22 Mar 2022 07:00  --------------------------------------------------------  IN: 1711.1 mL / OUT: 5175 mL / NET: -3463.9 mL    22 Mar 2022 07:01  -  22 Mar 2022 12:23  --------------------------------------------------------  IN: 229.2 mL / OUT: 535 mL / NET: -305.8 mL        Physical Exam:  General Appearance: well appearing, normal for age and gender. 	  Neck: normal JVP, no bruit.   Cardiovascular: regular rate and rhythm S1 S2, No JVD, No murmurs, No edema  Respiratory: Lungs clear to auscultation	  Psychiatry: Alert and oriented x 3, Mood & affect appropriate  Gastrointestinal:  Soft, Non-tender  Skin/Integumen: No rashes, No ecchymoses, No cyanosis	  Neurologic: Non-focal  Musculoskeletal/extremities: Normal range of motion, No clubbing, cyanosis or edema  Vascular: Peripheral pulses palpable 2+ bilaterally; IABP access site c/d/i                            10.9   15.22 )-----------( 223      ( 22 Mar 2022 00:39 )             32.6     03-22    128<L>  |  89<L>  |  11  ----------------------------<  131<H>  3.9   |  26  |  0.70    Ca    8.5      22 Mar 2022 00:39  Phos  2.4     03-22  Mg     1.8     03-22    TPro  6.2  /  Alb  3.4  /  TBili  0.6  /  DBili  x   /  AST  62<H>  /  ALT  47<H>  /  AlkPhos  80  03-22    PT/INR - ( 22 Mar 2022 00:39 )   PT: 17.7 sec;   INR: 1.52 ratio         PTT - ( 22 Mar 2022 11:25 )  PTT:57.9 sec  CARDIAC MARKERS ( 22 Mar 2022 00:39 )  x     / x     / 291 U/L / x     / 4.6 ng/mL  CARDIAC MARKERS ( 21 Mar 2022 01:05 )  x     / x     / 870 U/L / x     / 14.9 ng/mL      Serum Pro-Brain Natriuretic Peptide: 7087 pg/mL (03-20 @ 02:54)  Serum Pro-Brain Natriuretic Peptide: 6715 pg/mL (03-19 @ 17:08)

## 2022-03-22 NOTE — PROGRESS NOTE ADULT - ASSESSMENT
65 y/o Male with no PMH presented to [place] complaining of chest heaviness/pain since last night, associated with diaphoresis and emesis. Patient took 324 ASA this morning. EKG revealed new LBBB. Patient was transferred to [facility] and cardiac cath performed for NSTEMI which demonstrated severe multivessel CAD. Patient was in cardiogenic shock, and IABP was placed and Dobutamine drip was started. TTE demonstrated EF of 20-25%, RV size normal but decreased function. No LV thrombus. s/p IABP. Patient was transferred and admitted to CTU for further management.    BNP 7087, Tn 3580   Cardiac studies:   LHC 3/19/2022: Multivessel CAD  Echo 3/19/2022: EF 20-25%, no LV thrombus, minimal MR, RV normal size with decreased function.     PAC 3/20/2022 AM: CO/CI 5.7/2.5; , PA 32/15/22; CVP 5; MAP 70;   PAC 3/22/2022 AM: CO/CI 7.5/3.3; , PA 46/20/32, CVP 7, Wedge 22, PA Sat 73%, Aug , MAP 71,  on IABP 1:1,  1, Milrinone 0.4   65 y/o Male with no PMH presented to [place] complaining of chest heaviness/pain since last night, associated with diaphoresis and emesis. EKG revealed new LBBB. Patient was transferred to [facility] and cardiac cath performed for NSTEMI which demonstrated severe multivessel CAD. Patient was in cardiogenic shock, and IABP was placed and Dobutamine drip was started. TTE demonstrated EF of 20-25%, RV size normal but decreased function. No LV thrombus. s/p IABP. Patient was transferred and admitted to CTU for further management.    BNP 7087, Tn 3580   Cardiac studies:   LHC 3/19/2022: Multivessel CAD  Echo 3/19/2022: EF 20-25%, no LV thrombus, minimal MR, RV normal size with decreased function.     PAC 3/20/2022 AM: CO/CI 5.7/2.5; , PA 32/15/22; CVP 5; MAP 70;   PAC 3/22/2022 AM: CO/CI 7.5/3.3; , PA 46/20/32, CVP 7, Wedge 22, PA Sat 73%, Aug , MAP 71,  on IABP 1:1,  1, Milrinone 0.4

## 2022-03-22 NOTE — PROGRESS NOTE ADULT - PROBLEM SELECTOR PLAN 2
- ACS: new LBBB+elevated cardiac biomarkers  - multivessel CAD  - continue ASA 81 mg daily  - continue lipitor 80 mg daily  - CT sx for surgical revascularization; likely CABG tomorrow

## 2022-03-22 NOTE — PROGRESS NOTE ADULT - ATTENDING COMMENTS
Excellent response to aggressive diuresis.   CxR is much improved as well as hemodynamics.  Pt remains chest pain free.   We discussed with the patient and his family his current critical condition in setting of cardiogenic shock requring inotropic and tMCS support. I explained best case and worst case scenario including decompensation perioperatively. We discussed LVAd as bail out option which is not ideal given his risk profile. Patient is very anxious and he avoided any conversation regarding implant. I asked our VAD coordinators to discuss in detail, pt still refused.   Continue supportive care. PLan for CABg tomorrow, we will follow perioperatively.

## 2022-03-22 NOTE — PROGRESS NOTE ADULT - PROBLEM SELECTOR PLAN 3
- In setting of ischemic cardiomyopathy LVEF 35% LVIDd 5.1 cm; see above  - GDMT on hold due to cardiogenic shock

## 2022-03-22 NOTE — PROGRESS NOTE ADULT - PROBLEM SELECTOR PLAN 1
- Intermacs 2/SCAI stage C  - Continue hemodynamic monitoring with PA catheter  - tMCS: s/p IABP, on 1:1. Hep gtt for AC.   - Inotropes: milrinone gtt @ 0.4 mcg/kg/min and dobutamine 1 mcg/kg/min; wean dobutamine as able  - Monitor perfusion labs daily: lactic acid, LFTs, SVo2  - Goals: CVP < 10, pcwp <15, CI > 2.2, UO > 50cc/h, lactate < 2.2   - Will likely need tMCS perioperatively.  - off lasix given UOP of 5L - Intermacs 2/SCAI stage C  - Continue hemodynamic monitoring with PA catheter  - tMCS: s/p IABP, on 1:1. Hep gtt for AC.   - Inotropes: milrinone gtt @ 0.4 mcg/kg/min and dobutamine 1 mcg/kg/min; wean dobutamine as able  - Monitor perfusion labs daily: lactic acid, LFTs, SVo2  - Goals: CVP < 10, pcwp <15, CI > 2.2, UO > 50cc/h, lactate < 2.2   - Will likely need tMCS perioperatively.  - off lasix given UOP of 5L  - Not a candidate for heart tx due to uncontrolled DM. Can consider LVAD if needed. Pt is very anxious and was not interested in discussing. We also asked our VAD coordinators to discuss and pt as well as his family refused.

## 2022-03-23 ENCOUNTER — APPOINTMENT (OUTPATIENT)
Dept: CARDIOTHORACIC SURGERY | Facility: HOSPITAL | Age: 67
End: 2022-03-23

## 2022-03-23 LAB
ALBUMIN SERPL ELPH-MCNC: 2.9 G/DL — LOW (ref 3.3–5)
ALBUMIN SERPL ELPH-MCNC: 3.2 G/DL — LOW (ref 3.3–5)
ALP SERPL-CCNC: 102 U/L — SIGNIFICANT CHANGE UP (ref 40–120)
ALP SERPL-CCNC: 64 U/L — SIGNIFICANT CHANGE UP (ref 40–120)
ALT FLD-CCNC: 25 U/L — SIGNIFICANT CHANGE UP (ref 10–45)
ALT FLD-CCNC: 43 U/L — SIGNIFICANT CHANGE UP (ref 10–45)
ANION GAP SERPL CALC-SCNC: 11 MMOL/L — SIGNIFICANT CHANGE UP (ref 5–17)
ANION GAP SERPL CALC-SCNC: 12 MMOL/L — SIGNIFICANT CHANGE UP (ref 5–17)
APTT BLD: 32.7 SEC — SIGNIFICANT CHANGE UP (ref 27.5–35.5)
APTT BLD: 86.4 SEC — HIGH (ref 27.5–35.5)
AST SERPL-CCNC: 44 U/L — HIGH (ref 10–40)
AST SERPL-CCNC: 54 U/L — HIGH (ref 10–40)
BASE EXCESS BLDMV CALC-SCNC: 0.1 MMOL/L — SIGNIFICANT CHANGE UP (ref -3–3)
BASE EXCESS BLDMV CALC-SCNC: 1.3 MMOL/L — SIGNIFICANT CHANGE UP (ref -3–3)
BASE EXCESS BLDMV CALC-SCNC: 2.3 MMOL/L — SIGNIFICANT CHANGE UP (ref -3–3)
BASE EXCESS BLDMV CALC-SCNC: 3 MMOL/L — SIGNIFICANT CHANGE UP (ref -3–3)
BASE EXCESS BLDMV CALC-SCNC: 4.7 MMOL/L — HIGH (ref -3–3)
BASE EXCESS BLDV CALC-SCNC: -1.1 MMOL/L — SIGNIFICANT CHANGE UP (ref -2–2)
BASE EXCESS BLDV CALC-SCNC: 1.2 MMOL/L — SIGNIFICANT CHANGE UP (ref -2–2)
BASE EXCESS BLDV CALC-SCNC: 1.8 MMOL/L — SIGNIFICANT CHANGE UP (ref -2–2)
BASOPHILS # BLD AUTO: 0 K/UL — SIGNIFICANT CHANGE UP (ref 0–0.2)
BASOPHILS # BLD AUTO: 0.01 K/UL — SIGNIFICANT CHANGE UP (ref 0–0.2)
BASOPHILS # BLD AUTO: 0.04 K/UL — SIGNIFICANT CHANGE UP (ref 0–0.2)
BASOPHILS NFR BLD AUTO: 0 % — SIGNIFICANT CHANGE UP (ref 0–2)
BASOPHILS NFR BLD AUTO: 0.1 % — SIGNIFICANT CHANGE UP (ref 0–2)
BASOPHILS NFR BLD AUTO: 0.3 % — SIGNIFICANT CHANGE UP (ref 0–2)
BILIRUB SERPL-MCNC: 0.4 MG/DL — SIGNIFICANT CHANGE UP (ref 0.2–1.2)
BILIRUB SERPL-MCNC: 2 MG/DL — HIGH (ref 0.2–1.2)
BLOOD GAS VENOUS - CREATININE: SIGNIFICANT CHANGE UP MG/DL (ref 0.5–1.3)
BUN SERPL-MCNC: 10 MG/DL — SIGNIFICANT CHANGE UP (ref 7–23)
BUN SERPL-MCNC: 12 MG/DL — SIGNIFICANT CHANGE UP (ref 7–23)
CA-I SERPL-SCNC: 0.97 MMOL/L — LOW (ref 1.15–1.33)
CA-I SERPL-SCNC: 0.98 MMOL/L — LOW (ref 1.15–1.33)
CA-I SERPL-SCNC: 0.99 MMOL/L — LOW (ref 1.15–1.33)
CALCIUM SERPL-MCNC: 7.7 MG/DL — LOW (ref 8.4–10.5)
CALCIUM SERPL-MCNC: 8.6 MG/DL — SIGNIFICANT CHANGE UP (ref 8.4–10.5)
CHLORIDE BLDV-SCNC: 93 MMOL/L — LOW (ref 96–108)
CHLORIDE BLDV-SCNC: 96 MMOL/L — SIGNIFICANT CHANGE UP (ref 96–108)
CHLORIDE BLDV-SCNC: 97 MMOL/L — SIGNIFICANT CHANGE UP (ref 96–108)
CHLORIDE SERPL-SCNC: 102 MMOL/L — SIGNIFICANT CHANGE UP (ref 96–108)
CHLORIDE SERPL-SCNC: 92 MMOL/L — LOW (ref 96–108)
CK MB BLD-MCNC: 8.7 % — HIGH (ref 0–3.5)
CK MB CFR SERPL CALC: 25 NG/ML — HIGH (ref 0–6.7)
CK SERPL-CCNC: 286 U/L — HIGH (ref 30–200)
CLOSURE TME COLL+EPINEP BLD: 238 K/UL — SIGNIFICANT CHANGE UP (ref 150–400)
CLOSURE TME COLL+EPINEP BLD: SIGNIFICANT CHANGE UP (ref 150–400)
CO2 BLDMV-SCNC: 29 MMOL/L — SIGNIFICANT CHANGE UP (ref 21–29)
CO2 BLDMV-SCNC: 30 MMOL/L — HIGH (ref 21–29)
CO2 BLDMV-SCNC: 30 MMOL/L — HIGH (ref 21–29)
CO2 BLDMV-SCNC: 31 MMOL/L — HIGH (ref 21–29)
CO2 BLDMV-SCNC: 32 MMOL/L — HIGH (ref 21–29)
CO2 BLDV-SCNC: 27 MMOL/L — HIGH (ref 22–26)
CO2 BLDV-SCNC: 29 MMOL/L — HIGH (ref 22–26)
CO2 BLDV-SCNC: 29 MMOL/L — HIGH (ref 22–26)
CO2 SERPL-SCNC: 25 MMOL/L — SIGNIFICANT CHANGE UP (ref 22–31)
CO2 SERPL-SCNC: 26 MMOL/L — SIGNIFICANT CHANGE UP (ref 22–31)
CREAT SERPL-MCNC: 0.43 MG/DL — LOW (ref 0.5–1.3)
CREAT SERPL-MCNC: 0.62 MG/DL — SIGNIFICANT CHANGE UP (ref 0.5–1.3)
EGFR: 105 ML/MIN/1.73M2 — SIGNIFICANT CHANGE UP
EGFR: 118 ML/MIN/1.73M2 — SIGNIFICANT CHANGE UP
EOSINOPHIL # BLD AUTO: 0 K/UL — SIGNIFICANT CHANGE UP (ref 0–0.5)
EOSINOPHIL # BLD AUTO: 0.04 K/UL — SIGNIFICANT CHANGE UP (ref 0–0.5)
EOSINOPHIL # BLD AUTO: 0.18 K/UL — SIGNIFICANT CHANGE UP (ref 0–0.5)
EOSINOPHIL NFR BLD AUTO: 0 % — SIGNIFICANT CHANGE UP (ref 0–6)
EOSINOPHIL NFR BLD AUTO: 0.3 % — SIGNIFICANT CHANGE UP (ref 0–6)
EOSINOPHIL NFR BLD AUTO: 1.9 % — SIGNIFICANT CHANGE UP (ref 0–6)
FIBRINOGEN PPP-MCNC: 553 MG/DL — HIGH (ref 330–520)
GAS PNL BLDA: SIGNIFICANT CHANGE UP
GAS PNL BLDMV: SIGNIFICANT CHANGE UP
GAS PNL BLDV: 130 MMOL/L — LOW (ref 136–145)
GAS PNL BLDV: 130 MMOL/L — LOW (ref 136–145)
GAS PNL BLDV: 131 MMOL/L — LOW (ref 136–145)
GAS PNL BLDV: SIGNIFICANT CHANGE UP
GLUCOSE BLDC GLUCOMTR-MCNC: 123 MG/DL — HIGH (ref 70–99)
GLUCOSE BLDC GLUCOMTR-MCNC: 131 MG/DL — HIGH (ref 70–99)
GLUCOSE BLDC GLUCOMTR-MCNC: 133 MG/DL — HIGH (ref 70–99)
GLUCOSE BLDC GLUCOMTR-MCNC: 135 MG/DL — HIGH (ref 70–99)
GLUCOSE BLDC GLUCOMTR-MCNC: 140 MG/DL — HIGH (ref 70–99)
GLUCOSE BLDC GLUCOMTR-MCNC: 142 MG/DL — HIGH (ref 70–99)
GLUCOSE BLDC GLUCOMTR-MCNC: 146 MG/DL — HIGH (ref 70–99)
GLUCOSE BLDC GLUCOMTR-MCNC: 159 MG/DL — HIGH (ref 70–99)
GLUCOSE BLDC GLUCOMTR-MCNC: 160 MG/DL — HIGH (ref 70–99)
GLUCOSE BLDC GLUCOMTR-MCNC: 169 MG/DL — HIGH (ref 70–99)
GLUCOSE BLDC GLUCOMTR-MCNC: 174 MG/DL — HIGH (ref 70–99)
GLUCOSE BLDC GLUCOMTR-MCNC: 178 MG/DL — HIGH (ref 70–99)
GLUCOSE BLDC GLUCOMTR-MCNC: 180 MG/DL — HIGH (ref 70–99)
GLUCOSE BLDC GLUCOMTR-MCNC: 186 MG/DL — HIGH (ref 70–99)
GLUCOSE BLDC GLUCOMTR-MCNC: 187 MG/DL — HIGH (ref 70–99)
GLUCOSE BLDV-MCNC: 151 MG/DL — HIGH (ref 70–99)
GLUCOSE BLDV-MCNC: 153 MG/DL — HIGH (ref 70–99)
GLUCOSE BLDV-MCNC: 156 MG/DL — HIGH (ref 70–99)
GLUCOSE SERPL-MCNC: 128 MG/DL — HIGH (ref 70–99)
GLUCOSE SERPL-MCNC: 179 MG/DL — HIGH (ref 70–99)
HCO3 BLDMV-SCNC: 27 MMOL/L — SIGNIFICANT CHANGE UP (ref 20–28)
HCO3 BLDMV-SCNC: 28 MMOL/L — SIGNIFICANT CHANGE UP (ref 20–28)
HCO3 BLDMV-SCNC: 29 MMOL/L — HIGH (ref 20–28)
HCO3 BLDMV-SCNC: 30 MMOL/L — HIGH (ref 20–28)
HCO3 BLDMV-SCNC: 31 MMOL/L — HIGH (ref 20–28)
HCO3 BLDV-SCNC: 25 MMOL/L — SIGNIFICANT CHANGE UP (ref 22–29)
HCO3 BLDV-SCNC: 27 MMOL/L — SIGNIFICANT CHANGE UP (ref 22–29)
HCO3 BLDV-SCNC: 27 MMOL/L — SIGNIFICANT CHANGE UP (ref 22–29)
HCT VFR BLD CALC: 26 % — LOW (ref 39–50)
HCT VFR BLD CALC: 29.7 % — LOW (ref 39–50)
HCT VFR BLD CALC: 32.3 % — LOW (ref 39–50)
HCT VFR BLDA CALC: 23 % — LOW (ref 39–51)
HEPARINASE TEG R TIME: 13.3 MIN (ref 4.3–8.3)
HGB BLD CALC-MCNC: 7.7 G/DL — LOW (ref 12.6–17.4)
HGB BLD CALC-MCNC: 7.8 G/DL — LOW (ref 12.6–17.4)
HGB BLD CALC-MCNC: 7.8 G/DL — LOW (ref 12.6–17.4)
HGB BLD-MCNC: 10.9 G/DL — LOW (ref 13–17)
HGB BLD-MCNC: 9 G/DL — LOW (ref 13–17)
HGB BLD-MCNC: 9.9 G/DL — LOW (ref 13–17)
HOROWITZ INDEX BLDMV+IHG-RTO: 100 — SIGNIFICANT CHANGE UP
HOROWITZ INDEX BLDMV+IHG-RTO: 100 — SIGNIFICANT CHANGE UP
HOROWITZ INDEX BLDMV+IHG-RTO: 50 — SIGNIFICANT CHANGE UP
HOROWITZ INDEX BLDMV+IHG-RTO: 60 — SIGNIFICANT CHANGE UP
HOROWITZ INDEX BLDMV+IHG-RTO: 80 — SIGNIFICANT CHANGE UP
IMM GRANULOCYTES NFR BLD AUTO: 0.4 % — SIGNIFICANT CHANGE UP (ref 0–1.5)
IMM GRANULOCYTES NFR BLD AUTO: 0.7 % — SIGNIFICANT CHANGE UP (ref 0–1.5)
INR BLD: 1.6 RATIO — HIGH (ref 0.88–1.16)
LACTATE BLDV-MCNC: 0.8 MMOL/L — SIGNIFICANT CHANGE UP (ref 0.7–2)
LACTATE BLDV-MCNC: 0.9 MMOL/L — SIGNIFICANT CHANGE UP (ref 0.7–2)
LACTATE BLDV-MCNC: 1.3 MMOL/L — SIGNIFICANT CHANGE UP (ref 0.7–2)
LYMPHOCYTES # BLD AUTO: 1.32 K/UL — SIGNIFICANT CHANGE UP (ref 1–3.3)
LYMPHOCYTES # BLD AUTO: 1.71 K/UL — SIGNIFICANT CHANGE UP (ref 1–3.3)
LYMPHOCYTES # BLD AUTO: 13.6 % — SIGNIFICANT CHANGE UP (ref 13–44)
LYMPHOCYTES # BLD AUTO: 15.6 % — SIGNIFICANT CHANGE UP (ref 13–44)
LYMPHOCYTES # BLD AUTO: 2.11 K/UL — SIGNIFICANT CHANGE UP (ref 1–3.3)
LYMPHOCYTES # BLD AUTO: 8 % — LOW (ref 13–44)
MAGNESIUM SERPL-MCNC: 2.3 MG/DL — SIGNIFICANT CHANGE UP (ref 1.6–2.6)
MAGNESIUM SERPL-MCNC: 2.6 MG/DL — SIGNIFICANT CHANGE UP (ref 1.6–2.6)
MANUAL SMEAR VERIFICATION: SIGNIFICANT CHANGE UP
MCHC RBC-ENTMCNC: 28.9 PG — SIGNIFICANT CHANGE UP (ref 27–34)
MCHC RBC-ENTMCNC: 29.4 PG — SIGNIFICANT CHANGE UP (ref 27–34)
MCHC RBC-ENTMCNC: 29.7 PG — SIGNIFICANT CHANGE UP (ref 27–34)
MCHC RBC-ENTMCNC: 33.3 GM/DL — SIGNIFICANT CHANGE UP (ref 32–36)
MCHC RBC-ENTMCNC: 33.7 GM/DL — SIGNIFICANT CHANGE UP (ref 32–36)
MCHC RBC-ENTMCNC: 34.6 GM/DL — SIGNIFICANT CHANGE UP (ref 32–36)
MCV RBC AUTO: 85.8 FL — SIGNIFICANT CHANGE UP (ref 80–100)
MCV RBC AUTO: 86.8 FL — SIGNIFICANT CHANGE UP (ref 80–100)
MCV RBC AUTO: 87.1 FL — SIGNIFICANT CHANGE UP (ref 80–100)
METAMYELOCYTES # FLD: 1 % — HIGH (ref 0–0)
MONOCYTES # BLD AUTO: 0.55 K/UL — SIGNIFICANT CHANGE UP (ref 0–0.9)
MONOCYTES # BLD AUTO: 1.07 K/UL — HIGH (ref 0–0.9)
MONOCYTES # BLD AUTO: 1.32 K/UL — HIGH (ref 0–0.9)
MONOCYTES NFR BLD AUTO: 5 % — SIGNIFICANT CHANGE UP (ref 2–14)
MONOCYTES NFR BLD AUTO: 5.7 % — SIGNIFICANT CHANGE UP (ref 2–14)
MONOCYTES NFR BLD AUTO: 9.7 % — SIGNIFICANT CHANGE UP (ref 2–14)
MYELOCYTES NFR BLD: 1 % — HIGH (ref 0–0)
NEUTROPHILS # BLD AUTO: 18.14 K/UL — HIGH (ref 1.8–7.4)
NEUTROPHILS # BLD AUTO: 7.61 K/UL — HIGH (ref 1.8–7.4)
NEUTROPHILS # BLD AUTO: 9.94 K/UL — HIGH (ref 1.8–7.4)
NEUTROPHILS NFR BLD AUTO: 73.4 % — SIGNIFICANT CHANGE UP (ref 43–77)
NEUTROPHILS NFR BLD AUTO: 78.3 % — HIGH (ref 43–77)
NEUTROPHILS NFR BLD AUTO: 83 % — HIGH (ref 43–77)
NEUTS BAND # BLD: 2 % — SIGNIFICANT CHANGE UP (ref 0–8)
NRBC # BLD: 0 /100 WBCS — SIGNIFICANT CHANGE UP (ref 0–0)
NRBC # BLD: 0 /100 WBCS — SIGNIFICANT CHANGE UP (ref 0–0)
NRBC # BLD: 0 /100 — SIGNIFICANT CHANGE UP (ref 0–0)
O2 CT VFR BLD CALC: 40 MMHG — SIGNIFICANT CHANGE UP (ref 30–65)
O2 CT VFR BLD CALC: 40 MMHG — SIGNIFICANT CHANGE UP (ref 30–65)
O2 CT VFR BLD CALC: 42 MMHG — SIGNIFICANT CHANGE UP (ref 30–65)
O2 CT VFR BLD CALC: 45 MMHG — SIGNIFICANT CHANGE UP (ref 30–65)
O2 CT VFR BLD CALC: 53 MMHG — SIGNIFICANT CHANGE UP (ref 30–65)
PCO2 BLDMV: 47 MMHG — SIGNIFICANT CHANGE UP (ref 30–65)
PCO2 BLDMV: 49 MMHG — SIGNIFICANT CHANGE UP (ref 30–65)
PCO2 BLDMV: 51 MMHG — SIGNIFICANT CHANGE UP (ref 30–65)
PCO2 BLDMV: 56 MMHG — SIGNIFICANT CHANGE UP (ref 30–65)
PCO2 BLDMV: 60 MMHG — SIGNIFICANT CHANGE UP (ref 30–65)
PCO2 BLDV: 46 MMHG — SIGNIFICANT CHANGE UP (ref 42–55)
PCO2 BLDV: 49 MMHG — SIGNIFICANT CHANGE UP (ref 42–55)
PCO2 BLDV: 50 MMHG — SIGNIFICANT CHANGE UP (ref 42–55)
PH BLDMV: 7.28 — LOW (ref 7.32–7.45)
PH BLDMV: 7.33 — SIGNIFICANT CHANGE UP (ref 7.32–7.45)
PH BLDMV: 7.37 — SIGNIFICANT CHANGE UP (ref 7.32–7.45)
PH BLDMV: 7.38 — SIGNIFICANT CHANGE UP (ref 7.32–7.45)
PH BLDMV: 7.39 — SIGNIFICANT CHANGE UP (ref 7.32–7.45)
PH BLDV: 7.31 — LOW (ref 7.32–7.43)
PH BLDV: 7.35 — SIGNIFICANT CHANGE UP (ref 7.32–7.43)
PH BLDV: 7.38 — SIGNIFICANT CHANGE UP (ref 7.32–7.43)
PHOSPHATE SERPL-MCNC: 2.5 MG/DL — SIGNIFICANT CHANGE UP (ref 2.5–4.5)
PHOSPHATE SERPL-MCNC: 5.3 MG/DL — HIGH (ref 2.5–4.5)
PLAT MORPH BLD: SIGNIFICANT CHANGE UP
PLATELET # BLD AUTO: SIGNIFICANT CHANGE UP K/UL (ref 150–400)
PLATELET # BLD AUTO: SIGNIFICANT CHANGE UP K/UL (ref 150–400)
PLATELET MAPPING ACTF MAX AMPLITUDE: 26.6 MM (ref 2–19)
PLATELET MAPPING ADP MAXIMUM AMPLITUDE: 56.9 MM — SIGNIFICANT CHANGE UP (ref 45–69)
PLATELET MAPPING ADP PERCENT INHIBITION: 31.4 % (ref 0–17)
PLATELET MAPPING HKH MAXIMUM AMPLITUDE: 70.8 MM (ref 53–68)
PO2 BLDV: 58 MMHG — HIGH (ref 25–45)
PO2 BLDV: 69 MMHG — HIGH (ref 25–45)
PO2 BLDV: 87 MMHG — HIGH (ref 25–45)
POTASSIUM BLDV-SCNC: 3.6 MMOL/L — SIGNIFICANT CHANGE UP (ref 3.5–5.1)
POTASSIUM BLDV-SCNC: 4.1 MMOL/L — SIGNIFICANT CHANGE UP (ref 3.5–5.1)
POTASSIUM BLDV-SCNC: 4.9 MMOL/L — SIGNIFICANT CHANGE UP (ref 3.5–5.1)
POTASSIUM SERPL-MCNC: 4.2 MMOL/L — SIGNIFICANT CHANGE UP (ref 3.5–5.3)
POTASSIUM SERPL-MCNC: 4.3 MMOL/L — SIGNIFICANT CHANGE UP (ref 3.5–5.3)
POTASSIUM SERPL-SCNC: 4.2 MMOL/L — SIGNIFICANT CHANGE UP (ref 3.5–5.3)
POTASSIUM SERPL-SCNC: 4.3 MMOL/L — SIGNIFICANT CHANGE UP (ref 3.5–5.3)
PROT SERPL-MCNC: 4.8 G/DL — LOW (ref 6–8.3)
PROT SERPL-MCNC: 6.2 G/DL — SIGNIFICANT CHANGE UP (ref 6–8.3)
PROTHROM AB SERPL-ACNC: 18.6 SEC — HIGH (ref 10.5–13.4)
RAPIDTEG MAXIMUM AMPLITUDE: 68.9 MM — SIGNIFICANT CHANGE UP (ref 52–70)
RBC # BLD: 3.03 M/UL — LOW (ref 4.2–5.8)
RBC # BLD: 3.42 M/UL — LOW (ref 4.2–5.8)
RBC # BLD: 3.71 M/UL — LOW (ref 4.2–5.8)
RBC # FLD: 12.2 % — SIGNIFICANT CHANGE UP (ref 10.3–14.5)
RBC # FLD: 12.5 % — SIGNIFICANT CHANGE UP (ref 10.3–14.5)
RBC # FLD: 12.9 % — SIGNIFICANT CHANGE UP (ref 10.3–14.5)
RBC BLD AUTO: SIGNIFICANT CHANGE UP
SAO2 % BLDMV: 67.3 — SIGNIFICANT CHANGE UP (ref 60–90)
SAO2 % BLDMV: 72.2 — SIGNIFICANT CHANGE UP (ref 60–90)
SAO2 % BLDMV: 72.4 — SIGNIFICANT CHANGE UP (ref 60–90)
SAO2 % BLDMV: 76 — SIGNIFICANT CHANGE UP (ref 60–90)
SAO2 % BLDMV: 78.6 — SIGNIFICANT CHANGE UP (ref 60–90)
SAO2 % BLDV: 92.9 % — HIGH (ref 67–88)
SAO2 % BLDV: 95.7 % — HIGH (ref 67–88)
SAO2 % BLDV: 98.4 % — HIGH (ref 67–88)
SODIUM SERPL-SCNC: 128 MMOL/L — LOW (ref 135–145)
SODIUM SERPL-SCNC: 140 MMOL/L — SIGNIFICANT CHANGE UP (ref 135–145)
TEG FUNCTIONAL FIBRINOGEN: 40.1 MM (ref 15–32)
TEG MAXIMUM AMPLITUDE: 69.4 MM (ref 52–69)
TEG REACTION TIME: 11.7 MIN (ref 4.6–9.1)
TROPONIN T, HIGH SENSITIVITY RESULT: 2322 NG/L — HIGH (ref 0–51)
WBC # BLD: 13.54 K/UL — HIGH (ref 3.8–10.5)
WBC # BLD: 21.34 K/UL — HIGH (ref 3.8–10.5)
WBC # BLD: 9.71 K/UL — SIGNIFICANT CHANGE UP (ref 3.8–10.5)
WBC # FLD AUTO: 13.54 K/UL — HIGH (ref 3.8–10.5)
WBC # FLD AUTO: 21.34 K/UL — HIGH (ref 3.8–10.5)
WBC # FLD AUTO: 9.71 K/UL — SIGNIFICANT CHANGE UP (ref 3.8–10.5)

## 2022-03-23 PROCEDURE — 33518 CABG ARTERY-VEIN TWO: CPT

## 2022-03-23 PROCEDURE — 33508 ENDOSCOPIC VEIN HARVEST: CPT | Mod: AS,59

## 2022-03-23 PROCEDURE — 33518 CABG ARTERY-VEIN TWO: CPT | Mod: AS

## 2022-03-23 PROCEDURE — 99291 CRITICAL CARE FIRST HOUR: CPT

## 2022-03-23 PROCEDURE — 93010 ELECTROCARDIOGRAM REPORT: CPT

## 2022-03-23 PROCEDURE — 33533 CABG ARTERIAL SINGLE: CPT

## 2022-03-23 PROCEDURE — 33533 CABG ARTERIAL SINGLE: CPT | Mod: AS

## 2022-03-23 PROCEDURE — 71045 X-RAY EXAM CHEST 1 VIEW: CPT | Mod: 26

## 2022-03-23 PROCEDURE — 33508 ENDOSCOPIC VEIN HARVEST: CPT | Mod: 59

## 2022-03-23 DEVICE — CANNULA AORTIC ROOT 14G X 14CM FLANGED: Type: IMPLANTABLE DEVICE | Status: FUNCTIONAL

## 2022-03-23 DEVICE — CANNULA VENOUS 2 STAGE THIN FLEX 36/46FR X 1/2" WITH RETURN DIAL: Type: IMPLANTABLE DEVICE | Status: FUNCTIONAL

## 2022-03-23 DEVICE — CHEST DRAIN THORACIC ARGYLE PVC 36FR STRAIGHT: Type: IMPLANTABLE DEVICE | Status: FUNCTIONAL

## 2022-03-23 DEVICE — LIGATING CLIPS WECK HORIZON SMALL-WIDE (RED) 24: Type: IMPLANTABLE DEVICE | Status: FUNCTIONAL

## 2022-03-23 DEVICE — LIGATING CLIPS WECK HORIZON MEDIUM (BLUE) 24: Type: IMPLANTABLE DEVICE | Status: FUNCTIONAL

## 2022-03-23 DEVICE — CANNULA ARTERIAL 8IN 22FR: Type: IMPLANTABLE DEVICE | Status: FUNCTIONAL

## 2022-03-23 DEVICE — IMPLANTABLE DEVICE: Type: IMPLANTABLE DEVICE | Status: FUNCTIONAL

## 2022-03-23 DEVICE — VISTASEAL FIBRIN HUMAN 4ML: Type: IMPLANTABLE DEVICE | Status: FUNCTIONAL

## 2022-03-23 DEVICE — CHEST DRAIN THORACIC ARGYLE PVC 32FR RIGHT ANGLE: Type: IMPLANTABLE DEVICE | Status: FUNCTIONAL

## 2022-03-23 DEVICE — CANNULA VESSEL 3MM BLUNT TIP CLEAR 1-WAY VALVE: Type: IMPLANTABLE DEVICE | Status: FUNCTIONAL

## 2022-03-23 RX ORDER — POLYETHYLENE GLYCOL 3350 17 G/17G
17 POWDER, FOR SOLUTION ORAL DAILY
Refills: 0 | Status: DISCONTINUED | OUTPATIENT
Start: 2022-03-24 | End: 2022-03-30

## 2022-03-23 RX ORDER — MEPERIDINE HYDROCHLORIDE 50 MG/ML
25 INJECTION INTRAMUSCULAR; INTRAVENOUS; SUBCUTANEOUS ONCE
Refills: 0 | Status: DISCONTINUED | OUTPATIENT
Start: 2022-03-23 | End: 2022-03-23

## 2022-03-23 RX ORDER — ALBUMIN HUMAN 25 %
250 VIAL (ML) INTRAVENOUS ONCE
Refills: 0 | Status: COMPLETED | OUTPATIENT
Start: 2022-03-23 | End: 2022-03-23

## 2022-03-23 RX ORDER — CHLORHEXIDINE GLUCONATE 213 G/1000ML
5 SOLUTION TOPICAL
Refills: 0 | Status: DISCONTINUED | OUTPATIENT
Start: 2022-03-23 | End: 2022-03-25

## 2022-03-23 RX ORDER — CEFUROXIME AXETIL 250 MG
1500 TABLET ORAL EVERY 8 HOURS
Refills: 0 | Status: COMPLETED | OUTPATIENT
Start: 2022-03-23 | End: 2022-03-25

## 2022-03-23 RX ORDER — ACETAMINOPHEN 500 MG
650 TABLET ORAL EVERY 6 HOURS
Refills: 0 | Status: DISCONTINUED | OUTPATIENT
Start: 2022-03-26 | End: 2022-03-30

## 2022-03-23 RX ORDER — SODIUM CHLORIDE 9 MG/ML
1000 INJECTION INTRAMUSCULAR; INTRAVENOUS; SUBCUTANEOUS
Refills: 0 | Status: DISCONTINUED | OUTPATIENT
Start: 2022-03-23 | End: 2022-03-27

## 2022-03-23 RX ORDER — HYDROMORPHONE HYDROCHLORIDE 2 MG/ML
0.5 INJECTION INTRAMUSCULAR; INTRAVENOUS; SUBCUTANEOUS EVERY 6 HOURS
Refills: 0 | Status: DISCONTINUED | OUTPATIENT
Start: 2022-03-23 | End: 2022-03-25

## 2022-03-23 RX ORDER — MEPERIDINE HYDROCHLORIDE 50 MG/ML
25 INJECTION INTRAMUSCULAR; INTRAVENOUS; SUBCUTANEOUS ONCE
Refills: 0 | Status: COMPLETED | OUTPATIENT
Start: 2022-03-23

## 2022-03-23 RX ORDER — SENNA PLUS 8.6 MG/1
2 TABLET ORAL AT BEDTIME
Refills: 0 | Status: DISCONTINUED | OUTPATIENT
Start: 2022-03-24 | End: 2022-03-30

## 2022-03-23 RX ORDER — HYDROMORPHONE HYDROCHLORIDE 2 MG/ML
0.5 INJECTION INTRAMUSCULAR; INTRAVENOUS; SUBCUTANEOUS ONCE
Refills: 0 | Status: DISCONTINUED | OUTPATIENT
Start: 2022-03-23 | End: 2022-03-23

## 2022-03-23 RX ORDER — PANTOPRAZOLE SODIUM 20 MG/1
40 TABLET, DELAYED RELEASE ORAL DAILY
Refills: 0 | Status: DISCONTINUED | OUTPATIENT
Start: 2022-03-23 | End: 2022-03-26

## 2022-03-23 RX ORDER — ASCORBIC ACID 60 MG
500 TABLET,CHEWABLE ORAL
Refills: 0 | Status: COMPLETED | OUTPATIENT
Start: 2022-03-23 | End: 2022-03-28

## 2022-03-23 RX ORDER — MILRINONE LACTATE 1 MG/ML
0.2 INJECTION, SOLUTION INTRAVENOUS
Qty: 20 | Refills: 0 | Status: DISCONTINUED | OUTPATIENT
Start: 2022-03-23 | End: 2022-03-26

## 2022-03-23 RX ORDER — DOBUTAMINE HCL 250MG/20ML
5 VIAL (ML) INTRAVENOUS
Qty: 500 | Refills: 0 | Status: DISCONTINUED | OUTPATIENT
Start: 2022-03-23 | End: 2022-03-23

## 2022-03-23 RX ORDER — POTASSIUM CHLORIDE 20 MEQ
10 PACKET (EA) ORAL
Refills: 0 | Status: DISCONTINUED | OUTPATIENT
Start: 2022-03-23 | End: 2022-03-25

## 2022-03-23 RX ORDER — ASPIRIN/CALCIUM CARB/MAGNESIUM 324 MG
300 TABLET ORAL ONCE
Refills: 0 | Status: COMPLETED | OUTPATIENT
Start: 2022-03-23

## 2022-03-23 RX ORDER — INSULIN HUMAN 100 [IU]/ML
3 INJECTION, SOLUTION SUBCUTANEOUS
Qty: 100 | Refills: 0 | Status: DISCONTINUED | OUTPATIENT
Start: 2022-03-23 | End: 2022-03-27

## 2022-03-23 RX ORDER — ASPIRIN/CALCIUM CARB/MAGNESIUM 324 MG
81 TABLET ORAL DAILY
Refills: 0 | Status: DISCONTINUED | OUTPATIENT
Start: 2022-03-23 | End: 2022-03-30

## 2022-03-23 RX ORDER — DEXMEDETOMIDINE HYDROCHLORIDE IN 0.9% SODIUM CHLORIDE 4 UG/ML
0.5 INJECTION INTRAVENOUS
Qty: 200 | Refills: 0 | Status: DISCONTINUED | OUTPATIENT
Start: 2022-03-23 | End: 2022-03-24

## 2022-03-23 RX ORDER — VASOPRESSIN 20 [USP'U]/ML
0.1 INJECTION INTRAVENOUS
Qty: 50 | Refills: 0 | Status: DISCONTINUED | OUTPATIENT
Start: 2022-03-23 | End: 2022-03-25

## 2022-03-23 RX ORDER — ALBUMIN HUMAN 25 %
250 VIAL (ML) INTRAVENOUS ONCE
Refills: 0 | Status: DISCONTINUED | OUTPATIENT
Start: 2022-03-23 | End: 2022-03-25

## 2022-03-23 RX ORDER — VASOPRESSIN 20 [USP'U]/ML
0.03 INJECTION INTRAVENOUS
Qty: 50 | Refills: 0 | Status: DISCONTINUED | OUTPATIENT
Start: 2022-03-23 | End: 2022-03-23

## 2022-03-23 RX ORDER — CALCIUM GLUCONATE 100 MG/ML
1 VIAL (ML) INTRAVENOUS ONCE
Refills: 0 | Status: COMPLETED | OUTPATIENT
Start: 2022-03-23 | End: 2022-03-23

## 2022-03-23 RX ORDER — CHLORHEXIDINE GLUCONATE 213 G/1000ML
1 SOLUTION TOPICAL DAILY
Refills: 0 | Status: DISCONTINUED | OUTPATIENT
Start: 2022-03-23 | End: 2022-03-27

## 2022-03-23 RX ORDER — GABAPENTIN 400 MG/1
100 CAPSULE ORAL EVERY 8 HOURS
Refills: 0 | Status: COMPLETED | OUTPATIENT
Start: 2022-03-23 | End: 2022-03-28

## 2022-03-23 RX ORDER — PROPOFOL 10 MG/ML
20 INJECTION, EMULSION INTRAVENOUS
Qty: 500 | Refills: 0 | Status: DISCONTINUED | OUTPATIENT
Start: 2022-03-23 | End: 2022-03-24

## 2022-03-23 RX ORDER — DEXTROSE 50 % IN WATER 50 %
25 SYRINGE (ML) INTRAVENOUS
Refills: 0 | Status: DISCONTINUED | OUTPATIENT
Start: 2022-03-23 | End: 2022-03-25

## 2022-03-23 RX ORDER — ACETAMINOPHEN 500 MG
650 TABLET ORAL EVERY 6 HOURS
Refills: 0 | Status: COMPLETED | OUTPATIENT
Start: 2022-03-23 | End: 2022-03-26

## 2022-03-23 RX ORDER — DOBUTAMINE HCL 250MG/20ML
3 VIAL (ML) INTRAVENOUS
Qty: 1000 | Refills: 0 | Status: DISCONTINUED | OUTPATIENT
Start: 2022-03-23 | End: 2022-03-25

## 2022-03-23 RX ORDER — AMIODARONE HYDROCHLORIDE 400 MG/1
400 TABLET ORAL
Refills: 0 | Status: COMPLETED | OUTPATIENT
Start: 2022-03-23 | End: 2022-03-26

## 2022-03-23 RX ORDER — OXYCODONE HYDROCHLORIDE 5 MG/1
5 TABLET ORAL EVERY 4 HOURS
Refills: 0 | Status: DISCONTINUED | OUTPATIENT
Start: 2022-03-23 | End: 2022-03-25

## 2022-03-23 RX ORDER — DEXTROSE 50 % IN WATER 50 %
50 SYRINGE (ML) INTRAVENOUS
Refills: 0 | Status: DISCONTINUED | OUTPATIENT
Start: 2022-03-23 | End: 2022-03-30

## 2022-03-23 RX ORDER — NOREPINEPHRINE BITARTRATE/D5W 8 MG/250ML
0.05 PLASTIC BAG, INJECTION (ML) INTRAVENOUS
Qty: 8 | Refills: 0 | Status: DISCONTINUED | OUTPATIENT
Start: 2022-03-23 | End: 2022-03-25

## 2022-03-23 RX ORDER — OXYCODONE HYDROCHLORIDE 5 MG/1
10 TABLET ORAL EVERY 4 HOURS
Refills: 0 | Status: DISCONTINUED | OUTPATIENT
Start: 2022-03-23 | End: 2022-03-30

## 2022-03-23 RX ORDER — ASPIRIN/CALCIUM CARB/MAGNESIUM 324 MG
300 TABLET ORAL ONCE
Refills: 0 | Status: COMPLETED | OUTPATIENT
Start: 2022-03-23 | End: 2022-03-23

## 2022-03-23 RX ADMIN — CHLORHEXIDINE GLUCONATE 1 APPLICATION(S): 213 SOLUTION TOPICAL at 18:18

## 2022-03-23 RX ADMIN — Medication 125 MILLILITER(S): at 14:50

## 2022-03-23 RX ADMIN — PROPOFOL 10 MICROGRAM(S)/KG/MIN: 10 INJECTION, EMULSION INTRAVENOUS at 16:07

## 2022-03-23 RX ADMIN — MILRINONE LACTATE 10 MICROGRAM(S)/KG/MIN: 1 INJECTION, SOLUTION INTRAVENOUS at 16:08

## 2022-03-23 RX ADMIN — SODIUM CHLORIDE 10 MILLILITER(S): 9 INJECTION INTRAMUSCULAR; INTRAVENOUS; SUBCUTANEOUS at 16:07

## 2022-03-23 RX ADMIN — MEPERIDINE HYDROCHLORIDE 25 MILLIGRAM(S): 50 INJECTION INTRAMUSCULAR; INTRAVENOUS; SUBCUTANEOUS at 15:30

## 2022-03-23 RX ADMIN — Medication 100 MILLIGRAM(S): at 23:37

## 2022-03-23 RX ADMIN — HYDROMORPHONE HYDROCHLORIDE 0.5 MILLIGRAM(S): 2 INJECTION INTRAMUSCULAR; INTRAVENOUS; SUBCUTANEOUS at 17:00

## 2022-03-23 RX ADMIN — PANTOPRAZOLE SODIUM 40 MILLIGRAM(S): 20 TABLET, DELAYED RELEASE ORAL at 17:21

## 2022-03-23 RX ADMIN — Medication 7.84 MICROGRAM(S)/KG/MIN: at 16:08

## 2022-03-23 RX ADMIN — MEPERIDINE HYDROCHLORIDE 25 MILLIGRAM(S): 50 INJECTION INTRAMUSCULAR; INTRAVENOUS; SUBCUTANEOUS at 15:00

## 2022-03-23 RX ADMIN — DEXMEDETOMIDINE HYDROCHLORIDE IN 0.9% SODIUM CHLORIDE 10.5 MICROGRAM(S)/KG/HR: 4 INJECTION INTRAVENOUS at 16:07

## 2022-03-23 RX ADMIN — HYDROMORPHONE HYDROCHLORIDE 0.5 MILLIGRAM(S): 2 INJECTION INTRAMUSCULAR; INTRAVENOUS; SUBCUTANEOUS at 16:00

## 2022-03-23 RX ADMIN — Medication 125 MILLILITER(S): at 17:23

## 2022-03-23 RX ADMIN — Medication 300 MILLIGRAM(S): at 22:00

## 2022-03-23 RX ADMIN — CHLORHEXIDINE GLUCONATE 1 APPLICATION(S): 213 SOLUTION TOPICAL at 02:50

## 2022-03-23 RX ADMIN — VASOPRESSIN 6 UNIT(S)/MIN: 20 INJECTION INTRAVENOUS at 16:07

## 2022-03-23 RX ADMIN — Medication 6.27 MICROGRAM(S)/KG/MIN: at 16:08

## 2022-03-23 RX ADMIN — Medication 100 MILLIGRAM(S): at 16:05

## 2022-03-23 RX ADMIN — Medication 100 GRAM(S): at 16:05

## 2022-03-23 RX ADMIN — HYDROMORPHONE HYDROCHLORIDE 0.5 MILLIGRAM(S): 2 INJECTION INTRAMUSCULAR; INTRAVENOUS; SUBCUTANEOUS at 16:15

## 2022-03-23 RX ADMIN — HYDROMORPHONE HYDROCHLORIDE 0.5 MILLIGRAM(S): 2 INJECTION INTRAMUSCULAR; INTRAVENOUS; SUBCUTANEOUS at 16:40

## 2022-03-23 RX ADMIN — CHLORHEXIDINE GLUCONATE 5 MILLILITER(S): 213 SOLUTION TOPICAL at 17:21

## 2022-03-23 RX ADMIN — CHLORHEXIDINE GLUCONATE 15 MILLILITER(S): 213 SOLUTION TOPICAL at 06:20

## 2022-03-23 RX ADMIN — Medication 125 MILLILITER(S): at 16:23

## 2022-03-23 NOTE — PROGRESS NOTE ADULT - SUBJECTIVE AND OBJECTIVE BOX
CRITICAL CARE ATTENDING - CTICU    MEDICATIONS  (STANDING):  aspirin enteric coated 81 milliGRAM(s) Oral daily  atorvastatin 80 milliGRAM(s) Oral at bedtime  cefuroxime  IVPB 1500 milliGRAM(s) IV Intermittent once  chlorhexidine 2% Cloths 1 Application(s) Topical <User Schedule>  chlorhexidine 4% Liquid 1 Application(s) Topical <User Schedule>  dexMEDEtomidine Infusion 0.2 MICROgram(s)/kG/Hr (5.13 mL/Hr) IV Continuous <Continuous>  DOBUTamine Infusion 1 MICROgram(s)/kG/Min (3.08 mL/Hr) IV Continuous <Continuous>  heparin  Infusion 1500 Unit(s)/Hr (21 mL/Hr) IV Continuous <Continuous>  insulin regular Infusion 6 Unit(s)/Hr (6 mL/Hr) IV Continuous <Continuous>  lidocaine   4% Patch 1 Patch Transdermal every 24 hours  magnesium sulfate  IVPB 2 Gram(s) IV Intermittent daily  milrinone Infusion 0.4 MICROgram(s)/kG/Min (12.3 mL/Hr) IV Continuous <Continuous>  pantoprazole    Tablet 40 milliGRAM(s) Oral before breakfast  senna 2 Tablet(s) Oral at bedtime  vasopressin Infusion 0.033 Unit(s)/Min (2 mL/Hr) IV Continuous <Continuous>                              10.9   13.54 )-----------( Clumped    ( 23 Mar 2022 00:24 )             32.3       03-23    128<L>  |  92<L>  |  12  ----------------------------<  128<H>  4.3   |  25  |  0.62    Ca    8.6      23 Mar 2022 00:24  Phos  2.5     03-  Mg     2.3     03-23    TPro  6.2  /  Alb  3.2<L>  /  TBili  0.4  /  DBili  x   /  AST  44<H>  /  ALT  43  /  AlkPhos  102  03-23      PT/INR - ( 22 Mar 2022 00:39 )   PT: 17.7 sec;   INR: 1.52 ratio         PTT - ( 23 Mar 2022 04:12 )  PTT:86.4 sec        Daily Height in cm: 180.34 (23 Mar 2022 06:33)    Daily Weight in k.6 (23 Mar 2022 00:00)      03-21 @ 07:  -   @ 07:00  --------------------------------------------------------  IN: 1711.1 mL / OUT: 5175 mL / NET: -3463.9 mL     @ 07:01   @ 06:57  --------------------------------------------------------  IN: 1056.8 mL / OUT: 2680 mL / NET: -1623.2 mL        Critically Ill patient  : [ x] preoperative ,   [ ] post operative    Requires :  [ x] Arterial Line   [ x] Central Line  [ ] PA catheter  [x ] IABP  [ ] ECMO  [ ] LVAD  [ ] Ventilator  [ ] pacemaker [ ] Impella.                      [x ] ABG's     [ x] Pulse Oxymetry Monitoring  Bedside evaluation , monitoring , treatment of hemodynamics , fluids , IVP/ IVCD meds.        Diagnosis:     OP Day     Multivessel CAD, post infract cardiogenic shock s/p IABP placement     CHF     IABP   [x ] management   [ ] wean 1:1 1:2 1:3   [ ] removal and f/u vascular checks     Requires chest PT, pulmonary toilet, ambu bagging, suctioning to maintain SaO2,  patent airway and treat atelectasis.     CHF- acute [ x]   chronic [ x]    systolic [ x]   diatolic [ ]          - Echo- EF -      20-25%       [ ] RV dysfunction          - Cxr-cardiomegally, edema          - Clinical-  [x ]inotropes   [ ]pressors   [ ]diuresis   [ x]IABP   [ ]ECMO   [ ]LVAD   [ ]Respiratory Failure.     Hemodynamic lability,  instability. Requires IVCD [x ] vasopressors [x ] inotropes  [ ] vasodilator  [ ]IVSS fluid  to maintain MAP, perfusion, C.I.     IVCD anticoagulation with [x ] Heparin  [ ] Argatroban for     IVCD insulin           By signing my name below, I, Kelli Beckman, attest that this documentation has been prepared under the direction and in the presence of Shorty Moon MD.   Electronically Signed: Evan Rasmussen 22 @ 06:57      Discussed with CT surgeon, Physician Assistant - Nurse Practitioner- Critical care medicine team.   Discussed at  AM / PM rounds.   Chart, labs , films reviewed.    Cumulative Critical Care Time Given Today:  CRITICAL CARE ATTENDING - CTICU    MEDICATIONS  (STANDING):  aspirin enteric coated 81 milliGRAM(s) Oral daily  atorvastatin 80 milliGRAM(s) Oral at bedtime  cefuroxime  IVPB 1500 milliGRAM(s) IV Intermittent once  chlorhexidine 2% Cloths 1 Application(s) Topical <User Schedule>  chlorhexidine 4% Liquid 1 Application(s) Topical <User Schedule>  dexMEDEtomidine Infusion 0.2 MICROgram(s)/kG/Hr (5.13 mL/Hr) IV Continuous <Continuous>  DOBUTamine Infusion 1 MICROgram(s)/kG/Min (3.08 mL/Hr) IV Continuous <Continuous>  heparin  Infusion 1500 Unit(s)/Hr (21 mL/Hr) IV Continuous <Continuous>  insulin regular Infusion 6 Unit(s)/Hr (6 mL/Hr) IV Continuous <Continuous>  lidocaine   4% Patch 1 Patch Transdermal every 24 hours  magnesium sulfate  IVPB 2 Gram(s) IV Intermittent daily  milrinone Infusion 0.4 MICROgram(s)/kG/Min (12.3 mL/Hr) IV Continuous <Continuous>  pantoprazole    Tablet 40 milliGRAM(s) Oral before breakfast  senna 2 Tablet(s) Oral at bedtime  vasopressin Infusion 0.033 Unit(s)/Min (2 mL/Hr) IV Continuous <Continuous>                              10.9   13.54 )-----------( Clumped    ( 23 Mar 2022 00:24 )             32.3       03-23    128<L>  |  92<L>  |  12  ----------------------------<  128<H>  4.3   |  25  |  0.62    Ca    8.6      23 Mar 2022 00:24  Phos  2.5     03-  Mg     2.3     03-23    TPro  6.2  /  Alb  3.2<L>  /  TBili  0.4  /  DBili  x   /  AST  44<H>  /  ALT  43  /  AlkPhos  102  03-23      PT/INR - ( 22 Mar 2022 00:39 )   PT: 17.7 sec;   INR: 1.52 ratio         PTT - ( 23 Mar 2022 04:12 )  PTT:86.4 sec        Daily Height in cm: 180.34 (23 Mar 2022 06:33)    Daily Weight in k.6 (23 Mar 2022 00:00)      03-21 @ 07:  -   @ 07:00  --------------------------------------------------------  IN: 1711.1 mL / OUT: 5175 mL / NET: -3463.9 mL     @ 07:01   @ 06:57  --------------------------------------------------------  IN: 1056.8 mL / OUT: 2680 mL / NET: -1623.2 mL        Critically Ill patient  : [ x] preoperative ,   [ ] post operative    Requires :  [ x] Arterial Line   [ x] Central Line  [ ] PA catheter  [x ] IABP  [ ] ECMO  [ ] LVAD  [ ] Ventilator  [ ] pacemaker [ ] Impella.                      [x ] ABG's     [ x] Pulse Oxymetry Monitoring  Bedside evaluation , monitoring , treatment of hemodynamics , fluids , IVP/ IVCD meds.        Diagnosis:     OP Day     Multivessel CAD, post infract cardiogenic shock s/p IABP placement     CHF     IABP   [x ] management   [ ] wean 1:1 1:2 1:3   [ ] removal and f/u vascular checks     Requires chest PT, pulmonary toilet, ambu bagging, suctioning to maintain SaO2,  patent airway and treat atelectasis.     [place] gaze catheter interpretation and therapeutic management of unstable hemodynamics     CHF- acute [ x]   chronic [ x]    systolic [ x]   diatolic [ ]          - Echo- EF -      20-25%       [ ] RV dysfunction          - Cxr-cardiomegally, edema          - Clinical-  [x ]inotropes   [ ]pressors   [ ]diuresis   [ x]IABP   [ ]ECMO   [ ]LVAD   [ ]Respiratory Failure.     Hemodynamic lability,  instability. Requires IVCD [x ] vasopressors [x ] inotropes  [ ] vasodilator  [ ]IVSS fluid  to maintain MAP, perfusion, C.I.     IVCD anticoagulation with [x ] Heparin    IVCD insulin           By signing my name below, I, Kelli Beckman, attest that this documentation has been prepared under the direction and in the presence of Shorty Moon MD.   Electronically Signed: Evan Rasmsusen 22 @ 06:57      Discussed with CT surgeon, Physician Assistant - Nurse Practitioner- Critical care medicine team.   Discussed at  AM / PM rounds.   Chart, labs , films reviewed.    Cumulative Critical Care Time Given Today:  CRITICAL CARE ATTENDING - CTICU    MEDICATIONS  (STANDING):  aspirin enteric coated 81 milliGRAM(s) Oral daily  atorvastatin 80 milliGRAM(s) Oral at bedtime  cefuroxime  IVPB 1500 milliGRAM(s) IV Intermittent once  chlorhexidine 2% Cloths 1 Application(s) Topical <User Schedule>  chlorhexidine 4% Liquid 1 Application(s) Topical <User Schedule>  dexMEDEtomidine Infusion 0.2 MICROgram(s)/kG/Hr (5.13 mL/Hr) IV Continuous <Continuous>  DOBUTamine Infusion 1 MICROgram(s)/kG/Min (3.08 mL/Hr) IV Continuous <Continuous>  heparin  Infusion 1500 Unit(s)/Hr (21 mL/Hr) IV Continuous <Continuous>  insulin regular Infusion 6 Unit(s)/Hr (6 mL/Hr) IV Continuous <Continuous>  lidocaine   4% Patch 1 Patch Transdermal every 24 hours  magnesium sulfate  IVPB 2 Gram(s) IV Intermittent daily  milrinone Infusion 0.4 MICROgram(s)/kG/Min (12.3 mL/Hr) IV Continuous <Continuous>  pantoprazole    Tablet 40 milliGRAM(s) Oral before breakfast  senna 2 Tablet(s) Oral at bedtime  vasopressin Infusion 0.033 Unit(s)/Min (2 mL/Hr) IV Continuous <Continuous>                              10.9   13.54 )-----------( Clumped    ( 23 Mar 2022 00:24 )             32.3       03-23    128<L>  |  92<L>  |  12  ----------------------------<  128<H>  4.3   |  25  |  0.62    Ca    8.6      23 Mar 2022 00:24  Phos  2.5     03-  Mg     2.3     03-23    TPro  6.2  /  Alb  3.2<L>  /  TBili  0.4  /  DBili  x   /  AST  44<H>  /  ALT  43  /  AlkPhos  102  03-23      PT/INR - ( 22 Mar 2022 00:39 )   PT: 17.7 sec;   INR: 1.52 ratio         PTT - ( 23 Mar 2022 04:12 )  PTT:86.4 sec        Daily Height in cm: 180.34 (23 Mar 2022 06:33)    Daily Weight in k.6 (23 Mar 2022 00:00)      03-21 @ 07:  -   @ 07:00  --------------------------------------------------------  IN: 1711.1 mL / OUT: 5175 mL / NET: -3463.9 mL     @ 07:01   @ 06:57  --------------------------------------------------------  IN: 1056.8 mL / OUT: 2680 mL / NET: -1623.2 mL        Critically Ill patient  : [ x] preoperative ,   [ ] post operative    Requires :  [ x] Arterial Line   [ x] Central Line  [ ] PA catheter  [x ] IABP  [ ] ECMO  [ ] LVAD  [ ] Ventilator  [ ] pacemaker [ ] Impella.                      [x ] ABG's     [ x] Pulse Oxymetry Monitoring  Bedside evaluation , monitoring , treatment of hemodynamics , fluids , IVP/ IVCD meds.        Diagnosis:     OP Day CABG     Multivessel CAD, post infract cardiogenic shock s/p IABP placement     CHF- acute [x ]   chronic [x ]    systolic [x ]   diatolic [ ]          - Echo- EF - 20 - 30 %            [ ] RV dysfunction          - Cxr-cardiomegally, edema          - Clinical-  [ x]inotropes   [x ]pressors   [ ]diuresis   [x ]IABP   [ ]ECMO   [ ]LVAD   [ ]Respiratory Failure    Hypotension     Hypovolemia    Cardiogenic Shock           -     IABP   [x ] management   [ ] wean 1:1 1:2 1:3   [ ] removal and f/u vascular checks     Ventilator Management:  [x ]AC-rest post op    [ ]CPAP-PS Wean    [ ]Trach Collar     [ ]Extubate    [ ] T-Piece  [ ]peep>5     Requires chest PT, pulmonary toilet, ambu bagging, suctioning to maintain SaO2,  patent airway and treat atelectasis.     [place] gaze catheter interpretation and therapeutic management of unstable hemodynamics        Hemodynamic lability,  instability. Requires IVCD [x ] vasopressors [x ] inotropes  [ ] vasodilator  [ ]IVSS fluid  to maintain MAP, perfusion, C.I.     IVCD insulin     Chest Tube Drainage     Temporary pacemaker (TPM) interrogation and setting.           By signing my name below, I, Kelli Beckman, attest that this documentation has been prepared under the direction and in the presence of Shorty Moon MD.   Electronically Signed: Evan Rasmussen 22 @ 06:57    I, Shorty Moon, personally performed the services described in this documentation. All medical record entries made by the scribe were at my direction and in my presence. I have reviewed the chart and agree that the record reflects my personal performance and is accurate and complete.   Shorty Moon MD.       Discussed with CT surgeon, Physician Assistant - Nurse Practitioner- Critical care medicine team.   Discussed at  AM / PM rounds.   Chart, labs , films reviewed.    Cumulative Critical Care Time Given Today:  30 min

## 2022-03-23 NOTE — BRIEF OPERATIVE NOTE - COMMENTS
Gtts:  5, Primacor 0.4, Levo 0.2, vaso 0.1  I first assisted for the entirety of the case, including sternotomy, cardiopulmonary bypass, placement of coronary grafts and closing.

## 2022-03-23 NOTE — PROGRESS NOTE ADULT - SUBJECTIVE AND OBJECTIVE BOX
Patient seen and examined at the bedside.    Remained critically ill on continuous ICU monitoring.    OBJECTIVE:  Vital Signs Last 24 Hrs  T(C): 37 (23 Mar 2022 20:00), Max: 37.8 (22 Mar 2022 23:00)  T(F): 98.6 (23 Mar 2022 20:00), Max: 100 (22 Mar 2022 23:00)  HR: 112 (23 Mar 2022 21:00) (92 - 133)  BP: --  BP(mean): --  RR: 12 (23 Mar 2022 21:00) (12 - 29)  SpO2: 100% (23 Mar 2022 21:00) (88% - 100%)      Physical Exam:   General: intubated multiple lines gtt & tubes   Neurology: sedated  Eyes: bilateral pupils equal and reactive   ENT/Neck: +ETT midline, Neck supple, trachea midline, No JVD   Respiratory: Clear bilaterally   CV: S1S2, no murmurs        [x] Sternal dressing, [x] Mediastinal CT, [x] Pleural CT         [x] Sinus tachycardia, , [x] Temporary pacing - box off   Abdominal: Soft, NT, ND +BS,   Extremities: 1-2+ pedal edema noted, + peripheral pulses   Skin: No Rashes, Hematoma, Ecchymosis                           Assessment:  67 y/o Male with no PMHX     Admitted STEMI w/ new LBBB /  Multivessel CAD / post infarct cardiogenic shock   S/P IABP placement for severe LV Systolic failure on 3/19   Hemodynamically instability   Pulmonary edema   Stress hyperglycemia     Plan:   ***Neuro***  [x] Sedated with [x] Propofol   Post operative neuro assessment     ***Cardiovascular***  Invasive hemodynamic monitoring, assess perfusion indices   ST / MAP 80 / CVP 9 / PAP 21 / CI 2.7 / Hct 23% / Lactate 1.1  [x] Vasopressin 0.1mcg  [x] Levophed weaned to off [x] Dobutamine weaned 4->3mcg this PM [x] Primacor 0.4mcg   [x]  IABP with good diastolic augmentation at 1:2  Volume: [x]  Albumin x3 this AM / Plan for additional Albumin x1 tonight   Amiodarone for rate control   Reassessment of hemodynamics post resuscitation   [x] ASA   Serial EKG and cardiac enzymes     ***Pulmonary***   Post op vent management   Titration of FiO2 and PEEP, follow SpO2, CXR, blood gasses     Mode: AC/ CMV (Assist Control/ Continuous Mandatory Ventilation)  RR (machine): 12  TV (machine): 600  FiO2: 80  PEEP: 5  ITime: 1  MAP: 9  PIP: 22    ***GI***  [x] NPO    [x] Protonix      ***Renal***  Continue to monitor I/Os, BUN/Creatinine.   Replete lytes PRN  Jovel present [x] positive     ***ID***  Perioperative coverage with Cefuroxime     ***Endocrine***  [x] Stress Hyperglycemia HbA1c 11.4%              - [x] Insulin gtt              - Need tight glycemic control to prevent wound infection.          Patient requires continuous monitoring with bedside rhythm monitoring, pulse oximetry monitoring, and continuous central venous and arterial pressure monitoring; and intermittent blood gas analysis. Care plan discussed with the ICU care team.   Patient remained critical, at risk for life threatening decompensation.    I have spent 30 minutes providing critical care management to this patient.    By signing my name below, I, Cheryl Jackson, attest that this documentation has been prepared under the direction and in the presence of Samra Sarabia MD   Electronically signed: Evan Wilks, 03-23-22 @ 21:08    I, Samra Sarabia, personally performed the services described in this documentation. all medical record entries made by the pauloibmehnaz were at my direction and in my presence. I have reviewed the chart and agree that the record reflects my personal performance and is accurate and complete  Electronically signed: Samar Sarabia MD  Patient seen and examined at the bedside.    Remained critically ill on continuous ICU monitoring.    OBJECTIVE:  Vital Signs Last 24 Hrs  T(C): 37 (23 Mar 2022 20:00), Max: 37.8 (22 Mar 2022 23:00)  T(F): 98.6 (23 Mar 2022 20:00), Max: 100 (22 Mar 2022 23:00)  HR: 112 (23 Mar 2022 21:00) (92 - 133)  BP: --  BP(mean): --  RR: 12 (23 Mar 2022 21:00) (12 - 29)  SpO2: 100% (23 Mar 2022 21:00) (88% - 100%)      Physical Exam:   General: intubated multiple lines gtt & tubes   Neurology: sedated  Eyes: bilateral pupils equal and reactive   ENT/Neck: +ETT midline, Neck supple, trachea midline, No JVD   Respiratory: Clear bilaterally   CV: S1S2, no murmurs        [x] Sternal dressing, [x] Mediastinal CT, [x] Pleural CT         [x] Sinus tachycardia, , [x] Temporary pacing - box off   Abdominal: Soft, NT, ND +BS,   Extremities: 1-2+ pedal edema noted, + peripheral pulses   Skin: No Rashes, Hematoma, Ecchymosis                           Assessment:  65 y/o Male with no PMHX     Admitted STEMI w/ new LBBB /  Multivessel CAD / post infarct cardiogenic shock   S/P IABP placement for severe LV Systolic failure on 3/19   Hemodynamically instability   Pulmonary edema   Stress hyperglycemia     Plan:   ***Neuro***  [x] Sedated with [x] Propofol   Post operative neuro assessment     ***Cardiovascular***  Invasive hemodynamic monitoring, assess perfusion indices   ST / MAP 80 / CVP 9 / PAP 21 / CI 2.7 / Hct 23% / Lactate 1.1  [x] Vasopressin 0.1mcg  [x] Levophed weaned to off [x] Dobutamine weaned 5->3mcg this PM [x] Primacor 0.4mcg   [x]  IABP with good diastolic augmentation at 1:2  Volume: [x]  Albumin x3 this AM / Plan for additional Albumin x1 tonight   Amiodarone for rate control   Reassessment of hemodynamics post resuscitation   [x] ASA   Serial EKG and cardiac enzymes     ***Pulmonary***   Post op vent management   Titration of FiO2 and PEEP, follow SpO2, CXR, blood gasses     Mode: AC/ CMV (Assist Control/ Continuous Mandatory Ventilation)  RR (machine): 12  TV (machine): 600  FiO2: 80  PEEP: 5  ITime: 1  MAP: 9  PIP: 22    ***GI***  [x] NPO    [x] Protonix      ***Renal***  Continue to monitor I/Os, BUN/Creatinine.   Replete lytes PRN  Jovel present [x] positive     ***ID***  Perioperative coverage with Cefuroxime     ***Endocrine***  [x] Stress Hyperglycemia HbA1c 11.4%              - [x] Insulin gtt              - Need tight glycemic control to prevent wound infection.          Patient requires continuous monitoring with bedside rhythm monitoring, pulse oximetry monitoring, and continuous central venous and arterial pressure monitoring; and intermittent blood gas analysis. Care plan discussed with the ICU care team.   Patient remained critical, at risk for life threatening decompensation.    I have spent 30 minutes providing critical care management to this patient.    By signing my name below, I, Cheryl Jackson, attest that this documentation has been prepared under the direction and in the presence of Samra Sarabia MD   Electronically signed: Evan Wilks, 03-23-22 @ 21:08    I, Samra Saraiba, personally performed the services described in this documentation. all medical record entries made by the pauloibmehnaz were at my direction and in my presence. I have reviewed the chart and agree that the record reflects my personal performance and is accurate and complete  Electronically signed: Samra Sarabia MD  Patient seen and examined at the bedside.    Remained critically ill on continuous ICU monitoring.    OBJECTIVE:  Vital Signs Last 24 Hrs  T(C): 37 (23 Mar 2022 20:00), Max: 37.8 (22 Mar 2022 23:00)  T(F): 98.6 (23 Mar 2022 20:00), Max: 100 (22 Mar 2022 23:00)  HR: 112 (23 Mar 2022 21:00) (92 - 133)  BP: --  BP(mean): --  RR: 12 (23 Mar 2022 21:00) (12 - 29)  SpO2: 100% (23 Mar 2022 21:00) (88% - 100%)      Physical Exam:   General: intubated multiple lines gtt & tubes   Neurology: sedated  Eyes: bilateral pupils equal and reactive   ENT/Neck: +ETT midline, Neck supple, trachea midline, No JVD   Respiratory: Clear bilaterally   CV: S1S2, no murmurs        [x] Sternal dressing, [x] Mediastinal CT, [x] Pleural CT         [x] Sinus tachycardia, , [x] Temporary pacing - box off   Abdominal: Soft, NT, ND +BS,   Extremities: No pedal edema noted, + peripheral pulses   Skin: No Rashes, Hematoma, Ecchymosis                           Assessment:  65 y/o Male with no PMHX     Admitted STEMI w/ new LBBB /  Multivessel CAD / post infarct cardiogenic shock   S/P IABP placement for severe LV Systolic failure on 3/19   Hemodynamically instability   Pulmonary edema   Stress hyperglycemia     Plan:   ***Neuro***  [x] Sedated with [x] Propofol and Dexmedetomidine   Post operative neuro assessment     ***Cardiovascular***  Cardiogenic shock  Invasive hemodynamic monitoring, assess perfusion indices   ST / MAP 80 / CVP 9 / PAP 21 / CI 2.7 / Hct 23% / Lactate 1.1  [x] Vasopressin 0.1mcg  [x] Levophed weaned to off [x] Dobutamine weaned 5->3mcg this PM [x] Primacor 0.4mcg   [x]  IABP with good diastolic augmentation at 1:2 due to tachycardia  Volume: [x]  Albumin x3 this AM / Plan for additional Albumin x1 tonight   Amiodarone for atrial fibrillation prophylaxis   Reassessment of hemodynamics post resuscitation   [x] ASA   Serial EKG and cardiac enzymes     ***Pulmonary***   Post op vent management   Titration of FiO2 and PEEP, follow SpO2, CXR, blood gasses     Mode: AC/ CMV (Assist Control/ Continuous Mandatory Ventilation)  RR (machine): 12  TV (machine): 600  FiO2: 80  PEEP: 5  ITime: 1  MAP: 9  PIP: 22    ***GI***  [x] NPO    [x] Protonix for stress ulcer prophylaxis    ***Renal***  Optimize renal perfusion with adequate volume resuscitation and inotropic support  Continue to monitor I/Os, BUN/Creatinine.   Replete lytes PRN  Jovel present [x] positive     ***ID***  Perioperative coverage with Cefuroxime     ***Endocrine***  [x] Stress Hyperglycemia HbA1c 11.4%              - [x] Insulin gtt              - Need tight glycemic control to prevent wound infection.          Patient requires continuous monitoring with bedside rhythm monitoring, pulse oximetry monitoring, and continuous central venous, pulmonary artery and arterial pressure monitoring; and intermittent cardiac index, mixed venous and arterial blood gas analysis. Care plan discussed with the ICU care team.   Patient remained critical, at risk for life threatening decompensation.    I have spent 45 minutes providing critical care management to this patient.    By signing my name below, I, Cheryl Jackson, attest that this documentation has been prepared under the direction and in the presence of Samra Sarabia MD   Electronically signed: Evan Wilks, 03-23-22 @ 21:08    ISamra, personally performed the services described in this documentation. all medical record entries made by the scribmehnaz were at my direction and in my presence. I have reviewed the chart and agree that the record reflects my personal performance and is accurate and complete  Electronically signed: Samra Sarabia MD

## 2022-03-23 NOTE — PROVIDER CONTACT NOTE (CHANGE IN STATUS NOTIFICATION) - ACTION/TREATMENT ORDERED:
Precedex stopped. Stat ABG sent. Stat fingerstick. Narcan x2 for unresponsiveness. Pt on q1h neuro checks.

## 2022-03-23 NOTE — PROVIDER CONTACT NOTE (CHANGE IN STATUS NOTIFICATION) - ASSESSMENT
Pt unresponsive to verbal stimulation, emesis x1. Not following commands, on dobutamine, primacor, insulin, heparin, vasopressin, precedex gtt. Pt received belladonna suppository for bladder spasm at 2130.

## 2022-03-23 NOTE — BRIEF OPERATIVE NOTE - ADULT CT SURG CONDUIT HARVEST PERFORMED BY
Rosa Maria Sue PAC Rosa Maria Sue PAC (Right and left greater saphenous vein) endoscopic (upper thighs)

## 2022-03-24 LAB
ALBUMIN SERPL ELPH-MCNC: 3.4 G/DL — SIGNIFICANT CHANGE UP (ref 3.3–5)
ALP SERPL-CCNC: 54 U/L — SIGNIFICANT CHANGE UP (ref 40–120)
ALT FLD-CCNC: 19 U/L — SIGNIFICANT CHANGE UP (ref 10–45)
ANION GAP SERPL CALC-SCNC: 9 MMOL/L — SIGNIFICANT CHANGE UP (ref 5–17)
APTT BLD: 32.5 SEC — SIGNIFICANT CHANGE UP (ref 27.5–35.5)
APTT BLD: 32.8 SEC — SIGNIFICANT CHANGE UP (ref 27.5–35.5)
AST SERPL-CCNC: 30 U/L — SIGNIFICANT CHANGE UP (ref 10–40)
BASE EXCESS BLDMV CALC-SCNC: 1 MMOL/L — SIGNIFICANT CHANGE UP (ref -3–3)
BASE EXCESS BLDMV CALC-SCNC: 1.2 MMOL/L — SIGNIFICANT CHANGE UP (ref -3–3)
BASE EXCESS BLDMV CALC-SCNC: 1.7 MMOL/L — SIGNIFICANT CHANGE UP (ref -3–3)
BASE EXCESS BLDMV CALC-SCNC: 2 MMOL/L — SIGNIFICANT CHANGE UP (ref -3–3)
BILIRUB SERPL-MCNC: 0.7 MG/DL — SIGNIFICANT CHANGE UP (ref 0.2–1.2)
BUN SERPL-MCNC: 13 MG/DL — SIGNIFICANT CHANGE UP (ref 7–23)
CALCIUM SERPL-MCNC: 8.3 MG/DL — LOW (ref 8.4–10.5)
CHLORIDE SERPL-SCNC: 100 MMOL/L — SIGNIFICANT CHANGE UP (ref 96–108)
CO2 BLDMV-SCNC: 28 MMOL/L — SIGNIFICANT CHANGE UP (ref 21–29)
CO2 BLDMV-SCNC: 28 MMOL/L — SIGNIFICANT CHANGE UP (ref 21–29)
CO2 BLDMV-SCNC: 29 MMOL/L — SIGNIFICANT CHANGE UP (ref 21–29)
CO2 BLDMV-SCNC: 29 MMOL/L — SIGNIFICANT CHANGE UP (ref 21–29)
CO2 SERPL-SCNC: 26 MMOL/L — SIGNIFICANT CHANGE UP (ref 22–31)
CREAT SERPL-MCNC: 0.51 MG/DL — SIGNIFICANT CHANGE UP (ref 0.5–1.3)
EGFR: 112 ML/MIN/1.73M2 — SIGNIFICANT CHANGE UP
GAS PNL BLDA: SIGNIFICANT CHANGE UP
GAS PNL BLDMV: SIGNIFICANT CHANGE UP
GLUCOSE BLDC GLUCOMTR-MCNC: 119 MG/DL — HIGH (ref 70–99)
GLUCOSE BLDC GLUCOMTR-MCNC: 120 MG/DL — HIGH (ref 70–99)
GLUCOSE BLDC GLUCOMTR-MCNC: 162 MG/DL — HIGH (ref 70–99)
GLUCOSE BLDC GLUCOMTR-MCNC: 164 MG/DL — HIGH (ref 70–99)
GLUCOSE BLDC GLUCOMTR-MCNC: 171 MG/DL — HIGH (ref 70–99)
GLUCOSE BLDC GLUCOMTR-MCNC: 172 MG/DL — HIGH (ref 70–99)
GLUCOSE BLDC GLUCOMTR-MCNC: 172 MG/DL — HIGH (ref 70–99)
GLUCOSE BLDC GLUCOMTR-MCNC: 174 MG/DL — HIGH (ref 70–99)
GLUCOSE BLDC GLUCOMTR-MCNC: 175 MG/DL — HIGH (ref 70–99)
GLUCOSE BLDC GLUCOMTR-MCNC: 178 MG/DL — HIGH (ref 70–99)
GLUCOSE BLDC GLUCOMTR-MCNC: 180 MG/DL — HIGH (ref 70–99)
GLUCOSE BLDC GLUCOMTR-MCNC: 182 MG/DL — HIGH (ref 70–99)
GLUCOSE BLDC GLUCOMTR-MCNC: 189 MG/DL — HIGH (ref 70–99)
GLUCOSE BLDC GLUCOMTR-MCNC: 190 MG/DL — HIGH (ref 70–99)
GLUCOSE BLDC GLUCOMTR-MCNC: 191 MG/DL — HIGH (ref 70–99)
GLUCOSE BLDC GLUCOMTR-MCNC: 197 MG/DL — HIGH (ref 70–99)
GLUCOSE BLDC GLUCOMTR-MCNC: 205 MG/DL — HIGH (ref 70–99)
GLUCOSE BLDC GLUCOMTR-MCNC: 207 MG/DL — HIGH (ref 70–99)
GLUCOSE BLDC GLUCOMTR-MCNC: 239 MG/DL — HIGH (ref 70–99)
GLUCOSE BLDC GLUCOMTR-MCNC: 252 MG/DL — HIGH (ref 70–99)
GLUCOSE BLDC GLUCOMTR-MCNC: 254 MG/DL — HIGH (ref 70–99)
GLUCOSE BLDC GLUCOMTR-MCNC: 99 MG/DL — SIGNIFICANT CHANGE UP (ref 70–99)
GLUCOSE SERPL-MCNC: 115 MG/DL — HIGH (ref 70–99)
HCO3 BLDMV-SCNC: 27 MMOL/L — SIGNIFICANT CHANGE UP (ref 20–28)
HOROWITZ INDEX BLDMV+IHG-RTO: 50 — SIGNIFICANT CHANGE UP
MAGNESIUM SERPL-MCNC: 2.4 MG/DL — SIGNIFICANT CHANGE UP (ref 1.6–2.6)
O2 CT VFR BLD CALC: 36 MMHG — SIGNIFICANT CHANGE UP (ref 30–65)
O2 CT VFR BLD CALC: 36 MMHG — SIGNIFICANT CHANGE UP (ref 30–65)
O2 CT VFR BLD CALC: 37 MMHG — SIGNIFICANT CHANGE UP (ref 30–65)
O2 CT VFR BLD CALC: 43 MMHG — SIGNIFICANT CHANGE UP (ref 30–65)
PCO2 BLDMV: 44 MMHG — SIGNIFICANT CHANGE UP (ref 30–65)
PCO2 BLDMV: 44 MMHG — SIGNIFICANT CHANGE UP (ref 30–65)
PCO2 BLDMV: 45 MMHG — SIGNIFICANT CHANGE UP (ref 30–65)
PCO2 BLDMV: 45 MMHG — SIGNIFICANT CHANGE UP (ref 30–65)
PH BLDMV: 7.38 — SIGNIFICANT CHANGE UP (ref 7.32–7.45)
PH BLDMV: 7.39 — SIGNIFICANT CHANGE UP (ref 7.32–7.45)
PH BLDMV: 7.39 — SIGNIFICANT CHANGE UP (ref 7.32–7.45)
PH BLDMV: 7.4 — SIGNIFICANT CHANGE UP (ref 7.32–7.45)
PHOSPHATE SERPL-MCNC: 2.7 MG/DL — SIGNIFICANT CHANGE UP (ref 2.5–4.5)
PLATELET # BLD AUTO: 133 K/UL — LOW (ref 150–400)
POTASSIUM SERPL-MCNC: 4.4 MMOL/L — SIGNIFICANT CHANGE UP (ref 3.5–5.3)
POTASSIUM SERPL-SCNC: 4.4 MMOL/L — SIGNIFICANT CHANGE UP (ref 3.5–5.3)
PROT SERPL-MCNC: 5.1 G/DL — LOW (ref 6–8.3)
SAO2 % BLDMV: 57.7 — LOW (ref 60–90)
SAO2 % BLDMV: 61.3 — SIGNIFICANT CHANGE UP (ref 60–90)
SAO2 % BLDMV: 62.6 — SIGNIFICANT CHANGE UP (ref 60–90)
SAO2 % BLDMV: 72.9 — SIGNIFICANT CHANGE UP (ref 60–90)
SODIUM SERPL-SCNC: 135 MMOL/L — SIGNIFICANT CHANGE UP (ref 135–145)

## 2022-03-24 PROCEDURE — 99233 SBSQ HOSP IP/OBS HIGH 50: CPT | Mod: GC

## 2022-03-24 PROCEDURE — 71045 X-RAY EXAM CHEST 1 VIEW: CPT | Mod: 26

## 2022-03-24 PROCEDURE — 99291 CRITICAL CARE FIRST HOUR: CPT

## 2022-03-24 PROCEDURE — 99292 CRITICAL CARE ADDL 30 MIN: CPT | Mod: 24

## 2022-03-24 PROCEDURE — 93010 ELECTROCARDIOGRAM REPORT: CPT

## 2022-03-24 RX ORDER — ALBUMIN HUMAN 25 %
250 VIAL (ML) INTRAVENOUS ONCE
Refills: 0 | Status: COMPLETED | OUTPATIENT
Start: 2022-03-24 | End: 2022-03-24

## 2022-03-24 RX ORDER — ATORVASTATIN CALCIUM 80 MG/1
80 TABLET, FILM COATED ORAL AT BEDTIME
Refills: 0 | Status: DISCONTINUED | OUTPATIENT
Start: 2022-03-24 | End: 2022-03-30

## 2022-03-24 RX ORDER — FUROSEMIDE 40 MG
40 TABLET ORAL EVERY 12 HOURS
Refills: 0 | Status: DISCONTINUED | OUTPATIENT
Start: 2022-03-24 | End: 2022-03-26

## 2022-03-24 RX ORDER — POTASSIUM CHLORIDE 20 MEQ
10 PACKET (EA) ORAL
Refills: 0 | Status: COMPLETED | OUTPATIENT
Start: 2022-03-24 | End: 2022-03-24

## 2022-03-24 RX ORDER — HYDROMORPHONE HYDROCHLORIDE 2 MG/ML
0.5 INJECTION INTRAMUSCULAR; INTRAVENOUS; SUBCUTANEOUS ONCE
Refills: 0 | Status: DISCONTINUED | OUTPATIENT
Start: 2022-03-24 | End: 2022-03-24

## 2022-03-24 RX ORDER — DEXMEDETOMIDINE HYDROCHLORIDE IN 0.9% SODIUM CHLORIDE 4 UG/ML
0.2 INJECTION INTRAVENOUS
Qty: 200 | Refills: 0 | Status: DISCONTINUED | OUTPATIENT
Start: 2022-03-24 | End: 2022-03-26

## 2022-03-24 RX ORDER — HEPARIN SODIUM 5000 [USP'U]/ML
500 INJECTION INTRAVENOUS; SUBCUTANEOUS
Qty: 25000 | Refills: 0 | Status: DISCONTINUED | OUTPATIENT
Start: 2022-03-24 | End: 2022-03-25

## 2022-03-24 RX ADMIN — HYDROMORPHONE HYDROCHLORIDE 0.5 MILLIGRAM(S): 2 INJECTION INTRAMUSCULAR; INTRAVENOUS; SUBCUTANEOUS at 09:37

## 2022-03-24 RX ADMIN — Medication 650 MILLIGRAM(S): at 23:30

## 2022-03-24 RX ADMIN — ATORVASTATIN CALCIUM 80 MILLIGRAM(S): 80 TABLET, FILM COATED ORAL at 23:03

## 2022-03-24 RX ADMIN — HYDROMORPHONE HYDROCHLORIDE 0.5 MILLIGRAM(S): 2 INJECTION INTRAMUSCULAR; INTRAVENOUS; SUBCUTANEOUS at 13:15

## 2022-03-24 RX ADMIN — HYDROMORPHONE HYDROCHLORIDE 0.5 MILLIGRAM(S): 2 INJECTION INTRAMUSCULAR; INTRAVENOUS; SUBCUTANEOUS at 04:00

## 2022-03-24 RX ADMIN — Medication 650 MILLIGRAM(S): at 05:55

## 2022-03-24 RX ADMIN — Medication 81 MILLIGRAM(S): at 12:54

## 2022-03-24 RX ADMIN — INSULIN HUMAN 3 UNIT(S)/HR: 100 INJECTION, SOLUTION SUBCUTANEOUS at 18:28

## 2022-03-24 RX ADMIN — HYDROMORPHONE HYDROCHLORIDE 0.5 MILLIGRAM(S): 2 INJECTION INTRAMUSCULAR; INTRAVENOUS; SUBCUTANEOUS at 13:00

## 2022-03-24 RX ADMIN — Medication 40 MILLIGRAM(S): at 12:54

## 2022-03-24 RX ADMIN — GABAPENTIN 100 MILLIGRAM(S): 400 CAPSULE ORAL at 13:07

## 2022-03-24 RX ADMIN — Medication 650 MILLIGRAM(S): at 23:03

## 2022-03-24 RX ADMIN — MILRINONE LACTATE 10 MICROGRAM(S)/KG/MIN: 1 INJECTION, SOLUTION INTRAVENOUS at 08:31

## 2022-03-24 RX ADMIN — Medication 650 MILLIGRAM(S): at 12:54

## 2022-03-24 RX ADMIN — VASOPRESSIN 6 UNIT(S)/MIN: 20 INJECTION INTRAVENOUS at 08:31

## 2022-03-24 RX ADMIN — Medication 650 MILLIGRAM(S): at 18:00

## 2022-03-24 RX ADMIN — Medication 650 MILLIGRAM(S): at 18:30

## 2022-03-24 RX ADMIN — AMIODARONE HYDROCHLORIDE 400 MILLIGRAM(S): 400 TABLET ORAL at 18:00

## 2022-03-24 RX ADMIN — GABAPENTIN 100 MILLIGRAM(S): 400 CAPSULE ORAL at 05:43

## 2022-03-24 RX ADMIN — Medication 650 MILLIGRAM(S): at 05:43

## 2022-03-24 RX ADMIN — Medication 650 MILLIGRAM(S): at 13:15

## 2022-03-24 RX ADMIN — HYDROMORPHONE HYDROCHLORIDE 0.5 MILLIGRAM(S): 2 INJECTION INTRAMUSCULAR; INTRAVENOUS; SUBCUTANEOUS at 22:00

## 2022-03-24 RX ADMIN — HYDROMORPHONE HYDROCHLORIDE 0.5 MILLIGRAM(S): 2 INJECTION INTRAMUSCULAR; INTRAVENOUS; SUBCUTANEOUS at 10:45

## 2022-03-24 RX ADMIN — Medication 500 MILLIGRAM(S): at 05:43

## 2022-03-24 RX ADMIN — HEPARIN SODIUM 5 UNIT(S)/HR: 5000 INJECTION INTRAVENOUS; SUBCUTANEOUS at 08:25

## 2022-03-24 RX ADMIN — Medication 125 MILLILITER(S): at 03:30

## 2022-03-24 RX ADMIN — HYDROMORPHONE HYDROCHLORIDE 0.5 MILLIGRAM(S): 2 INJECTION INTRAMUSCULAR; INTRAVENOUS; SUBCUTANEOUS at 03:44

## 2022-03-24 RX ADMIN — Medication 50 MILLIEQUIVALENT(S): at 17:20

## 2022-03-24 RX ADMIN — MILRINONE LACTATE 10 MICROGRAM(S)/KG/MIN: 1 INJECTION, SOLUTION INTRAVENOUS at 10:30

## 2022-03-24 RX ADMIN — HYDROMORPHONE HYDROCHLORIDE 0.5 MILLIGRAM(S): 2 INJECTION INTRAMUSCULAR; INTRAVENOUS; SUBCUTANEOUS at 22:15

## 2022-03-24 RX ADMIN — Medication 100 MILLIGRAM(S): at 08:25

## 2022-03-24 RX ADMIN — Medication 100 MILLIGRAM(S): at 16:56

## 2022-03-24 RX ADMIN — GABAPENTIN 100 MILLIGRAM(S): 400 CAPSULE ORAL at 23:02

## 2022-03-24 RX ADMIN — INSULIN HUMAN 3 UNIT(S)/HR: 100 INJECTION, SOLUTION SUBCUTANEOUS at 08:30

## 2022-03-24 RX ADMIN — Medication 50 MILLIEQUIVALENT(S): at 15:30

## 2022-03-24 RX ADMIN — PANTOPRAZOLE SODIUM 40 MILLIGRAM(S): 20 TABLET, DELAYED RELEASE ORAL at 12:54

## 2022-03-24 RX ADMIN — SENNA PLUS 2 TABLET(S): 8.6 TABLET ORAL at 23:03

## 2022-03-24 RX ADMIN — Medication 3.76 MICROGRAM(S)/KG/MIN: at 08:30

## 2022-03-24 RX ADMIN — Medication 125 MILLILITER(S): at 02:15

## 2022-03-24 RX ADMIN — Medication 500 MILLIGRAM(S): at 18:00

## 2022-03-24 RX ADMIN — AMIODARONE HYDROCHLORIDE 400 MILLIGRAM(S): 400 TABLET ORAL at 05:43

## 2022-03-24 NOTE — PROGRESS NOTE ADULT - ATTENDING COMMENTS
Patient is very upset, he requested for us to leave the room.   He would like to be called Dr. Julian not Mr. Julian. He states he is nationally/internationally known and has very high ratings. He refers that he did not liked when we discussed with him possible perioperative complications. His family had to help to calm him.   Agree with slow inotrope weaning if hemodynamics allow. Needs aggressive diuresis. Tentative IABP weaning tomorrow if hemodynamics allow.   We will continue to see him as he allows us. Patient is very upset, he requested for us to leave the room.   He would like to be called Dr. Julian not Mr. Julian. He states he is nationally/internationally known and has very high ratings. He refers that he did not liked when we discussed with him possible perioperative complications. His family had to intervene and help calm him down.   Agree with slow inotrope weaning if hemodynamics allow. Needs aggressive diuresis. Tentative IABP weaning tomorrow if hemodynamics allow.   We will continue to see him as he allows us.

## 2022-03-24 NOTE — PROGRESS NOTE ADULT - SUBJECTIVE AND OBJECTIVE BOX
Patient seen and examined at bedside.    Overnight Events: no events, issues or complaints    Review of Systems:    [ ] All other systems negative  [x ] Unable to assess ROS due to patient refused    Current Meds:  acetaminophen     Tablet .. 650 milliGRAM(s) Oral every 6 hours  albumin human  5% IVPB 250 milliLiter(s) IV Intermittent once  aMIOdarone    Tablet 400 milliGRAM(s) Oral two times a day  ascorbic acid 500 milliGRAM(s) Oral two times a day  aspirin enteric coated 81 milliGRAM(s) Oral daily  atorvastatin 80 milliGRAM(s) Oral at bedtime  cefuroxime  IVPB 1500 milliGRAM(s) IV Intermittent every 8 hours  chlorhexidine 0.12% Liquid 5 milliLiter(s) Oral Mucosa two times a day  chlorhexidine 2% Cloths 1 Application(s) Topical daily  dextrose 50% Injectable 50 milliLiter(s) IV Push every 15 minutes  dextrose 50% Injectable 25 milliLiter(s) IV Push every 15 minutes  DOBUTamine Infusion 3 MICROgram(s)/kG/Min IV Continuous <Continuous>  furosemide   Injectable 40 milliGRAM(s) IV Push every 12 hours  gabapentin 100 milliGRAM(s) Oral every 8 hours  heparin  Infusion 500 Unit(s)/Hr IV Continuous <Continuous>  HYDROmorphone  Injectable 0.5 milliGRAM(s) IV Push every 6 hours PRN  insulin regular Infusion 3 Unit(s)/Hr IV Continuous <Continuous>  milrinone Infusion 0.4 MICROgram(s)/kG/Min IV Continuous <Continuous>  norepinephrine Infusion 0.05 MICROgram(s)/kG/Min IV Continuous <Continuous>  oxyCODONE    IR 5 milliGRAM(s) Oral every 4 hours PRN  oxyCODONE    IR 10 milliGRAM(s) Oral every 4 hours PRN  pantoprazole  Injectable 40 milliGRAM(s) IV Push daily  polyethylene glycol 3350 17 Gram(s) Oral daily  potassium chloride  10 mEq/50 mL IVPB 10 milliEquivalent(s) IV Intermittent every 1 hour  potassium chloride  10 mEq/50 mL IVPB 10 milliEquivalent(s) IV Intermittent every 1 hour  potassium chloride  10 mEq/50 mL IVPB 10 milliEquivalent(s) IV Intermittent every 1 hour  senna 2 Tablet(s) Oral at bedtime  sodium chloride 0.9%. 1000 milliLiter(s) IV Continuous <Continuous>  vasopressin Infusion 0.1 Unit(s)/Min IV Continuous <Continuous>      PAST MEDICAL & SURGICAL HISTORY:  No pertinent past medical history    No significant past surgical history        Vitals:  T(F): 98.8 (03-24), Max: 98.8 (03-23)  HR: 122 (03-24) (103 - 133)  BP: --  RR: 31 (03-24)  SpO2: 97% (03-24)  I&O's Summary    23 Mar 2022 07:01  -  24 Mar 2022 07:00  --------------------------------------------------------  IN: 2779.6 mL / OUT: 1635 mL / NET: 1144.6 mL    24 Mar 2022 07:01  -  24 Mar 2022 13:48  --------------------------------------------------------  IN: 650 mL / OUT: 255 mL / NET: 395 mL        Physical Exam CARLOS because patient refused                          9.0    9.71  )-----------( 133      ( 23 Mar 2022 23:04 )             26.0     03-24    135  |  100  |  13  ----------------------------<  115<H>  4.4   |  26  |  0.51    Ca    8.3<L>      24 Mar 2022 01:09  Phos  2.7     03-24  Mg     2.4     03-24    TPro  5.1<L>  /  Alb  3.4  /  TBili  0.7  /  DBili  x   /  AST  30  /  ALT  19  /  AlkPhos  54  03-24    PT/INR - ( 23 Mar 2022 15:20 )   PT: 18.6 sec;   INR: 1.60 ratio         PTT - ( 23 Mar 2022 15:20 )  PTT:32.7 sec  CARDIAC MARKERS ( 23 Mar 2022 15:20 )  x     / x     / 286 U/L / x     / 25.0 ng/mL      Serum Pro-Brain Natriuretic Peptide: 7087 pg/mL (03-20 @ 02:54)  Serum Pro-Brain Natriuretic Peptide: 6715 pg/mL (03-19 @ 17:08)             Patient seen and examined at bedside.    Overnight Events: no events, issues or complaints. Patient did not wanted us to see him. HE asked us to step out of the room.     Review of Systems:    [ ] All other systems negative  [x ] Unable to assess ROS due to patient refused    Current Meds:  acetaminophen     Tablet .. 650 milliGRAM(s) Oral every 6 hours  albumin human  5% IVPB 250 milliLiter(s) IV Intermittent once  aMIOdarone    Tablet 400 milliGRAM(s) Oral two times a day  ascorbic acid 500 milliGRAM(s) Oral two times a day  aspirin enteric coated 81 milliGRAM(s) Oral daily  atorvastatin 80 milliGRAM(s) Oral at bedtime  cefuroxime  IVPB 1500 milliGRAM(s) IV Intermittent every 8 hours  chlorhexidine 0.12% Liquid 5 milliLiter(s) Oral Mucosa two times a day  chlorhexidine 2% Cloths 1 Application(s) Topical daily  dextrose 50% Injectable 50 milliLiter(s) IV Push every 15 minutes  dextrose 50% Injectable 25 milliLiter(s) IV Push every 15 minutes  DOBUTamine Infusion 3 MICROgram(s)/kG/Min IV Continuous <Continuous>  furosemide   Injectable 40 milliGRAM(s) IV Push every 12 hours  gabapentin 100 milliGRAM(s) Oral every 8 hours  heparin  Infusion 500 Unit(s)/Hr IV Continuous <Continuous>  HYDROmorphone  Injectable 0.5 milliGRAM(s) IV Push every 6 hours PRN  insulin regular Infusion 3 Unit(s)/Hr IV Continuous <Continuous>  milrinone Infusion 0.4 MICROgram(s)/kG/Min IV Continuous <Continuous>  norepinephrine Infusion 0.05 MICROgram(s)/kG/Min IV Continuous <Continuous>  oxyCODONE    IR 5 milliGRAM(s) Oral every 4 hours PRN  oxyCODONE    IR 10 milliGRAM(s) Oral every 4 hours PRN  pantoprazole  Injectable 40 milliGRAM(s) IV Push daily  polyethylene glycol 3350 17 Gram(s) Oral daily  potassium chloride  10 mEq/50 mL IVPB 10 milliEquivalent(s) IV Intermittent every 1 hour  potassium chloride  10 mEq/50 mL IVPB 10 milliEquivalent(s) IV Intermittent every 1 hour  potassium chloride  10 mEq/50 mL IVPB 10 milliEquivalent(s) IV Intermittent every 1 hour  senna 2 Tablet(s) Oral at bedtime  sodium chloride 0.9%. 1000 milliLiter(s) IV Continuous <Continuous>  vasopressin Infusion 0.1 Unit(s)/Min IV Continuous <Continuous>      PAST MEDICAL & SURGICAL HISTORY:  No pertinent past medical history    No significant past surgical history        Vitals:  T(F): 98.8 (03-24), Max: 98.8 (03-23)  HR: 122 (03-24) (103 - 133)  BP: --  RR: 31 (03-24)  SpO2: 97% (03-24)  I&O's Summary    23 Mar 2022 07:01  -  24 Mar 2022 07:00  --------------------------------------------------------  IN: 2779.6 mL / OUT: 1635 mL / NET: 1144.6 mL    24 Mar 2022 07:01  -  24 Mar 2022 13:48  --------------------------------------------------------  IN: 650 mL / OUT: 255 mL / NET: 395 mL        Physical Exam CARLOS because patient refused                          9.0    9.71  )-----------( 133      ( 23 Mar 2022 23:04 )             26.0     03-24    135  |  100  |  13  ----------------------------<  115<H>  4.4   |  26  |  0.51    Ca    8.3<L>      24 Mar 2022 01:09  Phos  2.7     03-24  Mg     2.4     03-24    TPro  5.1<L>  /  Alb  3.4  /  TBili  0.7  /  DBili  x   /  AST  30  /  ALT  19  /  AlkPhos  54  03-24    PT/INR - ( 23 Mar 2022 15:20 )   PT: 18.6 sec;   INR: 1.60 ratio         PTT - ( 23 Mar 2022 15:20 )  PTT:32.7 sec  CARDIAC MARKERS ( 23 Mar 2022 15:20 )  x     / x     / 286 U/L / x     / 25.0 ng/mL      Serum Pro-Brain Natriuretic Peptide: 7087 pg/mL (03-20 @ 02:54)  Serum Pro-Brain Natriuretic Peptide: 6715 pg/mL (03-19 @ 17:08)

## 2022-03-24 NOTE — PROGRESS NOTE ADULT - PROBLEM SELECTOR PLAN 1
- Intermacs 2/SCAI stage C  - Continue hemodynamic monitoring with PA catheter  - Goals: CVP < 10, pcwp <15, CI > 2.2, UO > 50cc/h, lactate < 2.2   - Monitor perfusion labs daily: lactic acid, LFTs, SVo2  - tMCS: s/p IABP, on 1:1. Hep gtt for AC.   - Inotropes: wean off vasopressin and decrease  to 1.5; con't milrinone  - lasix 40mg IV BID, goal neg 1-2L  - Not a candidate for heart tx due to uncontrolled DM. Can consider LVAD if needed. Pt is very anxious and was not interested in discussing. We also asked our VAD coordinators to discuss and pt as well as his family refused. - Intermacs 2/SCAI stage C  - Continue hemodynamic monitoring with PA catheter  - Goals: CVP < 10, pcwp <15, CI > 2.2, UO > 50cc/h, lactate < 2.2   - Monitor perfusion labs daily: lactic acid, LFTs, SVo2  - tMCS: s/p IABP, on 1:1. Hep gtt for AC. Plan to wean tomorrow.   - Inotropes: wean off vasopressin and decrease  to 1.5; con't milrinone  - lasix 40mg IV BID, goal neg 1-2L  - Not a candidate for heart tx due to uncontrolled DM. Can consider LVAD if needed. Pt is very anxious and was not interested in discussing. We also asked our VAD coordinators to discuss and pt as well as his family refused.

## 2022-03-24 NOTE — PROGRESS NOTE ADULT - PROBLEM SELECTOR PLAN 2
- ACS: new LBBB+elevated cardiac biomarkers  - s/p 3V CABG on 3/23  - multivessel CAD  - continue ASA 81 mg daily  - continue lipitor 80 mg daily

## 2022-03-24 NOTE — PROGRESS NOTE ADULT - SUBJECTIVE AND OBJECTIVE BOX
CRITICAL CARE ATTENDING - CTICU    MEDICATIONS  (STANDING):  acetaminophen     Tablet .. 650 milliGRAM(s) Oral every 6 hours  albumin human  5% IVPB 250 milliLiter(s) IV Intermittent once  aMIOdarone    Tablet 400 milliGRAM(s) Oral two times a day  ascorbic acid 500 milliGRAM(s) Oral two times a day  aspirin enteric coated 81 milliGRAM(s) Oral daily  atorvastatin 80 milliGRAM(s) Oral at bedtime  cefuroxime  IVPB 1500 milliGRAM(s) IV Intermittent every 8 hours  chlorhexidine 0.12% Liquid 5 milliLiter(s) Oral Mucosa two times a day  chlorhexidine 2% Cloths 1 Application(s) Topical daily  dextrose 50% Injectable 50 milliLiter(s) IV Push every 15 minutes  dextrose 50% Injectable 25 milliLiter(s) IV Push every 15 minutes  DOBUTamine Infusion 3 MICROgram(s)/kG/Min (3.76 mL/Hr) IV Continuous <Continuous>  gabapentin 100 milliGRAM(s) Oral every 8 hours  heparin  Infusion 500 Unit(s)/Hr (5 mL/Hr) IV Continuous <Continuous>  insulin regular Infusion 3 Unit(s)/Hr (3 mL/Hr) IV Continuous <Continuous>  milrinone Infusion 0.4 MICROgram(s)/kG/Min (10 mL/Hr) IV Continuous <Continuous>  norepinephrine Infusion 0.05 MICROgram(s)/kG/Min (7.84 mL/Hr) IV Continuous <Continuous>  pantoprazole  Injectable 40 milliGRAM(s) IV Push daily  polyethylene glycol 3350 17 Gram(s) Oral daily  potassium chloride  10 mEq/50 mL IVPB 10 milliEquivalent(s) IV Intermittent every 1 hour  potassium chloride  10 mEq/50 mL IVPB 10 milliEquivalent(s) IV Intermittent every 1 hour  potassium chloride  10 mEq/50 mL IVPB 10 milliEquivalent(s) IV Intermittent every 1 hour  senna 2 Tablet(s) Oral at bedtime  sodium chloride 0.9%. 1000 milliLiter(s) (10 mL/Hr) IV Continuous <Continuous>  vasopressin Infusion 0.1 Unit(s)/Min (6 mL/Hr) IV Continuous <Continuous>                                    9.0    9.71  )-----------( 133      ( 23 Mar 2022 23:04 )             26.0       03-24    135  |  100  |  13  ----------------------------<  115<H>  4.4   |  26  |  0.51    Ca    8.3<L>      24 Mar 2022 01:09  Phos  2.7     -  Mg     2.4         TPro  5.1<L>  /  Alb  3.4  /  TBili  0.7  /  DBili  x   /  AST  30  /  ALT  19  /  AlkPhos  54        PT/INR - ( 23 Mar 2022 15:20 )   PT: 18.6 sec;   INR: 1.60 ratio         PTT - ( 23 Mar 2022 15:20 )  PTT:32.7 sec    Mode: CPAP with PS  FiO2: 50  PEEP: 5  PS: 5  MAP: 8  PIP: 20      Daily     Daily Weight in k.5 (24 Mar 2022 00:00)       @ 07:01  -   @ 07:00  --------------------------------------------------------  IN: 2779.6 mL / OUT: 1635 mL / NET: 1144.6 mL        Critically Ill patient  : [ x] preoperative ,   [ ] post operative    Requires :  [ x] Arterial Line   [ x] Central Line  [ x] PA catheter  [x ] IABP  [ ] ECMO  [ ] LVAD  [ ] Ventilator  [x ] pacemaker [ ] Impella - TPM  [x] HFO2                       [x ] ABG's     [ x] Pulse Oxymetry Monitoring  Bedside evaluation , monitoring , treatment of hemodynamics , fluids , IVP/ IVCD meds.        Diagnosis:     POD 1 - CABG     Multivessel CAD, post infract cardiogenic shock s/p IABP placement     Chest tube drainage     Hypoxemia - Requires [ ] BIPAP  [ x] HF O2     Requires chest PT, pulmonary toilet, suctioning to maintain SaO2,  patent airway and treat atelectasis.     [place] [facility] catheter interpretation and therapeutic management of unstable hemodynamics     IABP   [x ] management at 1:2 due to tachycardia    [ ] wean 1:1 1:2 1:3   [ ] removal and f/u vascular checks     CHF- acute [x ]   chronic [x ]    systolic [x ]   diatolic [ ]          - Echo- EF - 20 - 30 %            [ ] RV dysfunction          - Cxr-cardiomegally, edema          - Clinical-  [ x]inotropes   [x ]pressors   [ ]diuresis   [x ]IABP   [ ]ECMO   [ ]LVAD   [ ]Respiratory Failure    Hemodynamic lability,  instability. Requires IVCD [x ] vasopressors [x ] inotropes  [ ] vasodilator  [ ]IVSS fluid  to maintain MAP, perfusion, C.I.     IVCD anticoagulation with [ x] Heparin  [ ] Argatroban for  IABP circuit     Temporary pacemaker (TPM) interrogation and setting.     Hypotension     Cardiogenic Shock      Hypovolemia    IVCD insulin     Thrombocytopenia     Requires bedside physical therapy, mobilization and total skilled nursing care.           By signing my name below, I, Cheryl Jackson, attest that this documentation has been prepared under the direction and in the presence of Shorty Moon MD.   Electronically Signed: Evan Wilks 22 @ 08:31      Discussed with CT surgeon, Physician Assistant - Nurse Practitioner- Critical care medicine team.   Discussed at  AM / PM rounds.   Chart, labs , films reviewed.    Cumulative Critical Care Time Given Today:  CRITICAL CARE ATTENDING - CTICU    MEDICATIONS  (STANDING):  acetaminophen     Tablet .. 650 milliGRAM(s) Oral every 6 hours  albumin human  5% IVPB 250 milliLiter(s) IV Intermittent once  aMIOdarone    Tablet 400 milliGRAM(s) Oral two times a day  ascorbic acid 500 milliGRAM(s) Oral two times a day  aspirin enteric coated 81 milliGRAM(s) Oral daily  atorvastatin 80 milliGRAM(s) Oral at bedtime  cefuroxime  IVPB 1500 milliGRAM(s) IV Intermittent every 8 hours  chlorhexidine 0.12% Liquid 5 milliLiter(s) Oral Mucosa two times a day  chlorhexidine 2% Cloths 1 Application(s) Topical daily  dextrose 50% Injectable 50 milliLiter(s) IV Push every 15 minutes  dextrose 50% Injectable 25 milliLiter(s) IV Push every 15 minutes  DOBUTamine Infusion 3 MICROgram(s)/kG/Min (3.76 mL/Hr) IV Continuous <Continuous>  gabapentin 100 milliGRAM(s) Oral every 8 hours  heparin  Infusion 500 Unit(s)/Hr (5 mL/Hr) IV Continuous <Continuous>  insulin regular Infusion 3 Unit(s)/Hr (3 mL/Hr) IV Continuous <Continuous>  milrinone Infusion 0.4 MICROgram(s)/kG/Min (10 mL/Hr) IV Continuous <Continuous>  norepinephrine Infusion 0.05 MICROgram(s)/kG/Min (7.84 mL/Hr) IV Continuous <Continuous>  pantoprazole  Injectable 40 milliGRAM(s) IV Push daily  polyethylene glycol 3350 17 Gram(s) Oral daily  potassium chloride  10 mEq/50 mL IVPB 10 milliEquivalent(s) IV Intermittent every 1 hour  potassium chloride  10 mEq/50 mL IVPB 10 milliEquivalent(s) IV Intermittent every 1 hour  potassium chloride  10 mEq/50 mL IVPB 10 milliEquivalent(s) IV Intermittent every 1 hour  senna 2 Tablet(s) Oral at bedtime  sodium chloride 0.9%. 1000 milliLiter(s) (10 mL/Hr) IV Continuous <Continuous>  vasopressin Infusion 0.1 Unit(s)/Min (6 mL/Hr) IV Continuous <Continuous>                                    9.0    9.71  )-----------( 133      ( 23 Mar 2022 23:04 )             26.0       03-24    135  |  100  |  13  ----------------------------<  115<H>  4.4   |  26  |  0.51    Ca    8.3<L>      24 Mar 2022 01:09  Phos  2.7       Mg     2.4         TPro  5.1<L>  /  Alb  3.4  /  TBili  0.7  /  DBili  x   /  AST  30  /  ALT  19  /  AlkPhos  54        PT/INR - ( 23 Mar 2022 15:20 )   PT: 18.6 sec;   INR: 1.60 ratio         PTT - ( 23 Mar 2022 15:20 )  PTT:32.7 sec    Mode: CPAP with PS  FiO2: 50  PEEP: 5  PS: 5  MAP: 8  PIP: 20      Daily     Daily Weight in k.5 (24 Mar 2022 00:00)       @ 07:01  -   @ 07:00  --------------------------------------------------------  IN: 2779.6 mL / OUT: 1635 mL / NET: 1144.6 mL        Critically Ill patient  : [ x] preoperative ,   [ ] post operative    Requires :  [ x] Arterial Line   [ x] Central Line  [ x] PA catheter  [x ] IABP  [ ] ECMO  [ ] LVAD  [ ] Ventilator  [x ] pacemaker - TPM [ ] Impella   [x] HFO2                       [x ] ABG's     [ x] Pulse Oxymetry Monitoring  Bedside evaluation , monitoring , treatment of hemodynamics , fluids , IVP/ IVCD meds.        Diagnosis:     Admitted - NSTEMI    POD 1 - CABG     Cardiogenic Shock     Multivessel CAD, post infract cardiogenic shock s/p IABP placement     Chest tube drainage     Hypoxemia - Requires [ ] BIPAP  [ x] HF O2     Requires chest PT, pulmonary toilet, suctioning to maintain SaO2,  patent airway and treat atelectasis.     [place] [facility] catheter interpretation and therapeutic management of unstable hemodynamics     IABP   [x ] management at 1:2 due to tachycardia    [ ] wean 1:1 1:2 1:3   [ ] removal and f/u vascular checks     CHF- acute [x ]   chronic [x ]    systolic [x ]   diatolic [ ]          - Echo- EF - 20 - 30 %            [ ] RV dysfunction          - Cxr-cardiomegally, edema          - Clinical-  [ x]inotropes   [x ]pressors   [ ]diuresis   [x ]IABP   [ ]ECMO   [ ]LVAD   [ ]Respiratory Failure    Hemodynamic lability,  instability. Requires IVCD [x ] vasopressors [x ] inotropes  [ ] vasodilator  [ ]IVSS fluid  to maintain MAP, perfusion, C.I.     IVCD anticoagulation with [ x] Heparin  [ ] Argatroban for  IABP circuit     Temporary pacemaker (TPM) interrogation and setting.     Hypotension     Cardiogenic Shock      Hypovolemia    IVCD insulin     Thrombocytopenia     Requires bedside physical therapy, mobilization and total half-way care.           By signing my name below, I, Cheryl Jackson, attest that this documentation has been prepared under the direction and in the presence of Shorty Moon MD.   Electronically Signed: Evan Wilks 22 @ 08:31    I, Shorty Moon, personally performed the services described in this documentation. All medical record entries made by the scribe were at my direction and in my presence. I have reviewed the chart and agree that the record reflects my personal performance and is accurate and complete.   Shorty Moon MD.       Discussed with CT surgeon, Physician Assistant - Nurse Practitioner- Critical care medicine team.   Discussed at  AM / PM rounds.   Chart, labs , films reviewed.    Cumulative Critical Care Time Given Today:  45 min

## 2022-03-24 NOTE — PROGRESS NOTE ADULT - ASSESSMENT
67 y/o Male with no PMH presented to [facility] complaining of chest heaviness/pain since last night, associated with diaphoresis and emesis. EKG revealed new LBBB. Patient was transferred to [facility] and cardiac cath performed for NSTEMI which demonstrated severe multivessel CAD. Patient was in cardiogenic shock, and IABP was placed and Dobutamine drip was started. TTE demonstrated EF of 20-25%, RV size normal but decreased function. No LV thrombus. s/p IABP. Patient was transferred and admitted to CTU for further management. He is now s/p 3V CABG on 3/23 (LAD-LIMA, SVG-OM, SVG-PDA).     BNP 7087, Tn 3580   Cardiac studies:   LHC 3/19/2022: Multivessel CAD  Echo 3/19/2022: EF 20-25%, no LV thrombus, minimal MR, RV normal size with decreased function.     PAC 3/20/2022 AM: CO/CI 5.7/2.5; , PA 32/15/; CVP 5; MAP 70;   PAC 3/22/2022 AM: CO/CI 7.5/3.3; , PA 46/20/32, CVP 7, Wedge 22, PA Sat 73%, Aug , MAP 71,  on IABP 1:1,  1, Milrinone 0.4  PAC 3/24/2022 AM: CO/CI 6.7/3.3; SVR 1355, RA 18, PA 40/14/26 on IABP 1:1, milrinone 0.4, dobutamine 3, vasopressin 0.08

## 2022-03-24 NOTE — PROGRESS NOTE ADULT - TIME BILLING
- Review of notes/records, telemetry, vital signs and daily labs.   - General and cardiovascular physical examination.  - Generation of cardiovascular treatment plan.  - Coordination of care with primary team.
- labs, imaging and chart review  - cardiovascular physical examination  - formulation of plan and coordination with other teams
- Review of notes/records, telemetry, vital signs and daily labs.   - General and cardiovascular physical examination.  - Generation of cardiovascular treatment plan.  - Coordination of care with primary team.

## 2022-03-24 NOTE — PROGRESS NOTE ADULT - PA/NP ONLY VISIT
Comment: Sending in pre-op antibiotics prior to Mohs surgery due to joint replacement
Detail Level: Simple
Render Risk Assessment In Note?: no
PA/NP only visit
PA/NP only visit

## 2022-03-24 NOTE — PROGRESS NOTE ADULT - SUBJECTIVE AND OBJECTIVE BOX
Patient seen and examined at the bedside.    Remained critically ill on continuous ICU monitoring.    OBJECTIVE:  Vital Signs Last 24 Hrs  T(C): 37.1 (24 Mar 2022 16:00), Max: 37.1 (24 Mar 2022 12:00)  T(F): 98.8 (24 Mar 2022 16:00), Max: 98.8 (24 Mar 2022 12:00)  HR: 105 (24 Mar 2022 19:00) (103 - 122)  BP: --  BP(mean): --  RR: 25 (24 Mar 2022 19:00) (10 - 36)  SpO2: 97% (24 Mar 2022 19:00) (94% - 100%)      Physical Exam:   General: multiple lines gtt & tubes   Neurology: sedated   Eyes: bilateral pupils equal and reactive   ENT/Neck: +ETT midline, Neck supple, trachea midline, No JVD   Respiratory: Clear bilaterally   CV: S1S2, no murmurs        [x] Sternal dressing, [x] Mediastinal CT, [x] Pleural CT, [x] VALDO drain        [x] Sinus rhythm, [x] Afib, [x] Temporary pacing, [x] PPM  Abdominal: Soft, NT, ND +BS,   Extremities: 1-2+ pedal edema noted, + peripheral pulses   Skin: No Rashes, Hematoma, Ecchymosis                           Assessment:  67 y/o Male with no PMHX     Admitted STEMI w/ new LBBB /  Multivessel CAD / post infarct cardiogenic shock   S/P IABP placement for severe LV Systolic failure on 3/19   CAD s/p CABG with ROCK 3/23   Pulmonary edema   Stress hyperglycemia     Plan:   ***Neuro***  [x] Sedated with [x] Precedex   Post operative neuro assessment     ***Cardiovascular***  Invasive hemodynamic monitoring, assess perfusion indices   Sinus tach / CVP 14 / PAP 26 /  CI 3 /Hct 27% / Lactate 1.9  [x] Vasopressin [x] Dobutamine  [x] Levophed [x] Primacor   [x] IABP with good diastolic augmentation at 1:1   Intra op echo on 3/23 revealed EF 20%, thickened MV, severe segmental LV systolic dysfunction  Continue with amio for afib   Monitor RLE Perfusion & any bleeding complication  Pt with adequate MV, CI, and good peripheral perfusion   [x] AC therapy with heparin gtt, PTT 70-99  [x] ASA [x] Statin   Serial EKG and cardiac enzymes   P2Y12 adequate range for revascularization     ***Pulmonary***  [x] HFO2 40L/60%   Encourage incentive spirometry, continue pulse ox monitoring, follow ABGs             ***GI***  [x] Diet: Consistent carb diet   [x] Protonix  Bowel regimen with Senna     ***Renal***  Continue to monitor I/Os, BUN/Creatinine.   Replete lytes PRN  Jovel present [x] positive   Continue to diurese with IV Lasix    ***ID***  Perioperative coverage with Cefuroxime     ***Endocrine***  [x] Stress Hyperglycemia : HbA1c 11.4%               - [x] Insulin gtt               - Need tight glycemic control to prevent wound infection.        Patient requires continuous monitoring with bedside rhythm monitoring, pulse oximetry monitoring, and continuous central venous and arterial pressure monitoring; and intermittent blood gas analysis. Care plan discussed with the ICU care team.   Patient remained critical, at risk for life threatening decompensation.    I have spent 30 minutes providing critical care management to this patient.    By signing my name below, I, Kelli Beckman, attest that this documentation has been prepared under the direction and in the presence of ___  Electronically signed: Lionel Rasmussen, 03-24-22 @ 19:29    I, ___ , personally performed the services described in this documentation. all medical record entries made by the lionel were at my direction and in my presence. I have reviewed the chart and agree that the record reflects my personal performance and is accurate and complete  Electronically signed: ___ Patient seen and examined at the bedside.    Remained critically ill on continuous ICU monitoring.    OBJECTIVE:  Vital Signs Last 24 Hrs  T(C): 37.1 (24 Mar 2022 16:00), Max: 37.1 (24 Mar 2022 12:00)  T(F): 98.8 (24 Mar 2022 16:00), Max: 98.8 (24 Mar 2022 12:00)  HR: 105 (24 Mar 2022 19:00) (103 - 122)  BP: --  BP(mean): --  RR: 25 (24 Mar 2022 19:00) (10 - 36)  SpO2: 97% (24 Mar 2022 19:00) (94% - 100%)      Physical Exam:   General: multiple lines gtt & tubes   Neurology: sedated   Eyes: bilateral pupils equal and reactive   ENT/Neck: +ETT midline, Neck supple, trachea midline, No JVD   Respiratory: Clear bilaterally   CV: S1S2, no murmurs        [x] Sternal dressing, [x] Mediastinal CT, [x] Pleural CT, [x] VALDO drain        [x] Sinus rhythm, [x] Afib, [x] Temporary pacing, [x] PPM  Abdominal: Soft, NT, ND +BS,   Extremities: 1-2+ pedal edema noted, + peripheral pulses   Skin: No Rashes, Hematoma, Ecchymosis                           Assessment:  67 y/o Male with no PMHX     Admitted STEMI w/ new LBBB /  Multivessel CAD / post infarct cardiogenic shock   S/P IABP placement for severe LV Systolic failure on 3/19   CAD s/p CABG with ROCK 3/23   Pulmonary edema   Stress hyperglycemia     Plan:   ***Neuro***  [x] Sedated with [x] Precedex   Post operative neuro assessment     ***Cardiovascular***  Invasive hemodynamic monitoring, assess perfusion indices   Sinus tach / CVP 14 / PAP 26 /  CI 3 /Hct 27% / Lactate 1.9  [x] Vasopressin [x] Dobutamine  [x] Levophed [x] Primacor   [x] IABP with good diastolic augmentation at 1:1   Intra op echo on 3/23 revealed EF 20%, thickened MV, severe segmental LV systolic dysfunction  Continue with amio for afib   Monitor RLE Perfusion & any bleeding complication  Pt with adequate MV, CI, and good peripheral perfusion   [x] AC therapy with heparin gtt, PTT 70-99  [x] ASA [x] Statin   Serial EKG and cardiac enzymes   P2Y12 adequate range for revascularization     ***Pulmonary***  [x] HFO2 40L/60%   Encourage incentive spirometry, continue pulse ox monitoring, follow ABGs             ***GI***  [x] Diet: Consistent carb diet   [x] Protonix  Bowel regimen with Senna     ***Renal***  Continue to monitor I/Os, BUN/Creatinine.   Replete lytes PRN  Jovel present [x] positive   Continue to diurese with IV Lasix    ***ID***  Perioperative coverage with Cefuroxime     ***Endocrine***  [x] Stress Hyperglycemia : HbA1c 11.4%               - [x] Insulin gtt               - Need tight glycemic control to prevent wound infection.        Patient requires continuous monitoring with bedside rhythm monitoring, pulse oximetry monitoring, and continuous central venous and arterial pressure monitoring; and intermittent blood gas analysis. Care plan discussed with the ICU care team.   Patient remained critical, at risk for life threatening decompensation.    I have spent 30 minutes providing critical care management to this patient.    By signing my name below, I, Kelli Beckman, attest that this documentation has been prepared under the direction and in the presence of JAIME Morales.  Electronically signed: Evan Rasmussen, 03-24-22 @ 19:29    I, Fahad Gomez, personally performed the services described in this documentation. all medical record entries made by the pauloibmehnaz were at my direction and in my presence. I have reviewed the chart and agree that the record reflects my personal performance and is accurate and complete  Electronically signed: JAIME Morales. Patient seen and examined at the bedside.    Remained critically ill on continuous ICU monitoring.  Remains on Inotropic support, with IABP for decreased CO    OBJECTIVE:  Vital Signs Last 24 Hrs  T(C): 37.1 (24 Mar 2022 16:00), Max: 37.1 (24 Mar 2022 12:00)  T(F): 98.8 (24 Mar 2022 16:00), Max: 98.8 (24 Mar 2022 12:00)  HR: 105 (24 Mar 2022 19:00) (103 - 122)  BP: --  BP(mean): --  RR: 25 (24 Mar 2022 19:00) (10 - 36)  SpO2: 97% (24 Mar 2022 19:00) (94% - 100%)      Physical Exam:   General: multiple lines gtt & tubes, IABP via R groin  Neurology: awake and alert, on low dose precedex for anxiety  Eyes: bilateral pupils equal and reactive   ENT/Neck: +ETT midline, Neck supple, trachea midline, No JVD   Respiratory: Clear bilaterally   CV: S1S2, no murmurs        [x] Sternal dressing, [x] Mediastinal CT, [x] Pleural CT, [ ] VALDO drain        [x] Sinus rhythm, [ ] Afib, [x] Temporary pacing, [ ] PPM  Abdominal: Soft, NT, ND +BS,   Extremities: 1-2+ pedal edema noted, + peripheral pulses   Skin: No Rashes, Hematoma, Ecchymosis                           Assessment:  67 y/o Male with no PMHX     Admitted STEMI w/ new LBBB /  Multivessel CAD / post infarct cardiogenic shock   S/P IABP placement for severe LV Systolic failure on 3/19   CAD s/p CABG with ROCK 3/23   Pulmonary edema   Stress hyperglycemia     Plan:   ***Neuro***  [x] Sedated with [x] Precedex   Post operative neuro assessment     ***Cardiovascular***  Invasive hemodynamic monitoring, assess perfusion indices   Sinus tach / CVP 14 / PAP 26 /  CI 3 /Hct 27% / Lactate 1.9  [x] Vasopressin weaned off [x] Dobutamine weaned to off  [x] Levophed weaned to off [x] Primacor 0.4  [x] IABP with good diastolic augmentation at 1:1   Intra op echo on 3/23 revealed EF 20%, thickened MV, severe segmental LV systolic dysfunction  Continue with amio for afib   Monitor RLE Perfusion & any bleeding complication  Pt with adequate MV, CI, and good peripheral perfusion   [x] AC therapy with heparin gtt, PTT 70-99  [x] ASA [x] Statin   Serial EKG and cardiac enzymes       ***Pulmonary***  [x] HFO2 40L/60%   Encourage incentive spirometry, continue pulse ox monitoring, follow ABGs             ***GI***  [x] Diet: Consistent carb diet   [x] Protonix  Bowel regimen with Senna     ***Renal***  Continue to monitor I/Os, BUN/Creatinine.   Replete lytes PRN  Jovel present [x] positive   Continue to diurese with IV Lasix 40mg BID    ***ID***  Perioperative coverage with Cefuroxime     ***Endocrine***  [x] Stress Hyperglycemia : HbA1c 11.4%               - [x] Insulin gtt               - Need tight glycemic control to prevent wound infection.        Patient requires continuous monitoring with bedside rhythm monitoring, pulse oximetry monitoring, and continuous central venous and arterial pressure monitoring; and intermittent blood gas analysis. Care plan discussed with the ICU care team.   Patient remained critical, at risk for life threatening decompensation.    I have spent 30 minutes providing critical care management to this patient.    By signing my name below, I, Kelli Beckman, attest that this documentation has been prepared under the direction and in the presence of JAIME Morales.  Electronically signed: Lionel Rasmussen, 03-24-22 @ 19:29    I, Fahad Gomez, personally performed the services described in this documentation. all medical record entries made by the lionel were at my direction and in my presence. I have reviewed the chart and agree that the record reflects my personal performance and is accurate and complete  Electronically signed: JAIME Morales.

## 2022-03-25 DIAGNOSIS — E78.5 HYPERLIPIDEMIA, UNSPECIFIED: ICD-10-CM

## 2022-03-25 LAB
ALBUMIN SERPL ELPH-MCNC: 3.6 G/DL — SIGNIFICANT CHANGE UP (ref 3.3–5)
ALP SERPL-CCNC: 78 U/L — SIGNIFICANT CHANGE UP (ref 40–120)
ALT FLD-CCNC: 29 U/L — SIGNIFICANT CHANGE UP (ref 10–45)
ANION GAP SERPL CALC-SCNC: 12 MMOL/L — SIGNIFICANT CHANGE UP (ref 5–17)
APTT BLD: 36.8 SEC — HIGH (ref 27.5–35.5)
AST SERPL-CCNC: 25 U/L — SIGNIFICANT CHANGE UP (ref 10–40)
BASE EXCESS BLDMV CALC-SCNC: 0.8 MMOL/L — SIGNIFICANT CHANGE UP (ref -3–3)
BASE EXCESS BLDMV CALC-SCNC: 0.8 MMOL/L — SIGNIFICANT CHANGE UP (ref -3–3)
BASE EXCESS BLDMV CALC-SCNC: 1.3 MMOL/L — SIGNIFICANT CHANGE UP (ref -3–3)
BASE EXCESS BLDMV CALC-SCNC: 2.2 MMOL/L — SIGNIFICANT CHANGE UP (ref -3–3)
BASE EXCESS BLDV CALC-SCNC: -8.8 MMOL/L — LOW (ref -2–2)
BILIRUB SERPL-MCNC: 0.4 MG/DL — SIGNIFICANT CHANGE UP (ref 0.2–1.2)
BUN SERPL-MCNC: 18 MG/DL — SIGNIFICANT CHANGE UP (ref 7–23)
CALCIUM SERPL-MCNC: 8.7 MG/DL — SIGNIFICANT CHANGE UP (ref 8.4–10.5)
CHLORIDE SERPL-SCNC: 101 MMOL/L — SIGNIFICANT CHANGE UP (ref 96–108)
CO2 BLDMV-SCNC: 28 MMOL/L — SIGNIFICANT CHANGE UP (ref 21–29)
CO2 BLDMV-SCNC: 28 MMOL/L — SIGNIFICANT CHANGE UP (ref 21–29)
CO2 BLDMV-SCNC: 29 MMOL/L — SIGNIFICANT CHANGE UP (ref 21–29)
CO2 BLDMV-SCNC: 29 MMOL/L — SIGNIFICANT CHANGE UP (ref 21–29)
CO2 BLDV-SCNC: 16 MMOL/L — LOW (ref 22–26)
CO2 SERPL-SCNC: 25 MMOL/L — SIGNIFICANT CHANGE UP (ref 22–31)
CREAT SERPL-MCNC: 0.74 MG/DL — SIGNIFICANT CHANGE UP (ref 0.5–1.3)
CULTURE RESULTS: SIGNIFICANT CHANGE UP
EGFR: 100 ML/MIN/1.73M2 — SIGNIFICANT CHANGE UP
GAS PNL BLDA: SIGNIFICANT CHANGE UP
GAS PNL BLDMV: SIGNIFICANT CHANGE UP
GAS PNL BLDV: SIGNIFICANT CHANGE UP
GLUCOSE BLDC GLUCOMTR-MCNC: 109 MG/DL — HIGH (ref 70–99)
GLUCOSE BLDC GLUCOMTR-MCNC: 109 MG/DL — HIGH (ref 70–99)
GLUCOSE BLDC GLUCOMTR-MCNC: 113 MG/DL — HIGH (ref 70–99)
GLUCOSE BLDC GLUCOMTR-MCNC: 121 MG/DL — HIGH (ref 70–99)
GLUCOSE BLDC GLUCOMTR-MCNC: 123 MG/DL — HIGH (ref 70–99)
GLUCOSE BLDC GLUCOMTR-MCNC: 126 MG/DL — HIGH (ref 70–99)
GLUCOSE BLDC GLUCOMTR-MCNC: 127 MG/DL — HIGH (ref 70–99)
GLUCOSE BLDC GLUCOMTR-MCNC: 132 MG/DL — HIGH (ref 70–99)
GLUCOSE BLDC GLUCOMTR-MCNC: 136 MG/DL — HIGH (ref 70–99)
GLUCOSE BLDC GLUCOMTR-MCNC: 138 MG/DL — HIGH (ref 70–99)
GLUCOSE BLDC GLUCOMTR-MCNC: 152 MG/DL — HIGH (ref 70–99)
GLUCOSE BLDC GLUCOMTR-MCNC: 152 MG/DL — HIGH (ref 70–99)
GLUCOSE BLDC GLUCOMTR-MCNC: 163 MG/DL — HIGH (ref 70–99)
GLUCOSE BLDC GLUCOMTR-MCNC: 165 MG/DL — HIGH (ref 70–99)
GLUCOSE BLDC GLUCOMTR-MCNC: 167 MG/DL — HIGH (ref 70–99)
GLUCOSE BLDC GLUCOMTR-MCNC: 172 MG/DL — HIGH (ref 70–99)
GLUCOSE BLDC GLUCOMTR-MCNC: 175 MG/DL — HIGH (ref 70–99)
GLUCOSE BLDC GLUCOMTR-MCNC: 177 MG/DL — HIGH (ref 70–99)
GLUCOSE BLDC GLUCOMTR-MCNC: 183 MG/DL — HIGH (ref 70–99)
GLUCOSE BLDC GLUCOMTR-MCNC: 183 MG/DL — HIGH (ref 70–99)
GLUCOSE BLDC GLUCOMTR-MCNC: 187 MG/DL — HIGH (ref 70–99)
GLUCOSE BLDC GLUCOMTR-MCNC: 217 MG/DL — HIGH (ref 70–99)
GLUCOSE SERPL-MCNC: 127 MG/DL — HIGH (ref 70–99)
HCO3 BLDMV-SCNC: 27 MMOL/L — SIGNIFICANT CHANGE UP (ref 20–28)
HCO3 BLDV-SCNC: 16 MMOL/L — LOW (ref 22–29)
HCT VFR BLD CALC: 24.8 % — LOW (ref 39–50)
HGB BLD-MCNC: 8.2 G/DL — LOW (ref 13–17)
HOROWITZ INDEX BLDMV+IHG-RTO: 36 — SIGNIFICANT CHANGE UP
HOROWITZ INDEX BLDMV+IHG-RTO: 40 — SIGNIFICANT CHANGE UP
HOROWITZ INDEX BLDV+IHG-RTO: 36 — SIGNIFICANT CHANGE UP
MAGNESIUM SERPL-MCNC: 2.3 MG/DL — SIGNIFICANT CHANGE UP (ref 1.6–2.6)
MCHC RBC-ENTMCNC: 29 PG — SIGNIFICANT CHANGE UP (ref 27–34)
MCHC RBC-ENTMCNC: 33.1 GM/DL — SIGNIFICANT CHANGE UP (ref 32–36)
MCV RBC AUTO: 87.6 FL — SIGNIFICANT CHANGE UP (ref 80–100)
NRBC # BLD: 0 /100 WBCS — SIGNIFICANT CHANGE UP (ref 0–0)
O2 CT VFR BLD CALC: 37 MMHG — SIGNIFICANT CHANGE UP (ref 30–65)
O2 CT VFR BLD CALC: 38 MMHG — SIGNIFICANT CHANGE UP (ref 30–65)
O2 CT VFR BLD CALC: 39 MMHG — SIGNIFICANT CHANGE UP (ref 30–65)
O2 CT VFR BLD CALC: 40 MMHG — SIGNIFICANT CHANGE UP (ref 30–65)
PCO2 BLDMV: 43 MMHG — SIGNIFICANT CHANGE UP (ref 30–65)
PCO2 BLDMV: 46 MMHG — SIGNIFICANT CHANGE UP (ref 30–65)
PCO2 BLDMV: 46 MMHG — SIGNIFICANT CHANGE UP (ref 30–65)
PCO2 BLDMV: 47 MMHG — SIGNIFICANT CHANGE UP (ref 30–65)
PCO2 BLDV: 29 MMHG — LOW (ref 42–55)
PH BLDMV: 7.37 — SIGNIFICANT CHANGE UP (ref 7.32–7.45)
PH BLDMV: 7.41 — SIGNIFICANT CHANGE UP (ref 7.32–7.45)
PH BLDV: 7.34 — SIGNIFICANT CHANGE UP (ref 7.32–7.43)
PHOSPHATE SERPL-MCNC: 2.7 MG/DL — SIGNIFICANT CHANGE UP (ref 2.5–4.5)
PLATELET # BLD AUTO: 207 K/UL — SIGNIFICANT CHANGE UP (ref 150–400)
PO2 BLDV: 51 MMHG — HIGH (ref 25–45)
POTASSIUM BLDA-SCNC: 4 MMOL/L — SIGNIFICANT CHANGE UP (ref 3.5–5.1)
POTASSIUM SERPL-MCNC: 4.2 MMOL/L — SIGNIFICANT CHANGE UP (ref 3.5–5.3)
POTASSIUM SERPL-SCNC: 4.2 MMOL/L — SIGNIFICANT CHANGE UP (ref 3.5–5.3)
PROT SERPL-MCNC: 5.6 G/DL — LOW (ref 6–8.3)
RBC # BLD: 2.83 M/UL — LOW (ref 4.2–5.8)
RBC # FLD: 13.6 % — SIGNIFICANT CHANGE UP (ref 10.3–14.5)
SAO2 % BLDMV: 60.9 — SIGNIFICANT CHANGE UP (ref 60–90)
SAO2 % BLDMV: 65.3 — SIGNIFICANT CHANGE UP (ref 60–90)
SAO2 % BLDMV: 65.3 — SIGNIFICANT CHANGE UP (ref 60–90)
SAO2 % BLDMV: 67.7 — SIGNIFICANT CHANGE UP (ref 60–90)
SAO2 % BLDV: 83.4 % — SIGNIFICANT CHANGE UP (ref 67–88)
SODIUM SERPL-SCNC: 138 MMOL/L — SIGNIFICANT CHANGE UP (ref 135–145)
SPECIMEN SOURCE: SIGNIFICANT CHANGE UP
WBC # BLD: 19.31 K/UL — HIGH (ref 3.8–10.5)
WBC # FLD AUTO: 19.31 K/UL — HIGH (ref 3.8–10.5)

## 2022-03-25 PROCEDURE — 93010 ELECTROCARDIOGRAM REPORT: CPT

## 2022-03-25 PROCEDURE — 71045 X-RAY EXAM CHEST 1 VIEW: CPT | Mod: 26,77

## 2022-03-25 PROCEDURE — 99291 CRITICAL CARE FIRST HOUR: CPT

## 2022-03-25 PROCEDURE — 71045 X-RAY EXAM CHEST 1 VIEW: CPT | Mod: 26

## 2022-03-25 RX ORDER — HYDROMORPHONE HYDROCHLORIDE 2 MG/ML
0.5 INJECTION INTRAMUSCULAR; INTRAVENOUS; SUBCUTANEOUS ONCE
Refills: 0 | Status: DISCONTINUED | OUTPATIENT
Start: 2022-03-25 | End: 2022-03-25

## 2022-03-25 RX ORDER — INSULIN LISPRO 100/ML
VIAL (ML) SUBCUTANEOUS
Refills: 0 | Status: DISCONTINUED | OUTPATIENT
Start: 2022-03-25 | End: 2022-03-30

## 2022-03-25 RX ORDER — ENOXAPARIN SODIUM 100 MG/ML
40 INJECTION SUBCUTANEOUS ONCE
Refills: 0 | Status: COMPLETED | OUTPATIENT
Start: 2022-03-25 | End: 2022-03-25

## 2022-03-25 RX ORDER — SPIRONOLACTONE 25 MG/1
25 TABLET, FILM COATED ORAL DAILY
Refills: 0 | Status: DISCONTINUED | OUTPATIENT
Start: 2022-03-25 | End: 2022-03-28

## 2022-03-25 RX ORDER — DEXTROSE 50 % IN WATER 50 %
15 SYRINGE (ML) INTRAVENOUS ONCE
Refills: 0 | Status: DISCONTINUED | OUTPATIENT
Start: 2022-03-25 | End: 2022-03-30

## 2022-03-25 RX ORDER — ACETAMINOPHEN 500 MG
1000 TABLET ORAL ONCE
Refills: 0 | Status: COMPLETED | OUTPATIENT
Start: 2022-03-25 | End: 2022-03-25

## 2022-03-25 RX ORDER — OXYCODONE HYDROCHLORIDE 5 MG/1
5 TABLET ORAL ONCE
Refills: 0 | Status: DISCONTINUED | OUTPATIENT
Start: 2022-03-25 | End: 2022-03-25

## 2022-03-25 RX ORDER — INSULIN LISPRO 100/ML
VIAL (ML) SUBCUTANEOUS AT BEDTIME
Refills: 0 | Status: DISCONTINUED | OUTPATIENT
Start: 2022-03-25 | End: 2022-03-30

## 2022-03-25 RX ORDER — FUROSEMIDE 40 MG
40 TABLET ORAL ONCE
Refills: 0 | Status: COMPLETED | OUTPATIENT
Start: 2022-03-25 | End: 2022-03-25

## 2022-03-25 RX ORDER — DOBUTAMINE HCL 250MG/20ML
0.5 VIAL (ML) INTRAVENOUS
Qty: 500 | Refills: 0 | Status: DISCONTINUED | OUTPATIENT
Start: 2022-03-25 | End: 2022-03-28

## 2022-03-25 RX ORDER — INSULIN LISPRO 100/ML
9 VIAL (ML) SUBCUTANEOUS
Refills: 0 | Status: DISCONTINUED | OUTPATIENT
Start: 2022-03-25 | End: 2022-03-26

## 2022-03-25 RX ORDER — VASOPRESSIN 20 [USP'U]/ML
0.03 INJECTION INTRAVENOUS
Qty: 50 | Refills: 0 | Status: DISCONTINUED | OUTPATIENT
Start: 2022-03-25 | End: 2022-03-26

## 2022-03-25 RX ORDER — GLUCAGON INJECTION, SOLUTION 0.5 MG/.1ML
1 INJECTION, SOLUTION SUBCUTANEOUS ONCE
Refills: 0 | Status: DISCONTINUED | OUTPATIENT
Start: 2022-03-25 | End: 2022-03-30

## 2022-03-25 RX ORDER — INSULIN GLARGINE 100 [IU]/ML
30 INJECTION, SOLUTION SUBCUTANEOUS AT BEDTIME
Refills: 0 | Status: DISCONTINUED | OUTPATIENT
Start: 2022-03-25 | End: 2022-03-26

## 2022-03-25 RX ADMIN — OXYCODONE HYDROCHLORIDE 5 MILLIGRAM(S): 5 TABLET ORAL at 21:00

## 2022-03-25 RX ADMIN — Medication 100 MILLIGRAM(S): at 00:26

## 2022-03-25 RX ADMIN — Medication 500 MILLIGRAM(S): at 18:12

## 2022-03-25 RX ADMIN — Medication 40 MILLIGRAM(S): at 04:30

## 2022-03-25 RX ADMIN — AMIODARONE HYDROCHLORIDE 400 MILLIGRAM(S): 400 TABLET ORAL at 05:49

## 2022-03-25 RX ADMIN — GABAPENTIN 100 MILLIGRAM(S): 400 CAPSULE ORAL at 05:49

## 2022-03-25 RX ADMIN — HYDROMORPHONE HYDROCHLORIDE 0.5 MILLIGRAM(S): 2 INJECTION INTRAMUSCULAR; INTRAVENOUS; SUBCUTANEOUS at 02:46

## 2022-03-25 RX ADMIN — Medication 500 MILLIGRAM(S): at 05:49

## 2022-03-25 RX ADMIN — Medication 1000 MILLIGRAM(S): at 09:10

## 2022-03-25 RX ADMIN — INSULIN GLARGINE 30 UNIT(S): 100 INJECTION, SOLUTION SUBCUTANEOUS at 22:04

## 2022-03-25 RX ADMIN — SENNA PLUS 2 TABLET(S): 8.6 TABLET ORAL at 19:32

## 2022-03-25 RX ADMIN — PANTOPRAZOLE SODIUM 40 MILLIGRAM(S): 20 TABLET, DELAYED RELEASE ORAL at 12:07

## 2022-03-25 RX ADMIN — AMIODARONE HYDROCHLORIDE 400 MILLIGRAM(S): 400 TABLET ORAL at 18:12

## 2022-03-25 RX ADMIN — HYDROMORPHONE HYDROCHLORIDE 0.5 MILLIGRAM(S): 2 INJECTION INTRAMUSCULAR; INTRAVENOUS; SUBCUTANEOUS at 09:50

## 2022-03-25 RX ADMIN — Medication 400 MILLIGRAM(S): at 08:55

## 2022-03-25 RX ADMIN — GABAPENTIN 100 MILLIGRAM(S): 400 CAPSULE ORAL at 18:32

## 2022-03-25 RX ADMIN — Medication 650 MILLIGRAM(S): at 06:26

## 2022-03-25 RX ADMIN — OXYCODONE HYDROCHLORIDE 5 MILLIGRAM(S): 5 TABLET ORAL at 20:30

## 2022-03-25 RX ADMIN — CHLORHEXIDINE GLUCONATE 1 APPLICATION(S): 213 SOLUTION TOPICAL at 12:31

## 2022-03-25 RX ADMIN — Medication 81 MILLIGRAM(S): at 12:07

## 2022-03-25 RX ADMIN — POLYETHYLENE GLYCOL 3350 17 GRAM(S): 17 POWDER, FOR SOLUTION ORAL at 12:07

## 2022-03-25 RX ADMIN — HYDROMORPHONE HYDROCHLORIDE 0.5 MILLIGRAM(S): 2 INJECTION INTRAMUSCULAR; INTRAVENOUS; SUBCUTANEOUS at 03:00

## 2022-03-25 RX ADMIN — ATORVASTATIN CALCIUM 80 MILLIGRAM(S): 80 TABLET, FILM COATED ORAL at 20:30

## 2022-03-25 RX ADMIN — Medication 650 MILLIGRAM(S): at 05:48

## 2022-03-25 RX ADMIN — Medication 40 MILLIGRAM(S): at 18:12

## 2022-03-25 RX ADMIN — HYDROMORPHONE HYDROCHLORIDE 0.5 MILLIGRAM(S): 2 INJECTION INTRAMUSCULAR; INTRAVENOUS; SUBCUTANEOUS at 10:05

## 2022-03-25 RX ADMIN — Medication 650 MILLIGRAM(S): at 18:11

## 2022-03-25 RX ADMIN — Medication 650 MILLIGRAM(S): at 23:34

## 2022-03-25 NOTE — CHART NOTE - NSCHARTNOTEFT_GEN_A_CORE
PROCEDURE NOTE  REMOVAL OF INTRA AORTIC BALLOON PUMP    The IABP (intra-aortic balloon pump) was weaned according to protocol.  Hemodynamics remained stable.  Pulses in the right lower extreity are palpable The patient was placed in the supine position.  The insertion site was identified and the sutures were removed.  The IABP was turned off and the balloon deflated.  The IABP was then removed.  Direct pressure was applied for 45 minutes.  A sandbag was applied and is  to remain in place for three hours.    Monitoring of the right  groin and both lower extremities including neuro-vascular checks and vital signs every 15 minutes  x4, then every 30 minutes x 2, then every 1 hr x 4 was ordered.      Complications: none

## 2022-03-25 NOTE — CONSULT NOTE ADULT - PROBLEM SELECTOR RECOMMENDATION 2
On medications,  no chest pain, stable, monitored and followed up by primary cardiothoracic team/cardiology team.
- s/p IABP  - on milrinone and dobutamine   - continue to trend perfusion markers (LA 1.4)

## 2022-03-25 NOTE — CONSULT NOTE ADULT - SUBJECTIVE AND OBJECTIVE BOX
HPI:  65 y/o Male with no PMH presented to [place] complaining of chest heaviness/pain since last night, associated with diaphoresis and emesis. Patient took 324 ASA this morning. EKG revealed new LBBB. Patient was transferred to [facility], [facility] and cardiac cath performed for NSTEMI which demonstrated severe multivessel CAD. Patient was in cardiogenic shock, and IABP was placed and Dobutamine drip was started. TTE demonstrated EF of 20-25%, RV size normal but decreased function. No LV thrombus. Patient was transferred and admitted to CTU for further management under Dr. Abad. (19 Mar 2022 15:35)    Patient with newly dx diabetes, A1C 11.4% (reports A1C was 5% months ago, has had a lot of stress recently..), was not taking any hypoglycemic agents at home. Patient follows up with PCP for health management.  Endo was consulted for glycemic control.    PAST MEDICAL & SURGICAL HISTORY:  No pertinent past medical history    No significant past surgical history        FAMILY HISTORY:      Social History:    Outpatient Medications:    MEDICATIONS  (STANDING):  acetaminophen     Tablet .. 650 milliGRAM(s) Oral every 6 hours  aMIOdarone    Tablet 400 milliGRAM(s) Oral two times a day  ascorbic acid 500 milliGRAM(s) Oral two times a day  aspirin enteric coated 81 milliGRAM(s) Oral daily  atorvastatin 80 milliGRAM(s) Oral at bedtime  bisacodyl Suppository 10 milliGRAM(s) Rectal once  chlorhexidine 2% Cloths 1 Application(s) Topical daily  dexMEDEtomidine Infusion 0.2 MICROgram(s)/kG/Hr (4.18 mL/Hr) IV Continuous <Continuous>  dextrose 40% Gel 15 Gram(s) Oral once  dextrose 50% Injectable 50 milliLiter(s) IV Push every 15 minutes  DOBUTamine Infusion 1 MICROgram(s)/kG/Min (2.51 mL/Hr) IV Continuous <Continuous>  furosemide   Injectable 40 milliGRAM(s) IV Push every 12 hours  gabapentin 100 milliGRAM(s) Oral every 8 hours  glucagon  Injectable 1 milliGRAM(s) IntraMuscular once  heparin  Infusion 500 Unit(s)/Hr (8 mL/Hr) IV Continuous <Continuous>  insulin glargine Injectable (LANTUS) 30 Unit(s) SubCutaneous at bedtime  insulin lispro (ADMELOG) corrective regimen sliding scale   SubCutaneous three times a day before meals  insulin lispro (ADMELOG) corrective regimen sliding scale   SubCutaneous at bedtime  insulin lispro Injectable (ADMELOG) 9 Unit(s) SubCutaneous three times a day before meals  insulin regular Infusion 3 Unit(s)/Hr (3 mL/Hr) IV Continuous <Continuous>  milrinone Infusion 0.4 MICROgram(s)/kG/Min (10 mL/Hr) IV Continuous <Continuous>  pantoprazole  Injectable 40 milliGRAM(s) IV Push daily  polyethylene glycol 3350 17 Gram(s) Oral daily  senna 2 Tablet(s) Oral at bedtime  sodium chloride 0.9%. 1000 milliLiter(s) (10 mL/Hr) IV Continuous <Continuous>  spironolactone 25 milliGRAM(s) Oral daily  vasopressin Infusion 0.033 Unit(s)/Min (2 mL/Hr) IV Continuous <Continuous>    MEDICATIONS  (PRN):  oxyCODONE    IR 5 milliGRAM(s) Oral every 4 hours PRN Moderate Pain (4 - 6)  oxyCODONE    IR 10 milliGRAM(s) Oral every 4 hours PRN Severe Pain (7 - 10)      Allergies    No Known Allergies    Intolerances      Review of Systems:  Constitutional: No fever, no chills  Eyes: No blurry vision  Neuro: No tremors  HEENT: No pain, no neck swelling  Cardiovascular: No chest pain, no palpitations  Respiratory: Has SOB, no cough  GI: No nausea, vomiting, abdominal pain  : No dysuria  Skin: no rash  MSK: Has leg swelling.  Psych: no depression  Endocrine: no polyuria, polydipsia    ALL OTHER SYSTEMS REVIEWED AND NEGATIVE    UNABLE TO OBTAIN    PHYSICAL EXAM:  VITALS: T(C): 36.7 (03-25-22 @ 08:00)  T(F): 98.1 (03-25-22 @ 08:00), Max: 99 (03-24-22 @ 20:00)  HR: 112 (03-25-22 @ 11:00) (80 - 122)  BP: --  RR:  (14 - 37)  SpO2:  (88% - 100%)  Wt(kg): --  GENERAL: NAD, well-groomed, well-developed  EYES: No proptosis, no lid lag  HEENT:  Atraumatic, Normocephalic  THYROID: Normal size, no palpable nodules  RESPIRATORY: Clear to auscultation bilaterally; No rales, rhonchi, wheezing  CARDIOVASCULAR: Si S2, No murmurs;  GI: Soft, non distended, normal bowel sounds  SKIN: Dry, intact, No rashes or lesions  MUSCULOSKELETAL: Has BL lower extremity edema.  NEURO:  no tremor, sensation decreased in feet BL,    POCT Blood Glucose.: 217 mg/dL (03-25-22 @ 12:09)  POCT Blood Glucose.: 172 mg/dL (03-25-22 @ 11:02)  POCT Blood Glucose.: 175 mg/dL (03-25-22 @ 09:52)  POCT Blood Glucose.: 177 mg/dL (03-25-22 @ 08:57)  POCT Blood Glucose.: 132 mg/dL (03-25-22 @ 08:02)  POCT Blood Glucose.: 127 mg/dL (03-25-22 @ 06:42)  POCT Blood Glucose.: 126 mg/dL (03-25-22 @ 05:52)  POCT Blood Glucose.: 113 mg/dL (03-25-22 @ 05:04)  POCT Blood Glucose.: 121 mg/dL (03-25-22 @ 04:09)  POCT Blood Glucose.: 123 mg/dL (03-25-22 @ 02:58)  POCT Blood Glucose.: 109 mg/dL (03-25-22 @ 02:08)  POCT Blood Glucose.: 109 mg/dL (03-25-22 @ 01:15)  POCT Blood Glucose.: 136 mg/dL (03-25-22 @ 00:00)  POCT Blood Glucose.: 178 mg/dL (03-24-22 @ 22:57)  POCT Blood Glucose.: 182 mg/dL (03-24-22 @ 22:28)  POCT Blood Glucose.: 189 mg/dL (03-24-22 @ 20:01)  POCT Blood Glucose.: 172 mg/dL (03-24-22 @ 18:59)  POCT Blood Glucose.: 191 mg/dL (03-24-22 @ 18:03)  POCT Blood Glucose.: 207 mg/dL (03-24-22 @ 17:08)  POCT Blood Glucose.: 239 mg/dL (03-24-22 @ 16:02)  POCT Blood Glucose.: 252 mg/dL (03-24-22 @ 15:02)  POCT Blood Glucose.: 254 mg/dL (03-24-22 @ 14:30)  POCT Blood Glucose.: 171 mg/dL (03-24-22 @ 11:58)  POCT Blood Glucose.: 164 mg/dL (03-24-22 @ 11:09)  POCT Blood Glucose.: 174 mg/dL (03-24-22 @ 10:00)  POCT Blood Glucose.: 197 mg/dL (03-24-22 @ 08:59)  POCT Blood Glucose.: 190 mg/dL (03-24-22 @ 08:22)  POCT Blood Glucose.: 180 mg/dL (03-24-22 @ 06:49)  POCT Blood Glucose.: 205 mg/dL (03-24-22 @ 06:04)  POCT Blood Glucose.: 162 mg/dL (03-24-22 @ 04:55)  POCT Blood Glucose.: 172 mg/dL (03-24-22 @ 04:15)  POCT Blood Glucose.: 175 mg/dL (03-24-22 @ 03:08)  POCT Blood Glucose.: 120 mg/dL (03-24-22 @ 01:52)  POCT Blood Glucose.: 99 mg/dL (03-24-22 @ 01:21)  POCT Blood Glucose.: 119 mg/dL (03-23-22 @ 23:57)  POCT Blood Glucose.: 131 mg/dL (03-23-22 @ 22:50)  POCT Blood Glucose.: 140 mg/dL (03-23-22 @ 21:50)  POCT Blood Glucose.: 142 mg/dL (03-23-22 @ 21:28)  POCT Blood Glucose.: 169 mg/dL (03-23-22 @ 19:58)  POCT Blood Glucose.: 180 mg/dL (03-23-22 @ 18:39)  POCT Blood Glucose.: 178 mg/dL (03-23-22 @ 18:05)  POCT Blood Glucose.: 187 mg/dL (03-23-22 @ 17:19)  POCT Blood Glucose.: 186 mg/dL (03-23-22 @ 16:13)  POCT Blood Glucose.: 174 mg/dL (03-23-22 @ 15:00)  POCT Blood Glucose.: 123 mg/dL (03-23-22 @ 06:48)  POCT Blood Glucose.: 133 mg/dL (03-23-22 @ 05:53)  POCT Blood Glucose.: 146 mg/dL (03-23-22 @ 05:04)  POCT Blood Glucose.: 160 mg/dL (03-23-22 @ 02:54)  POCT Blood Glucose.: 159 mg/dL (03-23-22 @ 01:55)  POCT Blood Glucose.: 135 mg/dL (03-23-22 @ 01:13)  POCT Blood Glucose.: 114 mg/dL (03-22-22 @ 23:18)  POCT Blood Glucose.: 123 mg/dL (03-22-22 @ 23:09)  POCT Blood Glucose.: 181 mg/dL (03-22-22 @ 20:58)  POCT Blood Glucose.: 221 mg/dL (03-22-22 @ 19:53)  POCT Blood Glucose.: 231 mg/dL (03-22-22 @ 18:55)  POCT Blood Glucose.: 181 mg/dL (03-22-22 @ 17:56)  POCT Blood Glucose.: 118 mg/dL (03-22-22 @ 16:10)  POCT Blood Glucose.: 136 mg/dL (03-22-22 @ 15:04)  POCT Blood Glucose.: 148 mg/dL (03-22-22 @ 14:09)                            8.2    19.31 )-----------( 207      ( 25 Mar 2022 00:22 )             24.8       03-25    138  |  101  |  18  ----------------------------<  127<H>  4.2   |  25  |  0.74    EGFR if : x   EGFR if non : x     Ca    8.7      03-25  Mg     2.3     03-25  Phos  2.7     03-25    TPro  5.6<L>  /  Alb  3.6  /  TBili  0.4  /  DBili  x   /  AST  25  /  ALT  29  /  AlkPhos  78  03-25      Thyroid Function Tests:  03-19 @ 20:06 TSH 0.78 FreeT4 -- T3 -- Anti TPO -- Anti Thyroglobulin Ab -- TSI --              Radiology:              HPI:  65 y/o Male with no PMH presented to [place] complaining of chest heaviness/pain since last night, associated with diaphoresis and emesis. Patient took 324 ASA this morning. EKG revealed new LBBB. Patient was transferred to [facility] and cardiac cath performed for NSTEMI which demonstrated severe multivessel CAD. Patient was in cardiogenic shock, and IABP was placed and Dobutamine drip was started. TTE demonstrated EF of 20-25%, RV size normal but decreased function. No LV thrombus. Patient was transferred and admitted to CTU for further management under Dr. Abad. (19 Mar 2022 15:35)    Patient with newly dx diabetes, A1C 11.4% (reports A1C was 5% months ago, has had a lot of stress recently..), was not taking any hypoglycemic agents at home. Patient follows up with PCP for health management.  Endo was consulted for glycemic control.    PAST MEDICAL & SURGICAL HISTORY:  No pertinent past medical history    No significant past surgical history        FAMILY HISTORY:      Social History:    Outpatient Medications:    MEDICATIONS  (STANDING):  acetaminophen     Tablet .. 650 milliGRAM(s) Oral every 6 hours  aMIOdarone    Tablet 400 milliGRAM(s) Oral two times a day  ascorbic acid 500 milliGRAM(s) Oral two times a day  aspirin enteric coated 81 milliGRAM(s) Oral daily  atorvastatin 80 milliGRAM(s) Oral at bedtime  bisacodyl Suppository 10 milliGRAM(s) Rectal once  chlorhexidine 2% Cloths 1 Application(s) Topical daily  dexMEDEtomidine Infusion 0.2 MICROgram(s)/kG/Hr (4.18 mL/Hr) IV Continuous <Continuous>  dextrose 40% Gel 15 Gram(s) Oral once  dextrose 50% Injectable 50 milliLiter(s) IV Push every 15 minutes  DOBUTamine Infusion 1 MICROgram(s)/kG/Min (2.51 mL/Hr) IV Continuous <Continuous>  furosemide   Injectable 40 milliGRAM(s) IV Push every 12 hours  gabapentin 100 milliGRAM(s) Oral every 8 hours  glucagon  Injectable 1 milliGRAM(s) IntraMuscular once  heparin  Infusion 500 Unit(s)/Hr (8 mL/Hr) IV Continuous <Continuous>  insulin glargine Injectable (LANTUS) 30 Unit(s) SubCutaneous at bedtime  insulin lispro (ADMELOG) corrective regimen sliding scale   SubCutaneous three times a day before meals  insulin lispro (ADMELOG) corrective regimen sliding scale   SubCutaneous at bedtime  insulin lispro Injectable (ADMELOG) 9 Unit(s) SubCutaneous three times a day before meals  insulin regular Infusion 3 Unit(s)/Hr (3 mL/Hr) IV Continuous <Continuous>  milrinone Infusion 0.4 MICROgram(s)/kG/Min (10 mL/Hr) IV Continuous <Continuous>  pantoprazole  Injectable 40 milliGRAM(s) IV Push daily  polyethylene glycol 3350 17 Gram(s) Oral daily  senna 2 Tablet(s) Oral at bedtime  sodium chloride 0.9%. 1000 milliLiter(s) (10 mL/Hr) IV Continuous <Continuous>  spironolactone 25 milliGRAM(s) Oral daily  vasopressin Infusion 0.033 Unit(s)/Min (2 mL/Hr) IV Continuous <Continuous>    MEDICATIONS  (PRN):  oxyCODONE    IR 5 milliGRAM(s) Oral every 4 hours PRN Moderate Pain (4 - 6)  oxyCODONE    IR 10 milliGRAM(s) Oral every 4 hours PRN Severe Pain (7 - 10)      Allergies    No Known Allergies    Intolerances      Review of Systems:  Constitutional: No fever, no chills  Eyes: No blurry vision  Neuro: No tremors  HEENT: No pain, no neck swelling  Cardiovascular: No chest pain, no palpitations  Respiratory: Has SOB, no cough  GI: No nausea, vomiting, abdominal pain  : No dysuria  Skin: no rash  MSK: Has leg swelling.  Psych: no depression  Endocrine: no polyuria, polydipsia    ALL OTHER SYSTEMS REVIEWED AND NEGATIVE    UNABLE TO OBTAIN    PHYSICAL EXAM:  VITALS: T(C): 36.7 (03-25-22 @ 08:00)  T(F): 98.1 (03-25-22 @ 08:00), Max: 99 (03-24-22 @ 20:00)  HR: 112 (03-25-22 @ 11:00) (80 - 122)  BP: --  RR:  (14 - 37)  SpO2:  (88% - 100%)  Wt(kg): --  GENERAL: NAD, well-groomed, well-developed  EYES: No proptosis, no lid lag  HEENT:  Atraumatic, Normocephalic  THYROID: Normal size, no palpable nodules  RESPIRATORY: Clear to auscultation bilaterally; No rales, rhonchi, wheezing  CARDIOVASCULAR: Si S2, No murmurs;  GI: Soft, non distended, normal bowel sounds  SKIN: Dry, intact, No rashes or lesions  MUSCULOSKELETAL: Has BL lower extremity edema.  NEURO:  no tremor, sensation decreased in feet BL,    POCT Blood Glucose.: 217 mg/dL (03-25-22 @ 12:09)  POCT Blood Glucose.: 172 mg/dL (03-25-22 @ 11:02)  POCT Blood Glucose.: 175 mg/dL (03-25-22 @ 09:52)  POCT Blood Glucose.: 177 mg/dL (03-25-22 @ 08:57)  POCT Blood Glucose.: 132 mg/dL (03-25-22 @ 08:02)  POCT Blood Glucose.: 127 mg/dL (03-25-22 @ 06:42)  POCT Blood Glucose.: 126 mg/dL (03-25-22 @ 05:52)  POCT Blood Glucose.: 113 mg/dL (03-25-22 @ 05:04)  POCT Blood Glucose.: 121 mg/dL (03-25-22 @ 04:09)  POCT Blood Glucose.: 123 mg/dL (03-25-22 @ 02:58)  POCT Blood Glucose.: 109 mg/dL (03-25-22 @ 02:08)  POCT Blood Glucose.: 109 mg/dL (03-25-22 @ 01:15)  POCT Blood Glucose.: 136 mg/dL (03-25-22 @ 00:00)  POCT Blood Glucose.: 178 mg/dL (03-24-22 @ 22:57)  POCT Blood Glucose.: 182 mg/dL (03-24-22 @ 22:28)  POCT Blood Glucose.: 189 mg/dL (03-24-22 @ 20:01)  POCT Blood Glucose.: 172 mg/dL (03-24-22 @ 18:59)  POCT Blood Glucose.: 191 mg/dL (03-24-22 @ 18:03)  POCT Blood Glucose.: 207 mg/dL (03-24-22 @ 17:08)  POCT Blood Glucose.: 239 mg/dL (03-24-22 @ 16:02)  POCT Blood Glucose.: 252 mg/dL (03-24-22 @ 15:02)  POCT Blood Glucose.: 254 mg/dL (03-24-22 @ 14:30)  POCT Blood Glucose.: 171 mg/dL (03-24-22 @ 11:58)  POCT Blood Glucose.: 164 mg/dL (03-24-22 @ 11:09)  POCT Blood Glucose.: 174 mg/dL (03-24-22 @ 10:00)  POCT Blood Glucose.: 197 mg/dL (03-24-22 @ 08:59)  POCT Blood Glucose.: 190 mg/dL (03-24-22 @ 08:22)  POCT Blood Glucose.: 180 mg/dL (03-24-22 @ 06:49)  POCT Blood Glucose.: 205 mg/dL (03-24-22 @ 06:04)  POCT Blood Glucose.: 162 mg/dL (03-24-22 @ 04:55)  POCT Blood Glucose.: 172 mg/dL (03-24-22 @ 04:15)  POCT Blood Glucose.: 175 mg/dL (03-24-22 @ 03:08)  POCT Blood Glucose.: 120 mg/dL (03-24-22 @ 01:52)  POCT Blood Glucose.: 99 mg/dL (03-24-22 @ 01:21)  POCT Blood Glucose.: 119 mg/dL (03-23-22 @ 23:57)  POCT Blood Glucose.: 131 mg/dL (03-23-22 @ 22:50)  POCT Blood Glucose.: 140 mg/dL (03-23-22 @ 21:50)  POCT Blood Glucose.: 142 mg/dL (03-23-22 @ 21:28)  POCT Blood Glucose.: 169 mg/dL (03-23-22 @ 19:58)  POCT Blood Glucose.: 180 mg/dL (03-23-22 @ 18:39)  POCT Blood Glucose.: 178 mg/dL (03-23-22 @ 18:05)  POCT Blood Glucose.: 187 mg/dL (03-23-22 @ 17:19)  POCT Blood Glucose.: 186 mg/dL (03-23-22 @ 16:13)  POCT Blood Glucose.: 174 mg/dL (03-23-22 @ 15:00)  POCT Blood Glucose.: 123 mg/dL (03-23-22 @ 06:48)  POCT Blood Glucose.: 133 mg/dL (03-23-22 @ 05:53)  POCT Blood Glucose.: 146 mg/dL (03-23-22 @ 05:04)  POCT Blood Glucose.: 160 mg/dL (03-23-22 @ 02:54)  POCT Blood Glucose.: 159 mg/dL (03-23-22 @ 01:55)  POCT Blood Glucose.: 135 mg/dL (03-23-22 @ 01:13)  POCT Blood Glucose.: 114 mg/dL (03-22-22 @ 23:18)  POCT Blood Glucose.: 123 mg/dL (03-22-22 @ 23:09)  POCT Blood Glucose.: 181 mg/dL (03-22-22 @ 20:58)  POCT Blood Glucose.: 221 mg/dL (03-22-22 @ 19:53)  POCT Blood Glucose.: 231 mg/dL (03-22-22 @ 18:55)  POCT Blood Glucose.: 181 mg/dL (03-22-22 @ 17:56)  POCT Blood Glucose.: 118 mg/dL (03-22-22 @ 16:10)  POCT Blood Glucose.: 136 mg/dL (03-22-22 @ 15:04)  POCT Blood Glucose.: 148 mg/dL (03-22-22 @ 14:09)                            8.2    19.31 )-----------( 207      ( 25 Mar 2022 00:22 )             24.8       03-25    138  |  101  |  18  ----------------------------<  127<H>  4.2   |  25  |  0.74    EGFR if : x   EGFR if non : x     Ca    8.7      03-25  Mg     2.3     03-25  Phos  2.7     03-25    TPro  5.6<L>  /  Alb  3.6  /  TBili  0.4  /  DBili  x   /  AST  25  /  ALT  29  /  AlkPhos  78  03-25      Thyroid Function Tests:  03-19 @ 20:06 TSH 0.78 FreeT4 -- T3 -- Anti TPO -- Anti Thyroglobulin Ab -- TSI --              Radiology:

## 2022-03-25 NOTE — CONSULT NOTE ADULT - PROBLEM SELECTOR RECOMMENDATION 4
Will continue statin, primary team FU.
- continue ASA 81 mg daily  - continue heparin drip, monitor PTT  - continue statin  - s/p IABP

## 2022-03-25 NOTE — CONSULT NOTE ADULT - PROBLEM SELECTOR RECOMMENDATION 3
Suggest to continue medications, monitoring, FU primary team recommendations.
- pulmonary edema present on admission  - continue lasix gtt @ 5 mg/h  - Monitor electrolytes, BUN/Cr

## 2022-03-25 NOTE — CONSULT NOTE ADULT - PROBLEM SELECTOR RECOMMENDATION 9
- continue ASA 81 mg daily  - continue lipitor 80 mg daily  - CT sx for revascularization
Suggest to continue IV insulin for now for tight glycemic control.  Will start Lantus 30u at bedtime.  Will start Admelog 9u before each meal as well as Admelog correction scale coverage. Will continue monitoring FS and FU.   for compliance with consistent low-carb diet.

## 2022-03-25 NOTE — CONSULT NOTE ADULT - ASSESSMENT
Assessment  DMT2: 66y Male with newly dx DM T2 with hyperglycemia, A1C 11.4%, was not taking any hypoglycemic agents, now postop on IV insulin, blood sugars trending up and not at target, no hypoglycemic episodes, eating meals, NAD.  CAD: s/p CABG, on medications, no chest pain, stable, monitored.  Cardiogenic shock: On meds, improving, monitored..  HLD: On statin.      Shoaib Montaño MD  Cell: [phone]  Office: [phone]       Assessment  DMT2: 66y Male with newly dx DM T2 with hyperglycemia, A1C 11.4%, was not taking any hypoglycemic agents, now postop on IV insulin, blood sugars trending up and not at target, no hypoglycemic  episodes, eating meals, NAD.  CAD: s/p CABG, on medications, no chest pain, stable, monitored.  Cardiogenic shock: On meds, improving, monitored..  HLD: On statin.      Shoaib Montaño MD  Cell: [phone]  Office: [phone]

## 2022-03-25 NOTE — PROGRESS NOTE ADULT - SUBJECTIVE AND OBJECTIVE BOX
CRITICAL CARE ATTENDING - CTICU    MEDICATIONS  (STANDING):  acetaminophen     Tablet .. 650 milliGRAM(s) Oral every 6 hours  aMIOdarone    Tablet 400 milliGRAM(s) Oral two times a day  ascorbic acid 500 milliGRAM(s) Oral two times a day  aspirin enteric coated 81 milliGRAM(s) Oral daily  atorvastatin 80 milliGRAM(s) Oral at bedtime  bisacodyl Suppository 10 milliGRAM(s) Rectal once  chlorhexidine 2% Cloths 1 Application(s) Topical daily  dexMEDEtomidine Infusion 0.2 MICROgram(s)/kG/Hr (4.18 mL/Hr) IV Continuous <Continuous>  dextrose 50% Injectable 50 milliLiter(s) IV Push every 15 minutes  DOBUTamine Infusion 1 MICROgram(s)/kG/Min (2.51 mL/Hr) IV Continuous <Continuous>  furosemide   Injectable 40 milliGRAM(s) IV Push every 12 hours  gabapentin 100 milliGRAM(s) Oral every 8 hours  heparin  Infusion 500 Unit(s)/Hr (8 mL/Hr) IV Continuous <Continuous>  insulin regular Infusion 3 Unit(s)/Hr (3 mL/Hr) IV Continuous <Continuous>  milrinone Infusion 0.4 MICROgram(s)/kG/Min (10 mL/Hr) IV Continuous <Continuous>  pantoprazole  Injectable 40 milliGRAM(s) IV Push daily  polyethylene glycol 3350 17 Gram(s) Oral daily  senna 2 Tablet(s) Oral at bedtime  sodium chloride 0.9%. 1000 milliLiter(s) (10 mL/Hr) IV Continuous <Continuous>  vasopressin Infusion 0.033 Unit(s)/Min (2 mL/Hr) IV Continuous <Continuous>                                    8.2    19.31 )-----------( 207      ( 25 Mar 2022 00:22 )             24.8       03-25    138  |  101  |  18  ----------------------------<  127<H>  4.2   |  25  |  0.74    Ca    8.7      25 Mar 2022 00:23  Phos  2.7     03-25  Mg     2.3     03-25    TPro  5.6<L>  /  Alb  3.6  /  TBili  0.4  /  DBili  x   /  AST  25  /  ALT  29  /  AlkPhos  78  03-25      PT/INR - ( 23 Mar 2022 15:20 )   PT: 18.6 sec;   INR: 1.60 ratio         PTT - ( 25 Mar 2022 05:57 )  PTT:36.8 sec        Daily     Daily Weight in k (25 Mar 2022 00:00)       @ 07:  -   @ 07:00  --------------------------------------------------------  IN: 2779.6 mL / OUT: 1635 mL / NET: 1144.6 mL     @ 07:  -   @ 06:33  --------------------------------------------------------  IN: 1819.8 mL / OUT: 3050 mL / NET: -1230.2 mL      Critically Ill patient  : [ x] preoperative ,   [ ] post operative    Requires :  [ x] Arterial Line   [ x] Central Line  [ x] PA catheter  [x ] IABP  [ ] ECMO  [ ] LVAD  [ ] Ventilator  [x ] pacemaker - TPM [ ] Impella   [x] HFO2                       [x ] ABG's     [ x] Pulse Oxymetry Monitoring  Bedside evaluation , monitoring , treatment of hemodynamics , fluids , IVP/ IVCD meds.        Diagnosis:     Admitted - NSTEMI    POD 2 - CABG     Multivessel CAD, post infract cardiogenic shock s/p IABP placement     Chest tube drainage     Hypoxemia - Requires [ ] BIPAP  [ x] HF O2     Requires chest PT, pulmonary toilet, suctioning to maintain SaO2,  patent airway and treat atelectasis.     Olney Horacio catheter interpretation and therapeutic management of unstable hemodynamics     IABP   [x ] management at 1:2 due to tachycardia    [ ] wean 1:1 1:2 1:3   [ ] removal and f/u vascular checks     CHF- acute [x ]   chronic [x ]    systolic [x ]   diatolic [ ]          - Echo- EF - 20 - 30 %            [ ] RV dysfunction          - Cxr-cardiomegally, edema          - Clinical-  [ x]inotropes   [x ]pressors   [ ]diuresis   [x ]IABP   [ ]ECMO   [ ]LVAD   [ ]Respiratory Failure    Hemodynamic lability,  instability. Requires IVCD [x ] vasopressors [x ] inotropes  [ ] vasodilator  [ ]IVSS fluid  to maintain MAP, perfusion, C.I.     IVCD anticoagulation with [ x] Heparin  [ ] Argatroban for  IABP circuit     Temporary pacemaker (TPM) interrogation and setting.     Hypotension     Cardiogenic Shock      Hypovolemia    IVCD insulin     Thrombocytopenia     Requires bedside physical therapy, mobilization and total senior care care.             IShorty, personally performed the services described in this documentation. All medical record entries made by the pauloibmehnaz were at my direction and in my presence. I have reviewed the chart and agree that the record reflects my personal performance and is accurate and complete.   Shorty Moon MD.       By signing my name below, I, Maday Elam, attest that this documentation has been prepared under the direction and in the presence of Shorty Moon MD.   Electronically Signed: Maday Elam Scribe 22 @ 06:33        Discussed with CT surgeon, Physician Assistant - Nurse Practitioner- Critical care medicine team.   Dicussed at  AM / PM rounds.   Chart, labs , films reviewed.    Cumulative Critical Care Time Given Today:  CRITICAL CARE ATTENDING - CTICU    MEDICATIONS  (STANDING):  acetaminophen     Tablet .. 650 milliGRAM(s) Oral every 6 hours  aMIOdarone    Tablet 400 milliGRAM(s) Oral two times a day  ascorbic acid 500 milliGRAM(s) Oral two times a day  aspirin enteric coated 81 milliGRAM(s) Oral daily  atorvastatin 80 milliGRAM(s) Oral at bedtime  bisacodyl Suppository 10 milliGRAM(s) Rectal once  chlorhexidine 2% Cloths 1 Application(s) Topical daily  dexMEDEtomidine Infusion 0.2 MICROgram(s)/kG/Hr (4.18 mL/Hr) IV Continuous <Continuous>  dextrose 50% Injectable 50 milliLiter(s) IV Push every 15 minutes  DOBUTamine Infusion 1 MICROgram(s)/kG/Min (2.51 mL/Hr) IV Continuous <Continuous>  furosemide   Injectable 40 milliGRAM(s) IV Push every 12 hours  gabapentin 100 milliGRAM(s) Oral every 8 hours  heparin  Infusion 500 Unit(s)/Hr (8 mL/Hr) IV Continuous <Continuous>  insulin regular Infusion 3 Unit(s)/Hr (3 mL/Hr) IV Continuous <Continuous>  milrinone Infusion 0.4 MICROgram(s)/kG/Min (10 mL/Hr) IV Continuous <Continuous>  pantoprazole  Injectable 40 milliGRAM(s) IV Push daily  polyethylene glycol 3350 17 Gram(s) Oral daily  senna 2 Tablet(s) Oral at bedtime  sodium chloride 0.9%. 1000 milliLiter(s) (10 mL/Hr) IV Continuous <Continuous>  vasopressin Infusion 0.033 Unit(s)/Min (2 mL/Hr) IV Continuous <Continuous>                                    8.2    19.31 )-----------( 207      ( 25 Mar 2022 00:22 )             24.8       03-25    138  |  101  |  18  ----------------------------<  127<H>  4.2   |  25  |  0.74    Ca    8.7      25 Mar 2022 00:23  Phos  2.7     03-25  Mg     2.3     03-25    TPro  5.6<L>  /  Alb  3.6  /  TBili  0.4  /  DBili  x   /  AST  25  /  ALT  29  /  AlkPhos  78  03-25      PT/INR - ( 23 Mar 2022 15:20 )   PT: 18.6 sec;   INR: 1.60 ratio         PTT - ( 25 Mar 2022 05:57 )  PTT:36.8 sec        Daily     Daily Weight in k (25 Mar 2022 00:00)       @ 07:  -   @ 07:00  --------------------------------------------------------  IN: 2779.6 mL / OUT: 1635 mL / NET: 1144.6 mL     @ 07:  -   @ 06:33  --------------------------------------------------------  IN: 1819.8 mL / OUT: 3050 mL / NET: -1230.2 mL      Critically Ill patient  : [ ] preoperative ,   [ x] post operative    Requires :  [ x] Arterial Line   [ x] Central Line  [ x] PA catheter  [x ] IABP  [ ] ECMO  [ ] LVAD  [ ] Ventilator  [x ] pacemaker - TPM [ ] Impella   [x] HFO2                       [x ] ABG's     [ x] Pulse Oxymetry Monitoring  Bedside evaluation , monitoring , treatment of hemodynamics , fluids , IVP/ IVCD meds.        Diagnosis:     Admitted - NSTEMI    POD 2 - CABG X 3 L    Hypotension    Multivessel CAD, post infract cardiogenic shock s/p IABP placement     Chest tube drainage     Hypoxemia - Requires [ ] BIPAP  [ x] HF O2     Requires chest PT, pulmonary toilet, suctioning to maintain SaO2,  patent airway and treat atelectasis.     Decorah Horacio catheter interpretation and therapeutic management of unstable hemodynamics     IABP   [x ] management at 1:2 due to tachycardia    [x ] wean 1:1 1:2 1:3   [ x] removal and f/u vascular checks today    CHF- acute [x ]   chronic [x ]    systolic [x ]   diatolic [ ]          - Echo- EF - 20 - 30 %            [ ] RV dysfunction          - Cxr-cardiomegally, edema          - Clinical-  [ x]inotropes   [x ]pressors   [ ]diuresis   [x ]IABP   [ ]ECMO   [ ]LVAD   [ ]Respiratory Failure    Hemodynamic lability,  instability. Requires IVCD [x ] vasopressors [x ] inotropes  [ ] vasodilator  [ ]IVSS fluid  to maintain MAP, perfusion, C.I.     IVCD anticoagulation with [ x] Heparin  [ ] Argatroban for  IABP circuit     Temporary pacemaker (TPM) interrogation and setting.     Hypotension     Cardiogenic Shock  - resolving    Hypovolemia    IVCD insulin     Requires bedside physical therapy, mobilization and total [facility] care.             I, Shorty Moon, personally performed the services described in this documentation. All medical record entries made by the pauloibmehnaz were at my direction and in my presence. I have reviewed the chart and agree that the record reflects my personal performance and is accurate and complete.   Shorty Moon MD.       By signing my name below, I, Maday Elam, attest that this documentation has been prepared under the direction and in the presence of Shorty Moon MD.   Electronically Signed: Maday Elam Scribe 22 @ 06:33        Discussed with CT surgeon, Physician Assistant - Nurse Practitioner- Critical care medicine team.   Dicussed at  AM / PM rounds.   Chart, labs , films reviewed.    Cumulative Critical Care Time Given Today:  45 min

## 2022-03-26 LAB
ALBUMIN SERPL ELPH-MCNC: 3.2 G/DL — LOW (ref 3.3–5)
ALP SERPL-CCNC: 82 U/L — SIGNIFICANT CHANGE UP (ref 40–120)
ALT FLD-CCNC: 34 U/L — SIGNIFICANT CHANGE UP (ref 10–45)
ANION GAP SERPL CALC-SCNC: 9 MMOL/L — SIGNIFICANT CHANGE UP (ref 5–17)
AST SERPL-CCNC: 22 U/L — SIGNIFICANT CHANGE UP (ref 10–40)
BASE EXCESS BLDV CALC-SCNC: 2.2 MMOL/L — HIGH (ref -2–2)
BASE EXCESS BLDV CALC-SCNC: 4 MMOL/L — HIGH (ref -2–2)
BILIRUB SERPL-MCNC: 0.5 MG/DL — SIGNIFICANT CHANGE UP (ref 0.2–1.2)
BLD GP AB SCN SERPL QL: NEGATIVE — SIGNIFICANT CHANGE UP
BLOOD GAS VENOUS - CREATININE: SIGNIFICANT CHANGE UP MG/DL (ref 0.5–1.3)
BUN SERPL-MCNC: 20 MG/DL — SIGNIFICANT CHANGE UP (ref 7–23)
CA-I SERPL-SCNC: 1.11 MMOL/L — LOW (ref 1.15–1.33)
CALCIUM SERPL-MCNC: 8.2 MG/DL — LOW (ref 8.4–10.5)
CHLORIDE BLDV-SCNC: 104 MMOL/L — SIGNIFICANT CHANGE UP (ref 96–108)
CHLORIDE SERPL-SCNC: 101 MMOL/L — SIGNIFICANT CHANGE UP (ref 96–108)
CO2 BLDV-SCNC: 29 MMOL/L — HIGH (ref 22–26)
CO2 BLDV-SCNC: 31 MMOL/L — HIGH (ref 22–26)
CO2 SERPL-SCNC: 27 MMOL/L — SIGNIFICANT CHANGE UP (ref 22–31)
CREAT SERPL-MCNC: 0.66 MG/DL — SIGNIFICANT CHANGE UP (ref 0.5–1.3)
EGFR: 103 ML/MIN/1.73M2 — SIGNIFICANT CHANGE UP
GAS PNL BLDA: SIGNIFICANT CHANGE UP
GAS PNL BLDV: 136 MMOL/L — SIGNIFICANT CHANGE UP (ref 136–145)
GAS PNL BLDV: SIGNIFICANT CHANGE UP
GLUCOSE BLDC GLUCOMTR-MCNC: 135 MG/DL — HIGH (ref 70–99)
GLUCOSE BLDC GLUCOMTR-MCNC: 146 MG/DL — HIGH (ref 70–99)
GLUCOSE BLDC GLUCOMTR-MCNC: 154 MG/DL — HIGH (ref 70–99)
GLUCOSE BLDC GLUCOMTR-MCNC: 193 MG/DL — HIGH (ref 70–99)
GLUCOSE BLDC GLUCOMTR-MCNC: 236 MG/DL — HIGH (ref 70–99)
GLUCOSE BLDV-MCNC: 123 MG/DL — HIGH (ref 70–99)
GLUCOSE SERPL-MCNC: 159 MG/DL — HIGH (ref 70–99)
HCO3 BLDV-SCNC: 27 MMOL/L — SIGNIFICANT CHANGE UP (ref 22–29)
HCO3 BLDV-SCNC: 30 MMOL/L — HIGH (ref 22–29)
HCT VFR BLD CALC: 27.2 % — LOW (ref 39–50)
HCT VFR BLDA CALC: 25 % — LOW (ref 39–51)
HGB BLD CALC-MCNC: 8.4 G/DL — LOW (ref 12.6–17.4)
HGB BLD-MCNC: 8.8 G/DL — LOW (ref 13–17)
HOROWITZ INDEX BLDV+IHG-RTO: 36 — SIGNIFICANT CHANGE UP
LACTATE BLDV-MCNC: 0.7 MMOL/L — SIGNIFICANT CHANGE UP (ref 0.7–2)
MAGNESIUM SERPL-MCNC: 2.2 MG/DL — SIGNIFICANT CHANGE UP (ref 1.6–2.6)
MCHC RBC-ENTMCNC: 29 PG — SIGNIFICANT CHANGE UP (ref 27–34)
MCHC RBC-ENTMCNC: 32.4 GM/DL — SIGNIFICANT CHANGE UP (ref 32–36)
MCV RBC AUTO: 89.8 FL — SIGNIFICANT CHANGE UP (ref 80–100)
NRBC # BLD: 0 /100 WBCS — SIGNIFICANT CHANGE UP (ref 0–0)
PCO2 BLDV: 44 MMHG — SIGNIFICANT CHANGE UP (ref 42–55)
PCO2 BLDV: 48 MMHG — SIGNIFICANT CHANGE UP (ref 42–55)
PH BLDV: 7.4 — SIGNIFICANT CHANGE UP (ref 7.32–7.43)
PH BLDV: 7.4 — SIGNIFICANT CHANGE UP (ref 7.32–7.43)
PHOSPHATE SERPL-MCNC: 3.4 MG/DL — SIGNIFICANT CHANGE UP (ref 2.5–4.5)
PLATELET # BLD AUTO: 178 K/UL — SIGNIFICANT CHANGE UP (ref 150–400)
PO2 BLDV: 43 MMHG — SIGNIFICANT CHANGE UP (ref 25–45)
PO2 BLDV: 45 MMHG — SIGNIFICANT CHANGE UP (ref 25–45)
POTASSIUM BLDV-SCNC: 3.4 MMOL/L — LOW (ref 3.5–5.1)
POTASSIUM SERPL-MCNC: 4.1 MMOL/L — SIGNIFICANT CHANGE UP (ref 3.5–5.3)
POTASSIUM SERPL-SCNC: 4.1 MMOL/L — SIGNIFICANT CHANGE UP (ref 3.5–5.3)
PROT SERPL-MCNC: 5.3 G/DL — LOW (ref 6–8.3)
RBC # BLD: 3.03 M/UL — LOW (ref 4.2–5.8)
RBC # FLD: 13.7 % — SIGNIFICANT CHANGE UP (ref 10.3–14.5)
RH IG SCN BLD-IMP: POSITIVE — SIGNIFICANT CHANGE UP
SAO2 % BLDV: 71.5 % — SIGNIFICANT CHANGE UP (ref 67–88)
SAO2 % BLDV: 75.5 % — SIGNIFICANT CHANGE UP (ref 67–88)
SODIUM SERPL-SCNC: 137 MMOL/L — SIGNIFICANT CHANGE UP (ref 135–145)
WBC # BLD: 15.97 K/UL — HIGH (ref 3.8–10.5)
WBC # FLD AUTO: 15.97 K/UL — HIGH (ref 3.8–10.5)

## 2022-03-26 PROCEDURE — 99291 CRITICAL CARE FIRST HOUR: CPT

## 2022-03-26 PROCEDURE — 71045 X-RAY EXAM CHEST 1 VIEW: CPT | Mod: 26

## 2022-03-26 RX ORDER — POTASSIUM CHLORIDE 20 MEQ
40 PACKET (EA) ORAL ONCE
Refills: 0 | Status: COMPLETED | OUTPATIENT
Start: 2022-03-26 | End: 2022-03-26

## 2022-03-26 RX ORDER — DEXMEDETOMIDINE HYDROCHLORIDE IN 0.9% SODIUM CHLORIDE 4 UG/ML
0.2 INJECTION INTRAVENOUS
Qty: 200 | Refills: 0 | Status: DISCONTINUED | OUTPATIENT
Start: 2022-03-26 | End: 2022-03-27

## 2022-03-26 RX ORDER — PANTOPRAZOLE SODIUM 20 MG/1
40 TABLET, DELAYED RELEASE ORAL
Refills: 0 | Status: DISCONTINUED | OUTPATIENT
Start: 2022-03-26 | End: 2022-03-30

## 2022-03-26 RX ORDER — INSULIN LISPRO 100/ML
11 VIAL (ML) SUBCUTANEOUS
Refills: 0 | Status: DISCONTINUED | OUTPATIENT
Start: 2022-03-26 | End: 2022-03-29

## 2022-03-26 RX ORDER — FUROSEMIDE 40 MG
40 TABLET ORAL DAILY
Refills: 0 | Status: DISCONTINUED | OUTPATIENT
Start: 2022-03-26 | End: 2022-03-27

## 2022-03-26 RX ORDER — ENOXAPARIN SODIUM 100 MG/ML
40 INJECTION SUBCUTANEOUS EVERY 24 HOURS
Refills: 0 | Status: DISCONTINUED | OUTPATIENT
Start: 2022-03-26 | End: 2022-03-30

## 2022-03-26 RX ORDER — INSULIN GLARGINE 100 [IU]/ML
34 INJECTION, SOLUTION SUBCUTANEOUS AT BEDTIME
Refills: 0 | Status: DISCONTINUED | OUTPATIENT
Start: 2022-03-26 | End: 2022-03-29

## 2022-03-26 RX ORDER — ALBUMIN HUMAN 25 %
50 VIAL (ML) INTRAVENOUS
Refills: 0 | Status: DISCONTINUED | OUTPATIENT
Start: 2022-03-26 | End: 2022-03-27

## 2022-03-26 RX ADMIN — GABAPENTIN 100 MILLIGRAM(S): 400 CAPSULE ORAL at 21:20

## 2022-03-26 RX ADMIN — ATORVASTATIN CALCIUM 80 MILLIGRAM(S): 80 TABLET, FILM COATED ORAL at 21:19

## 2022-03-26 RX ADMIN — INSULIN GLARGINE 34 UNIT(S): 100 INJECTION, SOLUTION SUBCUTANEOUS at 21:22

## 2022-03-26 RX ADMIN — Medication 9 UNIT(S): at 07:24

## 2022-03-26 RX ADMIN — OXYCODONE HYDROCHLORIDE 10 MILLIGRAM(S): 5 TABLET ORAL at 21:00

## 2022-03-26 RX ADMIN — SPIRONOLACTONE 25 MILLIGRAM(S): 25 TABLET, FILM COATED ORAL at 06:33

## 2022-03-26 RX ADMIN — Medication 40 MILLIGRAM(S): at 06:34

## 2022-03-26 RX ADMIN — GABAPENTIN 100 MILLIGRAM(S): 400 CAPSULE ORAL at 06:33

## 2022-03-26 RX ADMIN — CHLORHEXIDINE GLUCONATE 1 APPLICATION(S): 213 SOLUTION TOPICAL at 20:00

## 2022-03-26 RX ADMIN — Medication 40 MILLIEQUIVALENT(S): at 17:01

## 2022-03-26 RX ADMIN — POLYETHYLENE GLYCOL 3350 17 GRAM(S): 17 POWDER, FOR SOLUTION ORAL at 12:18

## 2022-03-26 RX ADMIN — Medication 50 MILLILITER(S): at 01:31

## 2022-03-26 RX ADMIN — Medication 50 MILLILITER(S): at 00:58

## 2022-03-26 RX ADMIN — Medication 40 MILLIGRAM(S): at 12:18

## 2022-03-26 RX ADMIN — PANTOPRAZOLE SODIUM 40 MILLIGRAM(S): 20 TABLET, DELAYED RELEASE ORAL at 12:18

## 2022-03-26 RX ADMIN — Medication 650 MILLIGRAM(S): at 06:32

## 2022-03-26 RX ADMIN — Medication 11 UNIT(S): at 16:12

## 2022-03-26 RX ADMIN — Medication 650 MILLIGRAM(S): at 12:18

## 2022-03-26 RX ADMIN — SENNA PLUS 2 TABLET(S): 8.6 TABLET ORAL at 21:18

## 2022-03-26 RX ADMIN — Medication 81 MILLIGRAM(S): at 12:18

## 2022-03-26 RX ADMIN — Medication 1: at 12:20

## 2022-03-26 RX ADMIN — GABAPENTIN 100 MILLIGRAM(S): 400 CAPSULE ORAL at 14:15

## 2022-03-26 RX ADMIN — Medication 50 MILLILITER(S): at 01:15

## 2022-03-26 RX ADMIN — Medication 650 MILLIGRAM(S): at 07:00

## 2022-03-26 RX ADMIN — Medication 650 MILLIGRAM(S): at 13:02

## 2022-03-26 RX ADMIN — Medication 500 MILLIGRAM(S): at 17:01

## 2022-03-26 RX ADMIN — AMIODARONE HYDROCHLORIDE 400 MILLIGRAM(S): 400 TABLET ORAL at 06:33

## 2022-03-26 RX ADMIN — Medication 500 MILLIGRAM(S): at 06:34

## 2022-03-26 RX ADMIN — Medication 650 MILLIGRAM(S): at 00:00

## 2022-03-26 RX ADMIN — OXYCODONE HYDROCHLORIDE 10 MILLIGRAM(S): 5 TABLET ORAL at 06:57

## 2022-03-26 RX ADMIN — Medication 2: at 16:12

## 2022-03-26 RX ADMIN — OXYCODONE HYDROCHLORIDE 10 MILLIGRAM(S): 5 TABLET ORAL at 07:00

## 2022-03-26 RX ADMIN — Medication 9 UNIT(S): at 12:19

## 2022-03-26 RX ADMIN — ENOXAPARIN SODIUM 40 MILLIGRAM(S): 100 INJECTION SUBCUTANEOUS at 06:34

## 2022-03-26 RX ADMIN — OXYCODONE HYDROCHLORIDE 10 MILLIGRAM(S): 5 TABLET ORAL at 20:00

## 2022-03-26 NOTE — PHYSICAL THERAPY INITIAL EVALUATION ADULT - ADDITIONAL COMMENTS
Prior to admission pt reports being independent of all ADL's & functional mobility without AD. Pt resides in house with family, no steps to enter, on first floor. (+) driving. (+) walk in shower.

## 2022-03-26 NOTE — PHYSICAL THERAPY INITIAL EVALUATION ADULT - PRECAUTIONS/LIMITATIONS, REHAB EVAL
CONT: Patient was in cardiogenic shock, and IABP was placed and Dobutamine drip was started. TTE demonstrated EF of 20-25%, RV size normal but decreased function. No LV thrombus. Patient was transferred and admitted to CTU for further management under Dr. Abad, s/p 3V CABG on 3/23 (LAD-LIMA, SVG-OM, SVG-PDA). IABP removed yesterday 3/25./fall precautions/sternal precautions/surgical precautions

## 2022-03-26 NOTE — PHYSICAL THERAPY INITIAL EVALUATION ADULT - PERTINENT HX OF CURRENT PROBLEM, REHAB EVAL
65 y/o Male with no PMH presented to [place] complaining of chest heaviness/pain since last night, associated with diaphoresis and emesis. Patient took 324 ASA this morning. EKG revealed new LBBB. [facility] which demonstrated severe multivessel CAD.

## 2022-03-26 NOTE — PHYSICAL THERAPY INITIAL EVALUATION ADULT - SITTING BALANCE: STATIC
Relieving Tension in Your Back  Being relaxed helps keep your mind healthy and your back ready to move. Take short breaks often. Walk around. Stretch. Switch tasks. Also give the following a try.  Make time to relax. Start by setting aside 5 minutes daily.   Deep breathing    Deep breathing is a simple way to reduce stress. You can do it almost any time you need to relax.  · Inhale slowly through your nose. Let your lungs and stomach expand.  · Hold your breath for 2 to 3 seconds.  · Exhale slowly through your mouth until your lungs feel empty. Repeat 3 to 4 times.  Relieve tension  Muscle tension can create tender spots called trigger points. The tips below may help relieve muscle tension.  · Press the trigger point if you can reach it. If not, lie on a soft tennis ball, or ask a friend to press the spot. Use steady pressure for 10 to 15 seconds. Breathe deeply. Repeat a few times.  · Massage trigger points with ice for 2 to 5 minutes. Press lightly at first. Slowly increase firmness.  Date Last Reviewed: 10/18/2015  © 8353-9812 [employer], [employer]. [address] [place] [address], [place] [address]. All rights reserved. This information is not intended as a substitute for professional medical care. Always follow your healthcare professional's instructions.         good balance

## 2022-03-26 NOTE — PROGRESS NOTE ADULT - SUBJECTIVE AND OBJECTIVE BOX
CRITICAL CARE ATTENDING - CTICU    MEDICATIONS  (STANDING):  acetaminophen     Tablet .. 650 milliGRAM(s) Oral every 6 hours  albumin human 25% IVPB 50 milliLiter(s) IV Intermittent every 10 minutes  ascorbic acid 500 milliGRAM(s) Oral two times a day  aspirin enteric coated 81 milliGRAM(s) Oral daily  atorvastatin 80 milliGRAM(s) Oral at bedtime  bisacodyl Suppository 10 milliGRAM(s) Rectal once  chlorhexidine 2% Cloths 1 Application(s) Topical daily  dexMEDEtomidine Infusion 0.2 MICROgram(s)/kG/Hr (4.18 mL/Hr) IV Continuous <Continuous>  dextrose 40% Gel 15 Gram(s) Oral once  dextrose 50% Injectable 50 milliLiter(s) IV Push every 15 minutes  DOBUTamine Infusion 1 MICROgram(s)/kG/Min (2.51 mL/Hr) IV Continuous <Continuous>  enoxaparin Injectable 40 milliGRAM(s) SubCutaneous every 24 hours  enoxaparin Injectable 40 milliGRAM(s) SubCutaneous once  furosemide   Injectable 40 milliGRAM(s) IV Push every 12 hours  gabapentin 100 milliGRAM(s) Oral every 8 hours  glucagon  Injectable 1 milliGRAM(s) IntraMuscular once  insulin glargine Injectable (LANTUS) 30 Unit(s) SubCutaneous at bedtime  insulin lispro (ADMELOG) corrective regimen sliding scale   SubCutaneous three times a day before meals  insulin lispro (ADMELOG) corrective regimen sliding scale   SubCutaneous at bedtime  insulin lispro Injectable (ADMELOG) 9 Unit(s) SubCutaneous three times a day before meals  insulin regular Infusion 3 Unit(s)/Hr (3 mL/Hr) IV Continuous <Continuous>  pantoprazole  Injectable 40 milliGRAM(s) IV Push daily  polyethylene glycol 3350 17 Gram(s) Oral daily  senna 2 Tablet(s) Oral at bedtime  sodium chloride 0.9%. 1000 milliLiter(s) (10 mL/Hr) IV Continuous <Continuous>  spironolactone 25 milliGRAM(s) Oral daily                                    8.8    15.97 )-----------( 178      ( 26 Mar 2022 00:47 )             27.2       03-26    137  |  101  |  20  ----------------------------<  159<H>  4.1   |  27  |  0.66    Ca    8.2<L>      26 Mar 2022 00:47  Phos  3.4     03-26  Mg     2.2     03-26    TPro  5.3<L>  /  Alb  3.2<L>  /  TBili  0.5  /  DBili  x   /  AST  22  /  ALT  34  /  AlkPhos  82  03-26      PTT - ( 25 Mar 2022 05:57 )  PTT:36.8 sec        Daily     Daily       03-24 @ 07:01  -  03-25 @ 07:00  --------------------------------------------------------  IN: 1856.5 mL / OUT: 3400 mL / NET: -1543.5 mL    03-25 @ 07:01  -  03-26 @ 06:37  --------------------------------------------------------  IN: 1581.7 mL / OUT: 3820 mL / NET: -2238.3 mL      Critically Ill patient  : [ ] preoperative ,   [ x] post operative    Requires :  [ x] Arterial Line   [ x] Central Line  [ x] PA catheter  [ ] IABP  [ ] ECMO  [ ] LVAD  [ ] Ventilator  [x ] pacemaker - TPM [ ] Impella                       [x ] ABG's     [ x] Pulse Oxymetry Monitoring  Bedside evaluation , monitoring , treatment of hemodynamics , fluids , IVP/ IVCD meds.        Diagnosis:     Admitted - NSTEMI    POD 2 - CABG X 3 L    Hypotension    Multivessel CAD, post infract cardiogenic shock s/p IABP placement     Chest tube drainage     Requires chest PT, pulmonary toilet, suctioning to maintain SaO2,  patent airway and treat atelectasis.     [place] Horacio catheter interpretation and therapeutic management of unstable hemodynamics     IABP  removed yesterday    CHF- acute [x ]   chronic [x ]    systolic [x ]   diatolic [ ]          - Echo- EF - 20 - 30 %            [ ] RV dysfunction          - Cxr-cardiomegally, edema          - Clinical-  [ x]inotropes   [x ]pressors   [ ]diuresis   [x ]IABP   [ ]ECMO   [ ]LVAD   [ ]Respiratory Failure    Hemodynamic lability,  instability. Requires IVCD [x ] vasopressors [x ] inotropes  [ ] vasodilator  [ ]IVSS fluid  to maintain MAP, perfusion, C.I.     IVCD anticoagulation with [ x] Heparin  [ ] Argatroban for  IABP circuit     Temporary pacemaker (TPM) interrogation and setting.     Hypotension     Cardiogenic Shock  - resolving    Hypovolemia    IVCD insulin     Requires bedside physical therapy, mobilization and total assisted care.             I, Shorty Moon, personally performed the services described in this documentation. All medical record entries made by the pauloibmehnaz were at my direction and in my presence. I have reviewed the chart and agree that the record reflects my personal performance and is accurate and complete.   Shorty Moon MD.       By signing my name below, I, Maday Elam, attest that this documentation has been prepared under the direction and in the presence of Shorty Moon MD.   Electronically Signed: Maday Elam Scribe 03-26-22 @ 06:37        Discussed with CT surgeon, Physician Assistant - Nurse Practitioner- Critical care medicine team.   Dicussed at  AM / PM rounds.   Chart, labs , films reviewed.    Cumulative Critical Care Time Given Today:  CRITICAL CARE ATTENDING - CTICU    MEDICATIONS  (STANDING):  acetaminophen     Tablet .. 650 milliGRAM(s) Oral every 6 hours  albumin human 25% IVPB 50 milliLiter(s) IV Intermittent every 10 minutes  ascorbic acid 500 milliGRAM(s) Oral two times a day  aspirin enteric coated 81 milliGRAM(s) Oral daily  atorvastatin 80 milliGRAM(s) Oral at bedtime  bisacodyl Suppository 10 milliGRAM(s) Rectal once  chlorhexidine 2% Cloths 1 Application(s) Topical daily  dexMEDEtomidine Infusion 0.2 MICROgram(s)/kG/Hr (4.18 mL/Hr) IV Continuous <Continuous>  dextrose 40% Gel 15 Gram(s) Oral once  dextrose 50% Injectable 50 milliLiter(s) IV Push every 15 minutes  DOBUTamine Infusion 1 MICROgram(s)/kG/Min (2.51 mL/Hr) IV Continuous <Continuous>  enoxaparin Injectable 40 milliGRAM(s) SubCutaneous every 24 hours  enoxaparin Injectable 40 milliGRAM(s) SubCutaneous once  furosemide   Injectable 40 milliGRAM(s) IV Push every 12 hours  gabapentin 100 milliGRAM(s) Oral every 8 hours  glucagon  Injectable 1 milliGRAM(s) IntraMuscular once  insulin glargine Injectable (LANTUS) 30 Unit(s) SubCutaneous at bedtime  insulin lispro (ADMELOG) corrective regimen sliding scale   SubCutaneous three times a day before meals  insulin lispro (ADMELOG) corrective regimen sliding scale   SubCutaneous at bedtime  insulin lispro Injectable (ADMELOG) 9 Unit(s) SubCutaneous three times a day before meals  insulin regular Infusion 3 Unit(s)/Hr (3 mL/Hr) IV Continuous <Continuous>  pantoprazole  Injectable 40 milliGRAM(s) IV Push daily  polyethylene glycol 3350 17 Gram(s) Oral daily  senna 2 Tablet(s) Oral at bedtime  sodium chloride 0.9%. 1000 milliLiter(s) (10 mL/Hr) IV Continuous <Continuous>  spironolactone 25 milliGRAM(s) Oral daily                                    8.8    15.97 )-----------( 178      ( 26 Mar 2022 00:47 )             27.2       03-26    137  |  101  |  20  ----------------------------<  159<H>  4.1   |  27  |  0.66    Ca    8.2<L>      26 Mar 2022 00:47  Phos  3.4     03-26  Mg     2.2     03-26    TPro  5.3<L>  /  Alb  3.2<L>  /  TBili  0.5  /  DBili  x   /  AST  22  /  ALT  34  /  AlkPhos  82  03-26      PTT - ( 25 Mar 2022 05:57 )  PTT:36.8 sec        Daily     Daily       03-24 @ 07:01  -  03-25 @ 07:00  --------------------------------------------------------  IN: 1856.5 mL / OUT: 3400 mL / NET: -1543.5 mL    03-25 @ 07:01  -  03-26 @ 06:37  --------------------------------------------------------  IN: 1581.7 mL / OUT: 3820 mL / NET: -2238.3 mL      Critically Ill patient  : [ ] preoperative ,   [ x] post operative    Requires :  [ x] Arterial Line   [ x] Central Line  [ x] PA catheter  [ ] IABP  [ ] ECMO  [ ] LVAD  [ ] Ventilator  [x ] pacemaker - TPM [ ] Impella                       [x ] ABG's     [ x] Pulse Oxymetry Monitoring  Bedside evaluation , monitoring , treatment of hemodynamics , fluids , IVP/ IVCD meds.        Diagnosis:     Admitted - NSTEMI    POD 3 - CABG X 3 L    Hypotension    Multivessel CAD, post infract cardiogenic shock s/p IABP placement     Chest tube drainage     Requires chest PT, pulmonary toilet, suctioning to maintain SaO2,  patent airway and treat atelectasis.     [place] Horacio catheter interpretation and therapeutic management of unstable hemodynamics     IABP  removed yesterday    CHF- acute [x ]   chronic [x ]    systolic [x ]   diatolic [ ]          - Echo- EF - 20 - 30 %            [ ] RV dysfunction          - Cxr-cardiomegally, edema          - Clinical-  [ x]inotropes   [x ]pressors   [ ]diuresis   [x ]IABP   [ ]ECMO   [ ]LVAD   [ ]Respiratory Failure    Hemodynamic lability,  instability. Requires IVCD [x ] vasopressors [x ] inotropes  [ ] vasodilator  [ ]IVSS fluid  to maintain MAP, perfusion, C.I.     IVCD anticoagulation with [ x] Heparin  [ ] Argatroban for  IABP circuit     Temporary pacemaker (TPM) interrogation and setting.     Hypotension     Cardiogenic Shock  - resolving    Hypovolemia    IVCD insulin     Requires bedside physical therapy, mobilization and total assisted care.             I, Shorty Moon, personally performed the services described in this documentation. All medical record entries made by the pauloibmehnaz were at my direction and in my presence. I have reviewed the chart and agree that the record reflects my personal performance and is accurate and complete.   Shorty Moon MD.       By signing my name below, I, Maday Elam, attest that this documentation has been prepared under the direction and in the presence of Shorty Moon MD.   Electronically Signed: Maday Elam Scribe 03-26-22 @ 06:37        Discussed with CT surgeon, Physician Assistant - Nurse Practitioner- Critical care medicine team.   Dicussed at  AM / PM rounds.   Chart, labs , films reviewed.    Cumulative Critical Care Time Given Today:  CRITICAL CARE ATTENDING - CTICU    MEDICATIONS  (STANDING):  acetaminophen     Tablet .. 650 milliGRAM(s) Oral every 6 hours  albumin human 25% IVPB 50 milliLiter(s) IV Intermittent every 10 minutes  ascorbic acid 500 milliGRAM(s) Oral two times a day  aspirin enteric coated 81 milliGRAM(s) Oral daily  atorvastatin 80 milliGRAM(s) Oral at bedtime  bisacodyl Suppository 10 milliGRAM(s) Rectal once  chlorhexidine 2% Cloths 1 Application(s) Topical daily  dexMEDEtomidine Infusion 0.2 MICROgram(s)/kG/Hr (4.18 mL/Hr) IV Continuous <Continuous>  dextrose 40% Gel 15 Gram(s) Oral once  dextrose 50% Injectable 50 milliLiter(s) IV Push every 15 minutes  DOBUTamine Infusion 1 MICROgram(s)/kG/Min (2.51 mL/Hr) IV Continuous <Continuous>  enoxaparin Injectable 40 milliGRAM(s) SubCutaneous every 24 hours  enoxaparin Injectable 40 milliGRAM(s) SubCutaneous once  furosemide   Injectable 40 milliGRAM(s) IV Push every 12 hours  gabapentin 100 milliGRAM(s) Oral every 8 hours  glucagon  Injectable 1 milliGRAM(s) IntraMuscular once  insulin glargine Injectable (LANTUS) 30 Unit(s) SubCutaneous at bedtime  insulin lispro (ADMELOG) corrective regimen sliding scale   SubCutaneous three times a day before meals  insulin lispro (ADMELOG) corrective regimen sliding scale   SubCutaneous at bedtime  insulin lispro Injectable (ADMELOG) 9 Unit(s) SubCutaneous three times a day before meals  insulin regular Infusion 3 Unit(s)/Hr (3 mL/Hr) IV Continuous <Continuous>  pantoprazole  Injectable 40 milliGRAM(s) IV Push daily  polyethylene glycol 3350 17 Gram(s) Oral daily  senna 2 Tablet(s) Oral at bedtime  sodium chloride 0.9%. 1000 milliLiter(s) (10 mL/Hr) IV Continuous <Continuous>  spironolactone 25 milliGRAM(s) Oral daily                                    8.8    15.97 )-----------( 178      ( 26 Mar 2022 00:47 )             27.2       03-26    137  |  101  |  20  ----------------------------<  159<H>  4.1   |  27  |  0.66    Ca    8.2<L>      26 Mar 2022 00:47  Phos  3.4     03-26  Mg     2.2     03-26    TPro  5.3<L>  /  Alb  3.2<L>  /  TBili  0.5  /  DBili  x   /  AST  22  /  ALT  34  /  AlkPhos  82  03-26      PTT - ( 25 Mar 2022 05:57 )  PTT:36.8 sec        Daily     Daily       03-24 @ 07:01  -  03-25 @ 07:00  --------------------------------------------------------  IN: 1856.5 mL / OUT: 3400 mL / NET: -1543.5 mL    03-25 @ 07:01  -  03-26 @ 06:37  --------------------------------------------------------  IN: 1581.7 mL / OUT: 3820 mL / NET: -2238.3 mL      Critically Ill patient  : [ ] preoperative ,   [ x] post operative    Requires :  [ x] Arterial Line   [ x] Central Line  [ x] PA catheter  [ ] IABP  [ ] ECMO  [ ] LVAD  [ ] Ventilator  [x ] pacemaker - TPM [ ] Impella                       [x ] ABG's     [ x] Pulse Oxymetry Monitoring  Bedside evaluation , monitoring , treatment of hemodynamics , fluids , IVP/ IVCD meds.        Diagnosis:     Admitted - NSTEMI    POD 3 - CABG X 3 L    Hypotension    Multivessel CAD, post infract cardiogenic shock s/p IABP placement     Chest tube drainage     Requires chest PT, pulmonary toilet, suctioning to maintain SaO2,  patent airway and treat atelectasis.     [place] Horacio catheter interpretation and therapeutic management of unstable hemodynamics     IABP  removed yesterday    CHF- acute [x ]   chronic [x ]    systolic [x ]   diatolic [ ]          - Echo- EF - 20 - 30 %            [ ] RV dysfunction          - Cxr-cardiomegally, edema          - Clinical-  [ x]inotropes   [x ]pressors - on/off    [ ]diuresis   [x ]IABP   [ ]ECMO   [ ]LVAD   [ ]Respiratory Failure    Hemodynamic lability,  instability. Requires IVCD [x ] vasopressors - on/off  [x ] inotropes  [ ] vasodilator  [ ]IVSS fluid  to maintain MAP, perfusion, C.I.      Temporary pacemaker (TPM) interrogation and setting.     Hypotension     Cardiogenic Shock  - resolving    Hypovolemia    IVCD insulin     Requires bedside physical therapy, mobilization and total nursing home care.             I, Shorty Moon, personally performed the services described in this documentation. All medical record entries made by the scribe were at my direction and in my presence. I have reviewed the chart and agree that the record reflects my personal performance and is accurate and complete.   Shorty Moon MD.       By signing my name below, I, Maday Elam, attest that this documentation has been prepared under the direction and in the presence of Shorty Moon MD.   Electronically Signed: Maday Elam Scribe 03-26-22 @ 06:37        Discussed with CT surgeon, Physician Assistant - Nurse Practitioner- Critical care medicine team.   Dicussed at  AM / PM rounds.   Chart, labs , films reviewed.    Cumulative Critical Care Time Given Today:  30 min

## 2022-03-26 NOTE — PROGRESS NOTE ADULT - PROBLEM SELECTOR PLAN 1
Will increase Lantus to 34 units at bed time.  Will increase Admelog to 11 units before each meal in addition to Admelog correction scale coverage.  Patient counseled for compliance with consistent low carb diet.

## 2022-03-26 NOTE — PROGRESS NOTE ADULT - ASSESSMENT
Assessment  DMT2: 66y Male with newly dx DM T2 with hyperglycemia, A1C 11.4%, was not taking any hypoglycemic agents, was on IV insulin, switched to basal bolus insulin, blood sugars trending up and not at target, no hypoglycemic  episodes, started eating meals, NAD.  CAD: s/p CABG, on medications, no chest pain, stable, monitored.  Cardiogenic shock: On meds, improving, monitored..  HLD: On statin.      Shoaib Montaño MD  Cell: [phone]  Office: [phone]

## 2022-03-26 NOTE — PROGRESS NOTE ADULT - SUBJECTIVE AND OBJECTIVE BOX
Chief complaint    Patient is a 66y old  Male who presents with a chief complaint of Chest pressure/pain (26 Mar 2022 06:35)   Review of systems  Patient in bed, appears comfortable.    Labs and Fingersticks  CAPILLARY BLOOD GLUCOSE  193 (26 Mar 2022 12:00)  152 (25 Mar 2022 18:00)  183 (25 Mar 2022 17:00)  163 (25 Mar 2022 16:00)  152 (25 Mar 2022 15:00)      POCT Blood Glucose.: 193 mg/dL (26 Mar 2022 11:57)  POCT Blood Glucose.: 146 mg/dL (26 Mar 2022 06:43)  POCT Blood Glucose.: 154 mg/dL (26 Mar 2022 01:33)  POCT Blood Glucose.: 138 mg/dL (25 Mar 2022 22:01)  POCT Blood Glucose.: 165 mg/dL (25 Mar 2022 20:50)  POCT Blood Glucose.: 183 mg/dL (25 Mar 2022 19:40)  POCT Blood Glucose.: 152 mg/dL (25 Mar 2022 18:14)  POCT Blood Glucose.: 183 mg/dL (25 Mar 2022 17:13)  POCT Blood Glucose.: 163 mg/dL (25 Mar 2022 16:15)  POCT Blood Glucose.: 152 mg/dL (25 Mar 2022 15:04)      Anion Gap, Serum: 9 (03-26 @ 00:47)  Anion Gap, Serum: 12 (03-25 @ 00:23)      Calcium, Total Serum: 8.2 *L* (03-26 @ 00:47)  Calcium, Total Serum: 8.7 (03-25 @ 00:23)  Albumin, Serum: 3.2 *L* (03-26 @ 00:47)  Albumin, Serum: 3.6 (03-25 @ 00:23)    Alanine Aminotransferase (ALT/SGPT): 34 (03-26 @ 00:47)  Alanine Aminotransferase (ALT/SGPT): 29 (03-25 @ 00:23)  Alkaline Phosphatase, Serum: 82 (03-26 @ 00:47)  Alkaline Phosphatase, Serum: 78 (03-25 @ 00:23)  Aspartate Aminotransferase (AST/SGOT): 22 (03-26 @ 00:47)  Aspartate Aminotransferase (AST/SGOT): 25 (03-25 @ 00:23)        03-26    137  |  101  |  20  ----------------------------<  159<H>  4.1   |  27  |  0.66    Ca    8.2<L>      26 Mar 2022 00:47  Phos  3.4     03-26  Mg     2.2     03-26    TPro  5.3<L>  /  Alb  3.2<L>  /  TBili  0.5  /  DBili  x   /  AST  22  /  ALT  34  /  AlkPhos  82  03-26                        8.8    15.97 )-----------( 178      ( 26 Mar 2022 00:47 )             27.2     Medications  MEDICATIONS  (STANDING):  albumin human 25% IVPB 50 milliLiter(s) IV Intermittent every 10 minutes  ascorbic acid 500 milliGRAM(s) Oral two times a day  aspirin enteric coated 81 milliGRAM(s) Oral daily  atorvastatin 80 milliGRAM(s) Oral at bedtime  bisacodyl Suppository 10 milliGRAM(s) Rectal once  chlorhexidine 2% Cloths 1 Application(s) Topical daily  dextrose 40% Gel 15 Gram(s) Oral once  dextrose 50% Injectable 50 milliLiter(s) IV Push every 15 minutes  DOBUTamine Infusion 1 MICROgram(s)/kG/Min (2.51 mL/Hr) IV Continuous <Continuous>  enoxaparin Injectable 40 milliGRAM(s) SubCutaneous every 24 hours  furosemide    Tablet 40 milliGRAM(s) Oral daily  gabapentin 100 milliGRAM(s) Oral every 8 hours  glucagon  Injectable 1 milliGRAM(s) IntraMuscular once  insulin glargine Injectable (LANTUS) 34 Unit(s) SubCutaneous at bedtime  insulin lispro (ADMELOG) corrective regimen sliding scale   SubCutaneous three times a day before meals  insulin lispro (ADMELOG) corrective regimen sliding scale   SubCutaneous at bedtime  insulin lispro Injectable (ADMELOG) 11 Unit(s) SubCutaneous three times a day before meals  insulin regular Infusion 3 Unit(s)/Hr (3 mL/Hr) IV Continuous <Continuous>  pantoprazole    Tablet 40 milliGRAM(s) Oral before breakfast  polyethylene glycol 3350 17 Gram(s) Oral daily  senna 2 Tablet(s) Oral at bedtime  sodium chloride 0.9%. 1000 milliLiter(s) (10 mL/Hr) IV Continuous <Continuous>  spironolactone 25 milliGRAM(s) Oral daily      Physical Exam  General: Patient comfortable in bed  Vital Signs Last 12 Hrs  T(F): 98.9 (03-26-22 @ 12:00), Max: 99.1 (03-26-22 @ 08:00)  HR: 104 (03-26-22 @ 14:00) (87 - 117)  BP: 138/73 (03-26-22 @ 12:40) (138/73 - 138/73)  BP(mean): 93 (03-26-22 @ 12:40) (93 - 93)  RR: 28 (03-26-22 @ 14:00) (22 - 37)  SpO2: 100% (03-26-22 @ 14:00) (97% - 100%)  Neck: No palpable thyroid nodules.  CVS: S1S2, No murmurs  Respiratory: No wheezing, no crepitations  GI: Abdomen soft, bowel sounds positive  Musculoskeletal:  edema lower extremities.     Diagnostics

## 2022-03-26 NOTE — PHYSICAL THERAPY INITIAL EVALUATION ADULT - GENERAL OBSERVATIONS, REHAB EVAL
Pt received seated in chair, A&Ox3, +4L O2 via NC, +external pacer, +darden, +central line, +IVL, +A-line, +cardiac monitor, agreeable to physical therapy yaima priest 45 min.

## 2022-03-27 LAB
ALBUMIN SERPL ELPH-MCNC: 4.1 G/DL — SIGNIFICANT CHANGE UP (ref 3.3–5)
ALP SERPL-CCNC: 104 U/L — SIGNIFICANT CHANGE UP (ref 40–120)
ALT FLD-CCNC: 33 U/L — SIGNIFICANT CHANGE UP (ref 10–45)
ANION GAP SERPL CALC-SCNC: 15 MMOL/L — SIGNIFICANT CHANGE UP (ref 5–17)
AST SERPL-CCNC: 18 U/L — SIGNIFICANT CHANGE UP (ref 10–40)
BILIRUB SERPL-MCNC: 0.8 MG/DL — SIGNIFICANT CHANGE UP (ref 0.2–1.2)
BUN SERPL-MCNC: 14 MG/DL — SIGNIFICANT CHANGE UP (ref 7–23)
CALCIUM SERPL-MCNC: 9.2 MG/DL — SIGNIFICANT CHANGE UP (ref 8.4–10.5)
CHLORIDE SERPL-SCNC: 99 MMOL/L — SIGNIFICANT CHANGE UP (ref 96–108)
CO2 SERPL-SCNC: 25 MMOL/L — SIGNIFICANT CHANGE UP (ref 22–31)
CREAT SERPL-MCNC: 0.56 MG/DL — SIGNIFICANT CHANGE UP (ref 0.5–1.3)
EGFR: 109 ML/MIN/1.73M2 — SIGNIFICANT CHANGE UP
GLUCOSE BLDC GLUCOMTR-MCNC: 105 MG/DL — HIGH (ref 70–99)
GLUCOSE BLDC GLUCOMTR-MCNC: 108 MG/DL — HIGH (ref 70–99)
GLUCOSE BLDC GLUCOMTR-MCNC: 125 MG/DL — HIGH (ref 70–99)
GLUCOSE BLDC GLUCOMTR-MCNC: 146 MG/DL — HIGH (ref 70–99)
GLUCOSE SERPL-MCNC: 129 MG/DL — HIGH (ref 70–99)
HCT VFR BLD CALC: 34.4 % — LOW (ref 39–50)
HGB BLD-MCNC: 11 G/DL — LOW (ref 13–17)
MAGNESIUM SERPL-MCNC: 2.3 MG/DL — SIGNIFICANT CHANGE UP (ref 1.6–2.6)
MCHC RBC-ENTMCNC: 28.9 PG — SIGNIFICANT CHANGE UP (ref 27–34)
MCHC RBC-ENTMCNC: 32 GM/DL — SIGNIFICANT CHANGE UP (ref 32–36)
MCV RBC AUTO: 90.3 FL — SIGNIFICANT CHANGE UP (ref 80–100)
NRBC # BLD: 0 /100 WBCS — SIGNIFICANT CHANGE UP (ref 0–0)
PHOSPHATE SERPL-MCNC: 3 MG/DL — SIGNIFICANT CHANGE UP (ref 2.5–4.5)
PLATELET # BLD AUTO: 191 K/UL — SIGNIFICANT CHANGE UP (ref 150–400)
POTASSIUM SERPL-MCNC: 4 MMOL/L — SIGNIFICANT CHANGE UP (ref 3.5–5.3)
POTASSIUM SERPL-SCNC: 4 MMOL/L — SIGNIFICANT CHANGE UP (ref 3.5–5.3)
PROT SERPL-MCNC: 6.5 G/DL — SIGNIFICANT CHANGE UP (ref 6–8.3)
RBC # BLD: 3.81 M/UL — LOW (ref 4.2–5.8)
RBC # FLD: 13.7 % — SIGNIFICANT CHANGE UP (ref 10.3–14.5)
SODIUM SERPL-SCNC: 139 MMOL/L — SIGNIFICANT CHANGE UP (ref 135–145)
WBC # BLD: 17.52 K/UL — HIGH (ref 3.8–10.5)
WBC # FLD AUTO: 17.52 K/UL — HIGH (ref 3.8–10.5)

## 2022-03-27 PROCEDURE — 99232 SBSQ HOSP IP/OBS MODERATE 35: CPT

## 2022-03-27 PROCEDURE — 71045 X-RAY EXAM CHEST 1 VIEW: CPT | Mod: 26

## 2022-03-27 RX ORDER — ACETAMINOPHEN 500 MG
1000 TABLET ORAL ONCE
Refills: 0 | Status: COMPLETED | OUTPATIENT
Start: 2022-03-27 | End: 2022-03-27

## 2022-03-27 RX ADMIN — OXYCODONE HYDROCHLORIDE 10 MILLIGRAM(S): 5 TABLET ORAL at 21:00

## 2022-03-27 RX ADMIN — ENOXAPARIN SODIUM 40 MILLIGRAM(S): 100 INJECTION SUBCUTANEOUS at 05:45

## 2022-03-27 RX ADMIN — SENNA PLUS 2 TABLET(S): 8.6 TABLET ORAL at 21:08

## 2022-03-27 RX ADMIN — Medication 500 MILLIGRAM(S): at 05:46

## 2022-03-27 RX ADMIN — Medication 40 MILLIGRAM(S): at 05:46

## 2022-03-27 RX ADMIN — Medication 400 MILLIGRAM(S): at 19:56

## 2022-03-27 RX ADMIN — Medication 1.25 MICROGRAM(S)/KG/MIN: at 03:56

## 2022-03-27 RX ADMIN — DEXMEDETOMIDINE HYDROCHLORIDE IN 0.9% SODIUM CHLORIDE 4.18 MICROGRAM(S)/KG/HR: 4 INJECTION INTRAVENOUS at 03:55

## 2022-03-27 RX ADMIN — Medication 11 UNIT(S): at 07:42

## 2022-03-27 RX ADMIN — Medication 11 UNIT(S): at 16:12

## 2022-03-27 RX ADMIN — GABAPENTIN 100 MILLIGRAM(S): 400 CAPSULE ORAL at 13:38

## 2022-03-27 RX ADMIN — Medication 400 MILLIGRAM(S): at 13:38

## 2022-03-27 RX ADMIN — Medication 20 MILLIGRAM(S): at 21:48

## 2022-03-27 RX ADMIN — Medication 1.25 MICROGRAM(S)/KG/MIN: at 07:42

## 2022-03-27 RX ADMIN — Medication 1000 MILLIGRAM(S): at 14:00

## 2022-03-27 RX ADMIN — GABAPENTIN 100 MILLIGRAM(S): 400 CAPSULE ORAL at 21:08

## 2022-03-27 RX ADMIN — Medication 81 MILLIGRAM(S): at 11:33

## 2022-03-27 RX ADMIN — GABAPENTIN 100 MILLIGRAM(S): 400 CAPSULE ORAL at 05:45

## 2022-03-27 RX ADMIN — SPIRONOLACTONE 25 MILLIGRAM(S): 25 TABLET, FILM COATED ORAL at 05:46

## 2022-03-27 RX ADMIN — Medication 1000 MILLIGRAM(S): at 04:06

## 2022-03-27 RX ADMIN — PANTOPRAZOLE SODIUM 40 MILLIGRAM(S): 20 TABLET, DELAYED RELEASE ORAL at 05:46

## 2022-03-27 RX ADMIN — Medication 1000 MILLIGRAM(S): at 21:18

## 2022-03-27 RX ADMIN — Medication 20 MILLIGRAM(S): at 09:11

## 2022-03-27 RX ADMIN — Medication 11 UNIT(S): at 11:50

## 2022-03-27 RX ADMIN — Medication 400 MILLIGRAM(S): at 03:55

## 2022-03-27 RX ADMIN — OXYCODONE HYDROCHLORIDE 10 MILLIGRAM(S): 5 TABLET ORAL at 09:00

## 2022-03-27 RX ADMIN — OXYCODONE HYDROCHLORIDE 10 MILLIGRAM(S): 5 TABLET ORAL at 07:43

## 2022-03-27 RX ADMIN — Medication 1.25 MICROGRAM(S)/KG/MIN: at 21:48

## 2022-03-27 RX ADMIN — ATORVASTATIN CALCIUM 80 MILLIGRAM(S): 80 TABLET, FILM COATED ORAL at 21:08

## 2022-03-27 RX ADMIN — OXYCODONE HYDROCHLORIDE 10 MILLIGRAM(S): 5 TABLET ORAL at 19:58

## 2022-03-27 RX ADMIN — Medication 500 MILLIGRAM(S): at 17:18

## 2022-03-27 RX ADMIN — INSULIN GLARGINE 34 UNIT(S): 100 INJECTION, SOLUTION SUBCUTANEOUS at 21:07

## 2022-03-27 NOTE — CHART NOTE - NSCHARTNOTEFT_GEN_A_CORE
Patient continues to refuse to be seen. 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   Chart check done. Agree with uptitration of diuretics. Should aim for goal 24h I/O balance of negative 1- 2 liters. Eventually should add ARNi/SGLT2i for GDMT optimization.

## 2022-03-27 NOTE — PROGRESS NOTE ADULT - PROBLEM SELECTOR PLAN 1
Will continue current insulin regimen for now. Will continue monitoring  blood sugars, will Follow up.  Will start insulin pen injection teaching, patient may get DC home on insulin.  Patient counseled for compliance with consistent low carb diet.

## 2022-03-27 NOTE — PROGRESS NOTE ADULT - ASSESSMENT
Assessment  DMT2: 66y Male with newly dx DM T2 with hyperglycemia, A1C 11.4%, was not taking any hypoglycemic agents, was on IV insulin, switched to basal bolus insulin, blood sugars improving, no hypoglycemic episodes, eating full meals, ambulating.  CAD: s/p CABG, on medications, no chest pain, stable, monitored.  Cardiogenic shock: On meds, improving, monitored..  HLD: On statin.      Shoaib Montaño MD  Cell: [phone]  Office: [phone]

## 2022-03-27 NOTE — PROGRESS NOTE ADULT - SUBJECTIVE AND OBJECTIVE BOX
Chief complaint    Patient is a 66y old  Male who presents with a chief complaint of Chest pressure/pain (27 Mar 2022 07:53)   Review of systems  Patient in bed, appears comfortable.    Labs and Fingersticks  CAPILLARY BLOOD GLUCOSE  236 (26 Mar 2022 17:00)  193 (26 Mar 2022 12:00)      POCT Blood Glucose.: 108 mg/dL (27 Mar 2022 06:19)  POCT Blood Glucose.: 135 mg/dL (26 Mar 2022 21:22)  POCT Blood Glucose.: 236 mg/dL (26 Mar 2022 16:05)  POCT Blood Glucose.: 193 mg/dL (26 Mar 2022 11:57)      Anion Gap, Serum: 15 (03-27 @ 01:09)  Anion Gap, Serum: 9 (03-26 @ 00:47)      Calcium, Total Serum: 9.2 (03-27 @ 01:09)  Calcium, Total Serum: 8.2 *L* (03-26 @ 00:47)  Albumin, Serum: 4.1 (03-27 @ 01:09)  Albumin, Serum: 3.2 *L* (03-26 @ 00:47)    Alanine Aminotransferase (ALT/SGPT): 33 (03-27 @ 01:09)  Alanine Aminotransferase (ALT/SGPT): 34 (03-26 @ 00:47)  Alkaline Phosphatase, Serum: 104 (03-27 @ 01:09)  Alkaline Phosphatase, Serum: 82 (03-26 @ 00:47)  Aspartate Aminotransferase (AST/SGOT): 18 (03-27 @ 01:09)  Aspartate Aminotransferase (AST/SGOT): 22 (03-26 @ 00:47)        03-27    139  |  99  |  14  ----------------------------<  129<H>  4.0   |  25  |  0.56    Ca    9.2      27 Mar 2022 01:09  Phos  3.0     03-27  Mg     2.3     03-27    TPro  6.5  /  Alb  4.1  /  TBili  0.8  /  DBili  x   /  AST  18  /  ALT  33  /  AlkPhos  104  03-27                        11.0   17.52 )-----------( 191      ( 27 Mar 2022 01:09 )             34.4     Medications  MEDICATIONS  (STANDING):  ascorbic acid 500 milliGRAM(s) Oral two times a day  aspirin enteric coated 81 milliGRAM(s) Oral daily  atorvastatin 80 milliGRAM(s) Oral at bedtime  bisacodyl Suppository 10 milliGRAM(s) Rectal once  chlorhexidine 2% Cloths 1 Application(s) Topical daily  dextrose 40% Gel 15 Gram(s) Oral once  dextrose 50% Injectable 50 milliLiter(s) IV Push every 15 minutes  DOBUTamine Infusion 0.5 MICROgram(s)/kG/Min (1.25 mL/Hr) IV Continuous <Continuous>  enoxaparin Injectable 40 milliGRAM(s) SubCutaneous every 24 hours  gabapentin 100 milliGRAM(s) Oral every 8 hours  glucagon  Injectable 1 milliGRAM(s) IntraMuscular once  insulin glargine Injectable (LANTUS) 34 Unit(s) SubCutaneous at bedtime  insulin lispro (ADMELOG) corrective regimen sliding scale   SubCutaneous three times a day before meals  insulin lispro (ADMELOG) corrective regimen sliding scale   SubCutaneous at bedtime  insulin lispro Injectable (ADMELOG) 11 Unit(s) SubCutaneous three times a day before meals  pantoprazole    Tablet 40 milliGRAM(s) Oral before breakfast  polyethylene glycol 3350 17 Gram(s) Oral daily  senna 2 Tablet(s) Oral at bedtime  spironolactone 25 milliGRAM(s) Oral daily  torsemide 20 milliGRAM(s) Oral every 12 hours      Physical Exam  General: Patient comfortable in bed  Vital Signs Last 12 Hrs  T(F): 98.3 (03-27-22 @ 08:00), Max: 99 (03-27-22 @ 00:00)  HR: 105 (03-27-22 @ 11:00) (87 - 107)  BP: 114/65 (03-27-22 @ 11:00) (99/59 - 130/70)  BP(mean): 82 (03-27-22 @ 11:00) (74 - 95)  RR: 20 (03-27-22 @ 11:00) (17 - 27)  SpO2: 94% (03-27-22 @ 11:00) (92% - 98%)  Neck: No palpable thyroid nodules.  CVS: S1S2, No murmurs  Respiratory: No wheezing, no crepitations  GI: Abdomen soft, bowel sounds positive  Musculoskeletal:  edema lower extremities.     Diagnostics

## 2022-03-27 NOTE — PROGRESS NOTE ADULT - SUBJECTIVE AND OBJECTIVE BOX
NATALIA TENA  MRN-[id number]  Patient is a 66y old  Male who presents with a chief complaint of Chest pressure/pain (26 Mar 2022 14:56)    HPI:  65 y/o Male with no PMH presented to [place] complaining of chest heaviness/pain since last night, associated with diaphoresis and emesis. Patient took 324 ASA this morning. EKG revealed new LBBB. Patient was transferred to [facility], Brilinta loaded and cardiac cath performed for NSTEMI which demonstrated severe multivessel CAD. Patient was in cardiogenic shock, and IABP was placed and Dobutamine drip was started. TTE demonstrated EF of 20-25%, RV size normal but decreased function. No LV thrombus. Patient was transferred and admitted to CTU for further management under Dr. Abad. (19 Mar 2022 15:35)      Surgery/Hospital course:  3/19 Chest pain/ pressure from last night,  this AM, Brilinta loaded, NSTEMI w/ new LBB, Cath: MVD, cardiogenic shock, IABP placed on . TTE: EF 20-25%, severe global systolic dysfnx, RV size normal with decreased fxn, no LV thrombus  3/21 TTE (EF 35%, normal RV, mild TR/MR)   3/25 R Fem IABP/ Venous sheath d/c    Today:  - Continue inotropic support   - Continue to diurese with lasix and aldactone     REVIEW OF SYSTEMS:  unable to obtain due to patient sedation     Physical Exam:  Vital Signs Last 24 Hrs  T(C): 36.8 (27 Mar 2022 04:00), Max: 37.5 (26 Mar 2022 16:00)  T(F): 98.3 (27 Mar 2022 04:00), Max: 99.5 (26 Mar 2022 16:00)  HR: 100 (27 Mar 2022 07:00) (87 - 117)  BP: 129/64 (27 Mar 2022 07:00) (99/59 - 139/76)  BP(mean): 90 (27 Mar 2022 07:00) (74 - 101)  RR: 20 (27 Mar 2022 07:00) (17 - 37)  SpO2: 97% (27 Mar 2022 07:00) (80% - 100%)  Gen:  Awake, alert   CNS: non focal 	  Neck: no JVD  RES : clear , no wheezing                       CVS: Regular  rhythm. Normal S1/S2  Abd: Soft, non-distended. Bowel sounds present.  Skin: No rash.  Ext:  no edema, A Line  ============================I/O===========================   I&O's Detail    26 Mar 2022 07:  -  27 Mar 2022 07:00  --------------------------------------------------------  IN:    Dexmedetomidine: 5.2 mL    Dexmedetomidine: 33.6 mL    DOBUTamine: 41.8 mL    DOBUTamine: 17.5 mL    DOBUTamine: 7.2 mL    Oral Fluid: 1320 mL    sodium chloride 0.9%: 60 mL  Total IN: 1485.3 mL    OUT:    Indwelling Catheter - Urethral (mL): 855 mL    Insulin: 0 mL    Voided (mL): 1175 mL  Total OUT: 2030 mL    Total NET: -544.7 mL      27 Mar 2022 07:01  -  27 Mar 2022 07:53  --------------------------------------------------------  IN:  Total IN: 0 mL    OUT:    Voided (mL): 800 mL  Total OUT: 800 mL    Total NET: -800 mL        ============================ LABS =========================                        11.0   17.52 )-----------( 191      ( 27 Mar 2022 01:09 )             34.4     03-27    139  |  99  |  14  ----------------------------<  129<H>  4.0   |  25  |  0.56    Ca    9.2      27 Mar 2022 01:09  Phos  3.0     -  Mg     2.3         TPro  6.5  /  Alb  4.1  /  TBili  0.8  /  DBili  x   /  AST  18  /  ALT  33  /  AlkPhos  104      LIVER FUNCTIONS - ( 27 Mar 2022 01:09 )  Alb: 4.1 g/dL / Pro: 6.5 g/dL / ALK PHOS: 104 U/L / ALT: 33 U/L / AST: 18 U/L / GGT: x             ABG - ( 26 Mar 2022 16:27 )  pH, Arterial: 7.46  pH, Blood: x     /  pCO2: 41    /  pO2: 104   / HCO3: 29    / Base Excess: 4.9   /  SaO2: 99.4                ======================Micro/Rad/Cardio=================  Culture: Reviewed   CXR: Reviewed  Echo:Reviewed  ======================================================  PAST MEDICAL & SURGICAL HISTORY:  No pertinent past medical history    No significant past surgical history      ====================ASSESSMENT ==============  CAD s/p CABG 3/23   NSTEMI   Multivessel CAD   Complicated with cardiogenic shock   S/P IABP placement for severe LV Systolic failure  Lactic acidosis  Hyperglycemia     Plan:  ====================== NEUROLOGY=====================  Patient is sedated with IV Precedex  Tylenol, Gabapentin, Oxycodone PRN for analgesia     acetaminophen     Tablet .. 650 milliGRAM(s) Oral every 6 hours PRN Mild Pain (1 - 3)  dexMEDEtomidine Infusion 0.2 MICROgram(s)/kG/Hr (4.18 mL/Hr) IV Continuous <Continuous>  gabapentin 100 milliGRAM(s) Oral every 8 hours  oxyCODONE    IR 5 milliGRAM(s) Oral every 4 hours PRN Moderate Pain (4 - 6)  oxyCODONE    IR 10 milliGRAM(s) Oral every 4 hours PRN Severe Pain (7 - 10)    ==================== RESPIRATORY======================  Stable on RA, SpO2 97%  Encourage incentive spirometry, continue pulse ox monitoring, follow ABGs     ====================CARDIOVASCULAR==================  CAD s/p CABG 3/23   NSTEMI, multivessle CAD S/P IABP placement for severe LV Systolic failure  Continue invasive hemodynamic monitoring   Lactate 0.8, continue trending   IABP DC'D 3/25   Inotropic support with IV dobutamine   TTE: EF 20-25%, severe global systolic dysfnx, RV size normal with decreased fxn, no LV thrombus  ASA/ Lipitor for CAD    DOBUTamine Infusion 0.5 MICROgram(s)/kG/Min (1.25 mL/Hr) IV Continuous <Continuous>  atorvastatin 80 milliGRAM(s) Oral at bedtime  aspirin enteric coated 81 milliGRAM(s) Oral daily    ===================HEMATOLOGIC/ONC ===================  Continue to monitor H&H/Plts     VTE prophylaxis, enoxaparin Injectable 40 milliGRAM(s) SubCutaneous every 24 hours    ===================== RENAL =========================  Continue monitoring I&Os and BUN/Cr  Replete lytes PRN. Keep K> 4 and Mg >2  Continue to diurese with PO lasix and aldactone     furosemide    Tablet 40 milliGRAM(s) Oral daily  spironolactone 25 milliGRAM(s) Oral daily    ==================== GASTROINTESTINAL===================  Tolerating a consistent carb diet   Bowel regimen with Senna     ascorbic acid 500 milliGRAM(s) Oral two times a day  bisacodyl Suppository 10 milliGRAM(s) Rectal once  GI prophylaxis, pantoprazole    Tablet 40 milliGRAM(s) Oral before breakfast  polyethylene glycol 3350 17 Gram(s) Oral daily  senna 2 Tablet(s) Oral at bedtime    =======================    ENDOCRINE  =====================  Stress hyperglycemia, continue glucose control with ISS + Lantus     dextrose 40% Gel 15 Gram(s) Oral once  dextrose 50% Injectable 50 milliLiter(s) IV Push every 15 minutes  glucagon  Injectable 1 milliGRAM(s) IntraMuscular once  insulin glargine Injectable (LANTUS) 34 Unit(s) SubCutaneous at bedtime  insulin lispro (ADMELOG) corrective regimen sliding scale   SubCutaneous three times a day before meals  insulin lispro (ADMELOG) corrective regimen sliding scale   SubCutaneous at bedtime  insulin lispro Injectable (ADMELOG) 11 Unit(s) SubCutaneous three times a day before meals    ========================INFECTIOUS DISEASE================  Afebrile, WBC rising 15.97 -> 17.52  Continue trending WBC and monitoring fever curve         By signing my name below, I, Kelli Beckman, attest that this documentation has been prepared under the direction and in the presence of Niki Cabrera MD.  Electronically signed: Lionel Rasmussen, 22 @ 07:53    I, Niki Cabrera, personally performed the services described in this documentation. all medical record entries made by the lionel were at my direction and in my presence. I have reviewed the chart and agree that the record reflects my personal performance and is accurate and complete  Electronically signed: Niki Cabrera, 22 @ 07:53       NATALIA TENA  MRN-[id number]  Patient is a 66y old  Male who presents with a chief complaint of Chest pressure/pain (26 Mar 2022 14:56)    HPI:  65 y/o Male with no PMH presented to [place] complaining of chest heaviness/pain since last night, associated with diaphoresis and emesis. Patient took 324 ASA this morning. EKG revealed new LBBB. Patient was transferred to [facility], Brilinta loaded and cardiac cath performed for NSTEMI which demonstrated severe multivessel CAD. Patient was in cardiogenic shock, and IABP was placed and Dobutamine drip was started. TTE demonstrated EF of 20-25%, RV size normal but decreased function. No LV thrombus. Patient was transferred and admitted to CTU for further management under Dr. Abad. (19 Mar 2022 15:35)      Surgery/Hospital course:  3/19 Chest pain/ pressure from last night,  this AM, Brilinta loaded, NSTEMI w/ new LBB, Cath: MVD, cardiogenic shock, IABP placed on . TTE: EF 20-25%, severe global systolic dysfnx, RV size normal with decreased fxn, no LV thrombus  3/21 TTE (EF 35%, normal RV, mild TR/MR)   3/25 R Fem IABP/ Venous sheath d/c    Today:  - Continue inotropic support   - Continue to diurese with lasix and aldactone     REVIEW OF SYSTEMS:  incisional pain  no sob  no fever       Physical Exam:  Vital Signs Last 24 Hrs  T(C): 36.8 (27 Mar 2022 04:00), Max: 37.5 (26 Mar 2022 16:00)  T(F): 98.3 (27 Mar 2022 04:00), Max: 99.5 (26 Mar 2022 16:00)  HR: 100 (27 Mar 2022 07:00) (87 - 117)  BP: 129/64 (27 Mar 2022 07:00) (99/59 - 139/76)  BP(mean): 90 (27 Mar 2022 07:00) (74 - 101)  RR: 20 (27 Mar 2022 07:00) (17 - 37)  SpO2: 97% (27 Mar 2022 07:00) (80% - 100%)  Gen:  Awake, alert   CNS: non focal 	  Neck: no JVD  RES : clear , no wheezing                       CVS: Regular  rhythm. Normal S1/S2  Abd: Soft, non-distended. Bowel sounds present.  Skin: No rash.  Ext:  no edema, A Line  ============================I/O===========================   I&O's Detail    26 Mar 2022 07:  -  27 Mar 2022 07:00  --------------------------------------------------------  IN:    Dexmedetomidine: 5.2 mL    Dexmedetomidine: 33.6 mL    DOBUTamine: 41.8 mL    DOBUTamine: 17.5 mL    DOBUTamine: 7.2 mL    Oral Fluid: 1320 mL    sodium chloride 0.9%: 60 mL  Total IN: 1485.3 mL    OUT:    Indwelling Catheter - Urethral (mL): 855 mL    Insulin: 0 mL    Voided (mL): 1175 mL  Total OUT: 2030 mL    Total NET: -544.7 mL      27 Mar 2022 07:01  -  27 Mar 2022 07:53  --------------------------------------------------------  IN:  Total IN: 0 mL    OUT:    Voided (mL): 800 mL  Total OUT: 800 mL    Total NET: -800 mL        ============================ LABS =========================                        11.0   17.52 )-----------( 191      ( 27 Mar 2022 01:09 )             34.4     03-27    139  |  99  |  14  ----------------------------<  129<H>  4.0   |  25  |  0.56    Ca    9.2      27 Mar 2022 01:09  Phos  3.0     -  Mg     2.3         TPro  6.5  /  Alb  4.1  /  TBili  0.8  /  DBili  x   /  AST  18  /  ALT  33  /  AlkPhos  104      LIVER FUNCTIONS - ( 27 Mar 2022 01:09 )  Alb: 4.1 g/dL / Pro: 6.5 g/dL / ALK PHOS: 104 U/L / ALT: 33 U/L / AST: 18 U/L / GGT: x             ABG - ( 26 Mar 2022 16:27 )  pH, Arterial: 7.46  pH, Blood: x     /  pCO2: 41    /  pO2: 104   / HCO3: 29    / Base Excess: 4.9   /  SaO2: 99.4                ======================Micro/Rad/Cardio=================  Culture: Reviewed   CXR: Reviewed  Echo:Reviewed  ======================================================  PAST MEDICAL & SURGICAL HISTORY:  No pertinent past medical history    No significant past surgical history      ====================ASSESSMENT ==============  NSTEMI  ,CAD s/p CABG 3/23 ;  cardiogenic shock , S/P IABP placement for severe LV Systolic failure  acute systolic CHF  hemodynamic instability, on Dobutamine   fluid overload  diabetes mellitus 2  Hyperglycemia     Plan:  ====================== NEUROLOGY=====================  Patient is sedated with IV Precedex  Tylenol, Gabapentin, Oxycodone PRN for analgesia     acetaminophen     Tablet .. 650 milliGRAM(s) Oral every 6 hours PRN Mild Pain (1 - 3)  dexMEDEtomidine Infusion 0.2 MICROgram(s)/kG/Hr (4.18 mL/Hr) IV Continuous <Continuous>  gabapentin 100 milliGRAM(s) Oral every 8 hours  oxyCODONE    IR 5 milliGRAM(s) Oral every 4 hours PRN Moderate Pain (4 - 6)  oxyCODONE    IR 10 milliGRAM(s) Oral every 4 hours PRN Severe Pain (7 - 10)    ==================== RESPIRATORY======================  Stable on RA, SpO2 97%  Encourage incentive spirometry, continue pulse ox monitoring, follow ABGs     ====================CARDIOVASCULAR==================  CAD s/p CABG 3/23   NSTEMI, multivessle CAD S/P IABP placement for severe LV Systolic failure  Continue invasive hemodynamic monitoring   Lactate 0.8, continue trending   IABP DC'D 3/25   Inotropic support with IV dobutamine   TTE: EF 20-25%, severe global systolic dysfnx, RV size normal with decreased fxn, no LV thrombus  ASA/ Lipitor for CAD    DOBUTamine Infusion 0.5 MICROgram(s)/kG/Min (1.25 mL/Hr) IV Continuous <Continuous>  atorvastatin 80 milliGRAM(s) Oral at bedtime  aspirin enteric coated 81 milliGRAM(s) Oral daily    ===================HEMATOLOGIC/ONC ===================  Continue to monitor H&H/Plts     VTE prophylaxis, enoxaparin Injectable 40 milliGRAM(s) SubCutaneous every 24 hours    ===================== RENAL =========================  Continue monitoring I&Os and BUN/Cr  Replete lytes PRN. Keep K> 4 and Mg >2  Continue to diurese with PO lasix and aldactone     furosemide    Tablet 40 milliGRAM(s) Oral daily  spironolactone 25 milliGRAM(s) Oral daily    ==================== GASTROINTESTINAL===================  Tolerating a consistent carb diet   Bowel regimen with Senna     ascorbic acid 500 milliGRAM(s) Oral two times a day  bisacodyl Suppository 10 milliGRAM(s) Rectal once  GI prophylaxis, pantoprazole    Tablet 40 milliGRAM(s) Oral before breakfast  polyethylene glycol 3350 17 Gram(s) Oral daily  senna 2 Tablet(s) Oral at bedtime    =======================    ENDOCRINE  =====================  Stress hyperglycemia, continue glucose control with ISS + Lantus     dextrose 40% Gel 15 Gram(s) Oral once  dextrose 50% Injectable 50 milliLiter(s) IV Push every 15 minutes  glucagon  Injectable 1 milliGRAM(s) IntraMuscular once  insulin glargine Injectable (LANTUS) 34 Unit(s) SubCutaneous at bedtime  insulin lispro (ADMELOG) corrective regimen sliding scale   SubCutaneous three times a day before meals  insulin lispro (ADMELOG) corrective regimen sliding scale   SubCutaneous at bedtime  insulin lispro Injectable (ADMELOG) 11 Unit(s) SubCutaneous three times a day before meals    ========================INFECTIOUS DISEASE================  Afebrile, WBC rising 15.97 -> 17.52  Continue trending WBC and monitoring fever curve     Patient requires continuous monitoring with:  bedside rhythm monitoring, arterial line, pulse oximetry,  intermittent blood gas analysis.  Care plan discussed with ICU care team.  patient remain critical; required more than usual post op care; I have spent 35 minutes providing non routine post op care, revaluated multiple times.      By signing my name below, I, Kelli Beckman, attest that this documentation has been prepared under the direction and in the presence of Niki Cabrera MD.  Electronically signed: Evan Rasmussen, 22 @ 07:53    I, Niki Cabrera, personally performed the services described in this documentation. all medical record entries made by the scribe were at my direction and in my presence. I have reviewed the chart and agree that the record reflects my personal performance and is accurate and complete  Electronically signed: Niki Cabrera, 22 @ 07:53

## 2022-03-28 LAB
ALBUMIN SERPL ELPH-MCNC: 3.8 G/DL — SIGNIFICANT CHANGE UP (ref 3.3–5)
ALP SERPL-CCNC: 98 U/L — SIGNIFICANT CHANGE UP (ref 40–120)
ALT FLD-CCNC: 33 U/L — SIGNIFICANT CHANGE UP (ref 10–45)
ANION GAP SERPL CALC-SCNC: 12 MMOL/L — SIGNIFICANT CHANGE UP (ref 5–17)
APPEARANCE UR: CLEAR — SIGNIFICANT CHANGE UP
AST SERPL-CCNC: 18 U/L — SIGNIFICANT CHANGE UP (ref 10–40)
BASOPHILS # BLD AUTO: 0.17 K/UL — SIGNIFICANT CHANGE UP (ref 0–0.2)
BASOPHILS NFR BLD AUTO: 0.9 % — SIGNIFICANT CHANGE UP (ref 0–2)
BILIRUB SERPL-MCNC: 0.7 MG/DL — SIGNIFICANT CHANGE UP (ref 0.2–1.2)
BILIRUB UR-MCNC: NEGATIVE — SIGNIFICANT CHANGE UP
BUN SERPL-MCNC: 18 MG/DL — SIGNIFICANT CHANGE UP (ref 7–23)
CALCIUM SERPL-MCNC: 9.2 MG/DL — SIGNIFICANT CHANGE UP (ref 8.4–10.5)
CHLORIDE SERPL-SCNC: 98 MMOL/L — SIGNIFICANT CHANGE UP (ref 96–108)
CO2 SERPL-SCNC: 27 MMOL/L — SIGNIFICANT CHANGE UP (ref 22–31)
COLOR SPEC: SIGNIFICANT CHANGE UP
CREAT SERPL-MCNC: 0.82 MG/DL — SIGNIFICANT CHANGE UP (ref 0.5–1.3)
DIFF PNL FLD: NEGATIVE — SIGNIFICANT CHANGE UP
EGFR: 97 ML/MIN/1.73M2 — SIGNIFICANT CHANGE UP
EOSINOPHIL # BLD AUTO: 0 K/UL — SIGNIFICANT CHANGE UP (ref 0–0.5)
EOSINOPHIL NFR BLD AUTO: 0 % — SIGNIFICANT CHANGE UP (ref 0–6)
GIANT PLATELETS BLD QL SMEAR: PRESENT — SIGNIFICANT CHANGE UP
GLUCOSE BLDC GLUCOMTR-MCNC: 106 MG/DL — HIGH (ref 70–99)
GLUCOSE BLDC GLUCOMTR-MCNC: 114 MG/DL — HIGH (ref 70–99)
GLUCOSE BLDC GLUCOMTR-MCNC: 172 MG/DL — HIGH (ref 70–99)
GLUCOSE BLDC GLUCOMTR-MCNC: 86 MG/DL — SIGNIFICANT CHANGE UP (ref 70–99)
GLUCOSE SERPL-MCNC: 113 MG/DL — HIGH (ref 70–99)
GLUCOSE UR QL: NEGATIVE — SIGNIFICANT CHANGE UP
HCT VFR BLD CALC: 35.8 % — LOW (ref 39–50)
HGB BLD-MCNC: 11.5 G/DL — LOW (ref 13–17)
KETONES UR-MCNC: SIGNIFICANT CHANGE UP
LEUKOCYTE ESTERASE UR-ACNC: NEGATIVE — SIGNIFICANT CHANGE UP
LYMPHOCYTES # BLD AUTO: 33.3 % — SIGNIFICANT CHANGE UP (ref 13–44)
LYMPHOCYTES # BLD AUTO: 6.46 K/UL — HIGH (ref 1–3.3)
MAGNESIUM SERPL-MCNC: 2.2 MG/DL — SIGNIFICANT CHANGE UP (ref 1.6–2.6)
MANUAL SMEAR VERIFICATION: SIGNIFICANT CHANGE UP
MCHC RBC-ENTMCNC: 28.9 PG — SIGNIFICANT CHANGE UP (ref 27–34)
MCHC RBC-ENTMCNC: 32.1 GM/DL — SIGNIFICANT CHANGE UP (ref 32–36)
MCV RBC AUTO: 89.9 FL — SIGNIFICANT CHANGE UP (ref 80–100)
METAMYELOCYTES # FLD: 0.9 % — HIGH (ref 0–0)
MONOCYTES # BLD AUTO: 0.68 K/UL — SIGNIFICANT CHANGE UP (ref 0–0.9)
MONOCYTES NFR BLD AUTO: 3.5 % — SIGNIFICANT CHANGE UP (ref 2–14)
NEUTROPHILS # BLD AUTO: 11.91 K/UL — HIGH (ref 1.8–7.4)
NEUTROPHILS NFR BLD AUTO: 61.4 % — SIGNIFICANT CHANGE UP (ref 43–77)
NITRITE UR-MCNC: NEGATIVE — SIGNIFICANT CHANGE UP
PH UR: 7.5 — SIGNIFICANT CHANGE UP (ref 5–8)
PHOSPHATE SERPL-MCNC: 4 MG/DL — SIGNIFICANT CHANGE UP (ref 2.5–4.5)
PLAT MORPH BLD: NORMAL — SIGNIFICANT CHANGE UP
PLATELET # BLD AUTO: 409 K/UL — HIGH (ref 150–400)
POLYCHROMASIA BLD QL SMEAR: SLIGHT — SIGNIFICANT CHANGE UP
POTASSIUM SERPL-MCNC: 3.8 MMOL/L — SIGNIFICANT CHANGE UP (ref 3.5–5.3)
POTASSIUM SERPL-SCNC: 3.8 MMOL/L — SIGNIFICANT CHANGE UP (ref 3.5–5.3)
PROCALCITONIN SERPL-MCNC: 0.13 NG/ML — HIGH (ref 0.02–0.1)
PROT SERPL-MCNC: 6.4 G/DL — SIGNIFICANT CHANGE UP (ref 6–8.3)
PROT UR-MCNC: NEGATIVE — SIGNIFICANT CHANGE UP
RBC # BLD: 3.98 M/UL — LOW (ref 4.2–5.8)
RBC # FLD: 13.5 % — SIGNIFICANT CHANGE UP (ref 10.3–14.5)
RBC BLD AUTO: SIGNIFICANT CHANGE UP
SODIUM SERPL-SCNC: 137 MMOL/L — SIGNIFICANT CHANGE UP (ref 135–145)
SP GR SPEC: 1.01 — SIGNIFICANT CHANGE UP (ref 1.01–1.02)
UROBILINOGEN FLD QL: NEGATIVE — SIGNIFICANT CHANGE UP
WBC # BLD: 19.4 K/UL — HIGH (ref 3.8–10.5)
WBC # FLD AUTO: 19.4 K/UL — HIGH (ref 3.8–10.5)

## 2022-03-28 PROCEDURE — 99291 CRITICAL CARE FIRST HOUR: CPT

## 2022-03-28 PROCEDURE — 71045 X-RAY EXAM CHEST 1 VIEW: CPT | Mod: 26

## 2022-03-28 RX ORDER — POTASSIUM CHLORIDE 20 MEQ
20 PACKET (EA) ORAL ONCE
Refills: 0 | Status: COMPLETED | OUTPATIENT
Start: 2022-03-28 | End: 2022-03-28

## 2022-03-28 RX ORDER — SODIUM CHLORIDE 9 MG/ML
3 INJECTION INTRAMUSCULAR; INTRAVENOUS; SUBCUTANEOUS EVERY 8 HOURS
Refills: 0 | Status: DISCONTINUED | OUTPATIENT
Start: 2022-03-28 | End: 2022-03-30

## 2022-03-28 RX ORDER — METOPROLOL TARTRATE 50 MG
25 TABLET ORAL DAILY
Refills: 0 | Status: DISCONTINUED | OUTPATIENT
Start: 2022-03-28 | End: 2022-03-30

## 2022-03-28 RX ORDER — SPIRONOLACTONE 25 MG/1
25 TABLET, FILM COATED ORAL
Refills: 0 | Status: DISCONTINUED | OUTPATIENT
Start: 2022-03-28 | End: 2022-03-29

## 2022-03-28 RX ADMIN — Medication 81 MILLIGRAM(S): at 11:53

## 2022-03-28 RX ADMIN — POLYETHYLENE GLYCOL 3350 17 GRAM(S): 17 POWDER, FOR SOLUTION ORAL at 11:53

## 2022-03-28 RX ADMIN — SODIUM CHLORIDE 3 MILLILITER(S): 9 INJECTION INTRAMUSCULAR; INTRAVENOUS; SUBCUTANEOUS at 21:35

## 2022-03-28 RX ADMIN — INSULIN GLARGINE 34 UNIT(S): 100 INJECTION, SOLUTION SUBCUTANEOUS at 22:12

## 2022-03-28 RX ADMIN — ATORVASTATIN CALCIUM 80 MILLIGRAM(S): 80 TABLET, FILM COATED ORAL at 21:11

## 2022-03-28 RX ADMIN — GABAPENTIN 100 MILLIGRAM(S): 400 CAPSULE ORAL at 06:02

## 2022-03-28 RX ADMIN — Medication 20 MILLIGRAM(S): at 06:02

## 2022-03-28 RX ADMIN — PANTOPRAZOLE SODIUM 40 MILLIGRAM(S): 20 TABLET, DELAYED RELEASE ORAL at 06:01

## 2022-03-28 RX ADMIN — Medication 25 MILLIGRAM(S): at 10:00

## 2022-03-28 RX ADMIN — SENNA PLUS 2 TABLET(S): 8.6 TABLET ORAL at 21:10

## 2022-03-28 RX ADMIN — Medication 1: at 11:54

## 2022-03-28 RX ADMIN — Medication 500 MILLIGRAM(S): at 06:02

## 2022-03-28 RX ADMIN — OXYCODONE HYDROCHLORIDE 10 MILLIGRAM(S): 5 TABLET ORAL at 19:40

## 2022-03-28 RX ADMIN — Medication 11 UNIT(S): at 07:38

## 2022-03-28 RX ADMIN — OXYCODONE HYDROCHLORIDE 10 MILLIGRAM(S): 5 TABLET ORAL at 08:32

## 2022-03-28 RX ADMIN — Medication 20 MILLIEQUIVALENT(S): at 02:28

## 2022-03-28 RX ADMIN — SPIRONOLACTONE 25 MILLIGRAM(S): 25 TABLET, FILM COATED ORAL at 06:02

## 2022-03-28 RX ADMIN — OXYCODONE HYDROCHLORIDE 10 MILLIGRAM(S): 5 TABLET ORAL at 07:38

## 2022-03-28 RX ADMIN — Medication 11 UNIT(S): at 11:54

## 2022-03-28 RX ADMIN — SODIUM CHLORIDE 3 MILLILITER(S): 9 INJECTION INTRAMUSCULAR; INTRAVENOUS; SUBCUTANEOUS at 13:02

## 2022-03-28 RX ADMIN — SPIRONOLACTONE 25 MILLIGRAM(S): 25 TABLET, FILM COATED ORAL at 17:18

## 2022-03-28 RX ADMIN — ENOXAPARIN SODIUM 40 MILLIGRAM(S): 100 INJECTION SUBCUTANEOUS at 06:02

## 2022-03-28 RX ADMIN — GABAPENTIN 100 MILLIGRAM(S): 400 CAPSULE ORAL at 13:18

## 2022-03-28 RX ADMIN — Medication 11 UNIT(S): at 17:24

## 2022-03-28 RX ADMIN — OXYCODONE HYDROCHLORIDE 10 MILLIGRAM(S): 5 TABLET ORAL at 20:30

## 2022-03-28 RX ADMIN — Medication 20 MILLIGRAM(S): at 17:17

## 2022-03-28 NOTE — PROGRESS NOTE ADULT - SUBJECTIVE AND OBJECTIVE BOX
VITAL SIGNS    Telemetry:    sr   90    Vital Signs Last 24 Hrs  T(C): 36.7 (22 @ 11:00), Max: 37.2 (22 @ 00:00)  T(F): 98 (22 @ 11:00), Max: 99 (22 @ 00:00)  HR: 103 (22 @ 11:00) (94 - 116)  BP: 111/70 (22 @ 11:00) (98/59 - 140/71)  RR: 18 (22 @ 11:00) (15 - 35)  SpO2: 96% (22 @ 11:00) (84% - 96%)                   Daily     Daily Weight in k (28 Mar 2022 00:00)      Bilirubin Total, Serum: 0.7 mg/dL ( @ 00:12)    CAPILLARY BLOOD GLUCOSE      POCT Blood Glucose.: 172 mg/dL (28 Mar 2022 11:30)  POCT Blood Glucose.: 114 mg/dL (28 Mar 2022 06:16)  POCT Blood Glucose.: 125 mg/dL (27 Mar 2022 20:52)  POCT Blood Glucose.: 105 mg/dL (27 Mar 2022 16:09)                        PHYSICAL EXAM    Neurology: alert and oriented x 3, moves all extremities with no defecits  CV :  RRR  Sternal Wound :  CDI , Stable  Lungs:   CTA B/L  Abdomen: soft, nontender, nondistended, positive bowel sounds  Extremities:     plus one pedal edema

## 2022-03-28 NOTE — PROGRESS NOTE ADULT - ASSESSMENT
65 y/o Male with no PMH presented to [place] complaining of chest heaviness/pain since last night, associated with diaphoresis and emesis. EKG revealed new LBBB. Patient was transferred to [facility] and cardiac cath performed for NSTEMI which demonstrated severe multivessel CAD. Patient was in cardiogenic shock, and IABP was placed and Dobutamine drip was started. TTE demonstrated EF of 20-25%, RV size normal but decreased function. No LV thrombus. s/p IABP. Patient was transferred and admitted to CTU for further management. He is now s/p 3V CABG on 3/23 (LAD-LIMA, SVG-OM, SVG-PDA), approaching euvolemia and now tolerating oral GDMT.    BNP 7087, Tn 3580   Cardiac studies:   LHC 3/19/2022: Multivessel CAD  Echo 3/19/2022: EF 20-25%, no LV thrombus, minimal MR, RV normal size with decreased function.     PAC 3/20/2022 AM: CO/CI 5.7/2.5; , PA 32/15/22; CVP 5; MAP 70;   PAC 3/22/2022 AM: CO/CI 7.5/3.3; , PA 46/20/32, CVP 7, Wedge 22, PA Sat 73%, Aug , MAP 71,  on IABP 1:1,  1, Milrinone 0.4  PAC 3/24/2022 AM: CO/CI 6.7/3.3; SVR 1355, RA 18, PA 40/14/26 on IABP 1:1, milrinone 0.4, dobutamine 3, vasopressin 0.08

## 2022-03-28 NOTE — PROGRESS NOTE ADULT - PROBLEM SELECTOR PLAN 1
Will continue current insulin regimen for now. Will continue monitoring FS and FU.  Suggest to start insulin pen injection teaching, patient may get DC home on insulin.  Patient counseled for compliance with consistent low carb diet and exercise as tolerated outpatient.

## 2022-03-28 NOTE — PROGRESS NOTE ADULT - PROBLEM SELECTOR PLAN 1
- Intermacs 2/SCAI stage C  - tMCS: s/p IABP, on 1:1, now off  - Inotropes: now off  - Not a candidate for heart tx due to uncontrolled DM. Can consider LVAD if needed. Pt is very anxious and was not interested in discussing. We also asked our VAD coordinators to discuss and pt as well as his family refused.  - resolved; see HF management below

## 2022-03-28 NOTE — PROGRESS NOTE ADULT - CRITICAL CARE SERVICES PROVIDED
Patient is critically ill, requiring critical care services.

## 2022-03-28 NOTE — PROGRESS NOTE ADULT - PROBLEM SELECTOR PLAN 3
- In setting of ischemic cardiomyopathy LVEF 35% LVIDd 5.1 cm  - off  as of this AM; trend perfusion labs: lactate, Cr  - con't Aldactone 25mg  - start Toprol XL 25mg  - fill escalate GDMT as tolerated  - con't torsemide 20mg BID, goal neg 1L

## 2022-03-28 NOTE — PROGRESS NOTE ADULT - ATTENDING COMMENTS
66/M presented with chest pain , LHC showed 3V CAD and in cardiogenic shcok, on IABP, now S/P CABG x3, off inotropes.   Appears euvolemic  dobutamine stopped this AM.   BP stable  started on Metoprolol xl 25mg   cont Caruthers  can add farxiga  Lasix PRN  will add ARNI in next few days  repeat TTE    DM, HBa1c of 11.4  Management per ICU  Endocrine following    will cont follow

## 2022-03-28 NOTE — PROGRESS NOTE ADULT - SUBJECTIVE AND OBJECTIVE BOX
Patient seen and examined at the bedside.    Remained critically ill on continuous ICU monitoring.    OBJECTIVE:  Vital Signs Last 24 Hrs  T(C): 37.1 (28 Mar 2022 04:00), Max: 37.2 (28 Mar 2022 00:00)  T(F): 98.7 (28 Mar 2022 04:00), Max: 99 (28 Mar 2022 00:00)  HR: 111 (28 Mar 2022 07:00) (94 - 118)  BP: 122/79 (28 Mar 2022 07:00) (98/59 - 140/71)  BP(mean): 96 (28 Mar 2022 07:00) (73 - 99)  RR: 20 (28 Mar 2022 07:00) (15 - 24)  SpO2: 89% (28 Mar 2022 07:00) (84% - 96%)      Physical Exam:   General: NAD   Neurology: nonfocal   Eyes: bilateral pupils equal and reactive   ENT/Neck: Neck supple, trachea midline, No JVD   Respiratory: Clear bilaterally   CV: S1S2, no murmurs        [x] Sternal dressing        [x] Sinus tachy [x] Temporary pacing - VVI   Abdominal: Soft, NT, ND +BS,   Extremities: 1-2+ pedal edema noted, + peripheral pulses   Skin: No Rashes, Hematoma, Ecchymosis                           Assessment:  65 y/o Male with no PMHX     Admitted STEMI w/ new LBBB /  Multivessel CAD / post infarct cardiogenic shock   S/P IABP placement for severe LV Systolic failure on 3/19 (removed on 3/25), s/p C3L on 3/23   Hypovolemic shock   Post op respiratory insufficiency    Pulmonary edema   Acute blood loss anemia   Stress hyperglycemia     Plan:   ***Neuro***  [x] Nonfocal    Post operative neuro assessment     ***Cardiovascular***  Invasive hemodynamic monitoring, assess perfusion indices   ST / Hct 35.8% / Lactate 0.8   Continuos reassessment of hemodynamics   Rate control with Lopressor   [x] VTE ppx with Lovenox   [x]  ASA [x] Statin   Serial EKG and cardiac enzymes     ***Pulmonary***  [x] RA   Encourage incentive spirometry, continue pulse ox monitoring, follow ABGs     ***GI***  [x]  Diet:  Consistent carb diet   [x] Protonix    Bowel regimen with Miralax and Senna     ***Renal***  Continue to monitor I/Os, BUN/Creatinine.   Replete lytes PRN  Jovel present [x]  negative   Diuresis with Aldactone and Torsemide     ***ID***  No active antibiotic coverage      ***Endocrine***  [x] Stress Hyperglycemia : HbA1c 11.4%               - [x] ISS [x] Lantus              - Need tight glycemic control to prevent wound infection.        Patient requires continuous monitoring with bedside rhythm monitoring, pulse oximetry monitoring, and continuous central venous and arterial pressure monitoring; and intermittent blood gas analysis. Care plan discussed with the ICU care team.   Patient remained critical, at risk for life threatening decompensation.    I have spent 30 minutes providing critical care management to this patient.    By signing my name below, I, Cheryl Jackson, attest that this documentation has been prepared under the direction and in the presence of Jeffrey Fang MD   Electronically signed: Evan Wilks, 03-28-22 @ 07:45    I, Jeffrey Fang, personally performed the services described in this documentation. all medical record entries made by the scribe were at my direction and in my presence. I have reviewed the chart and agree that the record reflects my personal performance and is accurate and complete  Electronically signed: Jeffrey Fang MD  Patient seen and examined at the bedside.    Remained critically ill on continuous ICU monitoring.    OBJECTIVE:  Vital Signs Last 24 Hrs  T(C): 37.1 (28 Mar 2022 04:00), Max: 37.2 (28 Mar 2022 00:00)  T(F): 98.7 (28 Mar 2022 04:00), Max: 99 (28 Mar 2022 00:00)  HR: 111 (28 Mar 2022 07:00) (94 - 118)  BP: 122/79 (28 Mar 2022 07:00) (98/59 - 140/71)  BP(mean): 96 (28 Mar 2022 07:00) (73 - 99)  RR: 20 (28 Mar 2022 07:00) (15 - 24)  SpO2: 89% (28 Mar 2022 07:00) (84% - 96%)      Physical Exam:   General: NAD   Neurology: nonfocal   Eyes: bilateral pupils equal and reactive   ENT/Neck: Neck supple, trachea midline, No JVD   Respiratory: Clear bilaterally   CV: S1S2, no murmurs        [x] Sternal dressing        [x] Sinus tachy [x] Temporary pacing - isolated this AM   Abdominal: Soft, NT, ND +BS,   Extremities: 1-2+ pedal edema noted, + peripheral pulses   Skin: No Rashes, Hematoma, Ecchymosis                           Assessment:  67 y/o Male with no PMHX     Admitted STEMI w/ new LBBB /  Multivessel CAD / post infarct cardiogenic shock   S/P IABP placement for severe LV Systolic failure on 3/19 (removed on 3/25), s/p C3L on 3/23   Hypovolemic shock   Post op respiratory insufficiency    Pulmonary edema   Acute blood loss anemia   Stress hyperglycemia     Plan:   ***Neuro***  [x] Nonfocal    Post operative neuro assessment   Ambulate as tolerated     ***Cardiovascular***  Invasive hemodynamic monitoring, assess perfusion indices   ST / Hct 35.8% / Lactate 0.8   [x] Dobutamine weaned to off overnight   Continuos reassessment of hemodynamics   Started Toprol 25mg qd this AM for rate control   [x] VTE ppx with Lovenox   [x]  ASA [x] Statin   Serial EKG and cardiac enzymes     ***Pulmonary***  [x] RA   Encourage incentive spirometry, continue pulse ox monitoring, follow ABGs     ***GI***  [x]  Diet:  Consistent carb diet   [x] Protonix    Bowel regimen with Miralax and Senna     ***Renal***  Continue to monitor I/Os, BUN/Creatinine.   Replete lytes PRN  Jovel present [x]  negative   Diuresis with Torsemide, Aldactone increased qd->BID     ***ID***  No active antibiotic coverage    BCx sent overnight, will f/u     ***Endocrine***  [x] Stress Hyperglycemia : HbA1c 11.4%               - [x] ISS [x] Lantus              - Need tight glycemic control to prevent wound infection.        Patient requires continuous monitoring with bedside rhythm monitoring, pulse oximetry monitoring, and continuous central venous and arterial pressure monitoring; and intermittent blood gas analysis. Care plan discussed with the ICU care team.   Patient remained critical, at risk for life threatening decompensation.    I have spent 30 minutes providing critical care management to this patient.    By signing my name below, I, Cheryl Jackson, attest that this documentation has been prepared under the direction and in the presence of Jeffrey Fang MD   Electronically signed: Evan Wilks, 03-28-22 @ 07:45    I, Jeffrey Fang, personally performed the services described in this documentation. all medical record entries made by the pauloibmehnaz were at my direction and in my presence. I have reviewed the chart and agree that the record reflects my personal performance and is accurate and complete  Electronically signed: Jeffrey Fang MD

## 2022-03-28 NOTE — PROGRESS NOTE ADULT - SUBJECTIVE AND OBJECTIVE BOX
Patient seen and examined at bedside.    Overnight Events: no events, issues or complaints    Review of Systems:  REVIEW OF SYSTEMS:  CONSTITUTIONAL: No weakness, fevers or chills  EYES/ENT: No visual changes;  No dysphagia  NECK: No pain or stiffness  RESPIRATORY: No cough, wheezing, hemoptysis; No shortness of breath  CARDIOVASCULAR: No chest pain or palpitations; No lower extremity edema  GASTROINTESTINAL: No abdominal or epigastric pain. No nausea, vomiting, or hematemesis; No diarrhea or constipation. No melena or hematochezia.  BACK: No back pain  GENITOURINARY: No dysuria, frequency or hematuria  NEUROLOGICAL: No numbness or weakness  SKIN: No itching, burning, rashes, or lesions   All other review of systems is negative unless indicated above.    [x ] All other systems negative  [ ] Unable to assess ROS due to    Current Meds:  acetaminophen     Tablet .. 650 milliGRAM(s) Oral every 6 hours PRN  aspirin enteric coated 81 milliGRAM(s) Oral daily  atorvastatin 80 milliGRAM(s) Oral at bedtime  bisacodyl Suppository 10 milliGRAM(s) Rectal once  dextrose 40% Gel 15 Gram(s) Oral once  dextrose 50% Injectable 50 milliLiter(s) IV Push every 15 minutes  enoxaparin Injectable 40 milliGRAM(s) SubCutaneous every 24 hours  gabapentin 100 milliGRAM(s) Oral every 8 hours  glucagon  Injectable 1 milliGRAM(s) IntraMuscular once  insulin glargine Injectable (LANTUS) 34 Unit(s) SubCutaneous at bedtime  insulin lispro (ADMELOG) corrective regimen sliding scale   SubCutaneous three times a day before meals  insulin lispro (ADMELOG) corrective regimen sliding scale   SubCutaneous at bedtime  insulin lispro Injectable (ADMELOG) 11 Unit(s) SubCutaneous three times a day before meals  metoprolol succinate ER 25 milliGRAM(s) Oral daily  oxyCODONE    IR 5 milliGRAM(s) Oral every 4 hours PRN  oxyCODONE    IR 10 milliGRAM(s) Oral every 4 hours PRN  pantoprazole    Tablet 40 milliGRAM(s) Oral before breakfast  polyethylene glycol 3350 17 Gram(s) Oral daily  senna 2 Tablet(s) Oral at bedtime  sodium chloride 0.9% lock flush 3 milliLiter(s) IV Push every 8 hours  spironolactone 25 milliGRAM(s) Oral two times a day  torsemide 20 milliGRAM(s) Oral every 12 hours      PAST MEDICAL & SURGICAL HISTORY:  No pertinent past medical history    No significant past surgical history        Vitals:  T(F): 98 (03-28), Max: 99 (03-28)  HR: 103 (03-28) (94 - 118)  BP: 111/70 (03-28) (98/59 - 140/71)  RR: 18 (03-28)  SpO2: 96% (03-28)  I&O's Summary    27 Mar 2022 07:01  -  28 Mar 2022 07:00  --------------------------------------------------------  IN: 1660.4 mL / OUT: 3700 mL / NET: -2039.6 mL    28 Mar 2022 07:01  -  28 Mar 2022 12:52  --------------------------------------------------------  IN: 280 mL / OUT: 880 mL / NET: -600 mL        Physical Exam:  Appearance: No acute distress; well appearing  Eyes: PERRL, EOMI, pink conjunctiva  HENT: Normal oral mucosa  Cardiovascular: tachycardic, S1, S2, no murmurs, rubs, or gallops; no edema; no JVD  Respiratory: Clear to auscultation bilaterally  Gastrointestinal: soft, non-tender, non-distended with normal bowel sounds  Musculoskeletal: No clubbing; no joint deformity   Neurologic: Non-focal  Lymphatic: No lymphadenopathy  Psychiatry: AAOx3, mood & affect appropriate  Skin: chest incision site c/d/i                          11.5   19.40 )-----------( 409      ( 28 Mar 2022 00:14 )             35.8     03-28    137  |  98  |  18  ----------------------------<  113<H>  3.8   |  27  |  0.82    Ca    9.2      28 Mar 2022 00:12  Phos  4.0     03-28  Mg     2.2     03-28    TPro  6.4  /  Alb  3.8  /  TBili  0.7  /  DBili  x   /  AST  18  /  ALT  33  /  AlkPhos  98  03-28

## 2022-03-28 NOTE — PROGRESS NOTE ADULT - ASSESSMENT
65 y/o Male with no PMH presented to [facility] complaining of chest heaviness/pain since last night, associated with diaphoresis and emesis. Patient took 324 ASA this morning. EKG revealed new LBBB. Patient was transferred to [facility], Brilinta loaded and cardiac cath performed for NSTEMI which demonstrated severe multivessel CAD. Patient was in cardiogenic shock, and IABP placed,  Dobutamine drip was started. TTE demonstrated EF of 20-25%, RV size normal but decreased function. No LV thrombus.  3/23   c3L   no significant history,   EF   25 percent , post op ,  elevated creatinine  3/25  IABP out  3/27    off  3/28     trf to 2 mckeon  ,   NSR,     Toprol   asa   ator,   endo following for a1c 13,   HF   following for EF 25 percent

## 2022-03-28 NOTE — PROGRESS NOTE ADULT - ASSESSMENT
Assessment  DMT2: 66y Male with newly dx DM T2 with hyperglycemia, A1C 11.4%, was not taking any hypoglycemic agents, now on basal bolus insulin, blood sugars improving and in acceptable range, no hypoglycemic episodes, eating full meals, NAD, being transferred out of ctu.  CAD: s/p CABG, on medications, no chest pain, stable, monitored.  Cardiogenic shock: On meds, improving, monitored..  HLD: On statin.      Shoaib Montaño MD  Cell: [phone]  Office: [phone]       Assessment  DMT2: 66y Male with newly dx DM T2 with hyperglycemia, A1C 11.4%, was not taking any hypoglycemic agents, now on basal bolus insulin, blood sugars improving and in acceptable range, no hypoglycemic episodes,  eating full meals, NAD, being transferred out of ctu.  CAD: s/p CABG, on medications, no chest pain, stable, monitored.  Cardiogenic shock: On meds, improving, monitored..  HLD: On statin.      Shoaib Montaño MD  Cell: [phone]  Office: [phone]

## 2022-03-28 NOTE — PROGRESS NOTE ADULT - SUBJECTIVE AND OBJECTIVE BOX
Chief complaint  Patient is a 66y old  Male who presents with a chief complaint of Chest pressure/pain (28 Mar 2022 07:45)   Review of systems  Patient in bed, looks comfortable, no hypoglycemic episodes.    Labs and Fingersticks  CAPILLARY BLOOD GLUCOSE      POCT Blood Glucose.: 114 mg/dL (28 Mar 2022 06:16)  POCT Blood Glucose.: 125 mg/dL (27 Mar 2022 20:52)  POCT Blood Glucose.: 105 mg/dL (27 Mar 2022 16:09)  POCT Blood Glucose.: 146 mg/dL (27 Mar 2022 11:39)      Anion Gap, Serum: 12 (03-28 @ 00:12)  Anion Gap, Serum: 15 (03-27 @ 01:09)      Calcium, Total Serum: 9.2 (03-28 @ 00:12)  Calcium, Total Serum: 9.2 (03-27 @ 01:09)  Albumin, Serum: 3.8 (03-28 @ 00:12)  Albumin, Serum: 4.1 (03-27 @ 01:09)    Alanine Aminotransferase (ALT/SGPT): 33 (03-28 @ 00:12)  Alanine Aminotransferase (ALT/SGPT): 33 (03-27 @ 01:09)  Alkaline Phosphatase, Serum: 98 (03-28 @ 00:12)  Alkaline Phosphatase, Serum: 104 (03-27 @ 01:09)  Aspartate Aminotransferase (AST/SGOT): 18 (03-28 @ 00:12)  Aspartate Aminotransferase (AST/SGOT): 18 (03-27 @ 01:09)        03-28    137  |  98  |  18  ----------------------------<  113<H>  3.8   |  27  |  0.82    Ca    9.2      28 Mar 2022 00:12  Phos  4.0     03-28  Mg     2.2     03-28    TPro  6.4  /  Alb  3.8  /  TBili  0.7  /  DBili  x   /  AST  18  /  ALT  33  /  AlkPhos  98  03-28                        11.5   19.40 )-----------( 409      ( 28 Mar 2022 00:14 )             35.8     Medications  MEDICATIONS  (STANDING):  aspirin enteric coated 81 milliGRAM(s) Oral daily  atorvastatin 80 milliGRAM(s) Oral at bedtime  bisacodyl Suppository 10 milliGRAM(s) Rectal once  dextrose 40% Gel 15 Gram(s) Oral once  dextrose 50% Injectable 50 milliLiter(s) IV Push every 15 minutes  enoxaparin Injectable 40 milliGRAM(s) SubCutaneous every 24 hours  gabapentin 100 milliGRAM(s) Oral every 8 hours  glucagon  Injectable 1 milliGRAM(s) IntraMuscular once  insulin glargine Injectable (LANTUS) 34 Unit(s) SubCutaneous at bedtime  insulin lispro (ADMELOG) corrective regimen sliding scale   SubCutaneous three times a day before meals  insulin lispro (ADMELOG) corrective regimen sliding scale   SubCutaneous at bedtime  insulin lispro Injectable (ADMELOG) 11 Unit(s) SubCutaneous three times a day before meals  metoprolol succinate ER 25 milliGRAM(s) Oral daily  pantoprazole    Tablet 40 milliGRAM(s) Oral before breakfast  polyethylene glycol 3350 17 Gram(s) Oral daily  senna 2 Tablet(s) Oral at bedtime  sodium chloride 0.9% lock flush 3 milliLiter(s) IV Push every 8 hours  spironolactone 25 milliGRAM(s) Oral two times a day  torsemide 20 milliGRAM(s) Oral every 12 hours      Physical Exam  General: Patient comfortable in bed  Vital Signs Last 12 Hrs  T(F): 98.6 (03-28-22 @ 08:00), Max: 99 (03-28-22 @ 00:00)  HR: 109 (03-28-22 @ 10:00) (94 - 114)  BP: 113/68 (03-28-22 @ 10:00) (98/59 - 122/79)  BP(mean): 85 (03-28-22 @ 10:00) (73 - 96)  RR: 33 (03-28-22 @ 10:00) (20 - 35)  SpO2: 90% (03-28-22 @ 10:00) (89% - 96%)  Neck: No palpable thyroid nodules.  CVS: S1S2, No murmurs  Respiratory: No wheezing, no crepitations  GI: Abdomen soft, bowel sounds positive  Musculoskeletal:  edema lower extremities.   Skin: No skin rashes, no ecchymosis    Diagnostics             Chief complaint  Patient is a 66y old  Male who presents with a chief complaint of Chest pressure/pain (28 Mar 2022 07:45)   Review of systems  Patient in bed, looks comfortable, no hypoglycemic episodes.    Labs and Fingersticks  CAPILLARY BLOOD GLUCOSE      POCT Blood Glucose.: 114 mg/dL (28 Mar 2022 06:16)  POCT Blood Glucose.: 125 mg/dL (27 Mar 2022 20:52)  POCT Blood Glucose.: 105 mg/dL (27 Mar 2022 16:09)  POCT Blood Glucose.: 146 mg/dL (27 Mar 2022 11:39)      Anion Gap, Serum: 12 (03-28 @ 00:12)  Anion Gap, Serum: 15 (03-27 @ 01:09)      Calcium, Total Serum: 9.2 (03-28 @ 00:12)  Calcium, Total Serum: 9.2 (03-27 @ 01:09)  Albumin, Serum: 3.8 (03-28 @ 00:12)  Albumin, Serum: 4.1 (03-27 @ 01:09)    Alanine Aminotransferase (ALT/SGPT): 33 (03-28 @ 00:12)  Alanine Aminotransferase (ALT/SGPT): 33 (03-27 @ 01:09)  Alkaline Phosphatase, Serum: 98 (03-28 @ 00:12)  Alkaline Phosphatase, Serum: 104 (03-27 @ 01:09)  Aspartate Aminotransferase (AST/SGOT): 18 (03-28 @ 00:12)  Aspartate Aminotransferase (AST/SGOT): 18 (03-27 @ 01:09)        03-28    137  |  98  |  18  ----------------------------<  113<H>  3.8   |  27  |  0.82    Ca    9.2      28 Mar 2022 00:12  Phos  4.0     03-28  Mg     2.2     03-28    TPro  6.4  /  Alb  3.8  /  TBili  0.7  /  DBili  x   /  AST  18  /  ALT  33  /  AlkPhos  98  03-28                        11.5   19.40 )-----------( 409      ( 28 Mar 2022 00:14 )             35.8     Medications  MEDICATIONS  (STANDING):  aspirin enteric coated 81 milliGRAM(s) Oral daily  atorvastatin 80 milliGRAM(s) Oral at bedtime  bisacodyl Suppository 10 milliGRAM(s) Rectal once  dextrose 40% Gel 15 Gram(s) Oral once  dextrose 50% Injectable 50 milliLiter(s) IV Push every 15 minutes  enoxaparin Injectable 40 milliGRAM(s) SubCutaneous every 24 hours  gabapentin 100 milliGRAM(s) Oral every 8 hours  glucagon  Injectable 1 milliGRAM(s) IntraMuscular once  insulin glargine Injectable (LANTUS) 34 Unit(s) SubCutaneous at bedtime  insulin lispro (ADMELOG) corrective regimen sliding scale   SubCutaneous three times a day before meals  insulin lispro (ADMELOG) corrective regimen sliding scale   SubCutaneous at bedtime  insulin lispro Injectable (ADMELOG) 11 Unit(s) SubCutaneous three times a day before meals  metoprolol succinate ER 25 milliGRAM(s) Oral daily  pantoprazole    Tablet 40 milliGRAM(s) Oral before breakfast  polyethylene glycol 3350 17 Gram(s) Oral daily  senna 2 Tablet(s) Oral at bedtime  sodium chloride 0.9% lock flush 3 milliLiter(s) IV Push every 8 hours  spironolactone 25 milliGRAM(s) Oral two times a day  torsemide 20 milliGRAM(s) Oral every 12 hours      Physical Exam  General: Patient comfortable in bed  Vital Signs Last 12 Hrs  T(F): 98.6 (03-28-22 @ 08:00), Max: 99 (03-28-22 @ 00:00)  HR: 109 (03-28-22 @ 10:00) (94 - 114)  BP: 113/68 (03-28-22 @ 10:00) (98/59 - 122/79)  BP(mean): 85 (03-28-22 @ 10:00) (73 - 96)  RR: 33 (03-28-22 @ 10:00) (20 - 35)  SpO2: 90% (03-28-22 @ 10:00) (89% - 96%)  Neck: No palpable thyroid nodules.  CVS: S1S2, No murmurs  Respiratory: No wheezing, no crepitations  GI: Abdomen soft, bowel sounds positive  Musculoskeletal:  edema lower extremities.   Skin: No skin rashes, no ecchymosis    Diagnostics

## 2022-03-29 ENCOUNTER — TRANSCRIPTION ENCOUNTER (OUTPATIENT)
Age: 67
End: 2022-03-29

## 2022-03-29 LAB
ANION GAP SERPL CALC-SCNC: 12 MMOL/L — SIGNIFICANT CHANGE UP (ref 5–17)
BUN SERPL-MCNC: 20 MG/DL — SIGNIFICANT CHANGE UP (ref 7–23)
CALCIUM SERPL-MCNC: 9.3 MG/DL — SIGNIFICANT CHANGE UP (ref 8.4–10.5)
CHLORIDE SERPL-SCNC: 96 MMOL/L — SIGNIFICANT CHANGE UP (ref 96–108)
CO2 SERPL-SCNC: 28 MMOL/L — SIGNIFICANT CHANGE UP (ref 22–31)
CREAT SERPL-MCNC: 0.8 MG/DL — SIGNIFICANT CHANGE UP (ref 0.5–1.3)
EGFR: 98 ML/MIN/1.73M2 — SIGNIFICANT CHANGE UP
GLUCOSE BLDC GLUCOMTR-MCNC: 116 MG/DL — HIGH (ref 70–99)
GLUCOSE BLDC GLUCOMTR-MCNC: 152 MG/DL — HIGH (ref 70–99)
GLUCOSE BLDC GLUCOMTR-MCNC: 205 MG/DL — HIGH (ref 70–99)
GLUCOSE BLDC GLUCOMTR-MCNC: 79 MG/DL — SIGNIFICANT CHANGE UP (ref 70–99)
GLUCOSE SERPL-MCNC: 118 MG/DL — HIGH (ref 70–99)
HCT VFR BLD CALC: 34.7 % — LOW (ref 39–50)
HGB BLD-MCNC: 11.1 G/DL — LOW (ref 13–17)
MCHC RBC-ENTMCNC: 28.8 PG — SIGNIFICANT CHANGE UP (ref 27–34)
MCHC RBC-ENTMCNC: 32 GM/DL — SIGNIFICANT CHANGE UP (ref 32–36)
MCV RBC AUTO: 89.9 FL — SIGNIFICANT CHANGE UP (ref 80–100)
NRBC # BLD: 0 /100 WBCS — SIGNIFICANT CHANGE UP (ref 0–0)
PLATELET # BLD AUTO: 535 K/UL — HIGH (ref 150–400)
POTASSIUM SERPL-MCNC: 3.8 MMOL/L — SIGNIFICANT CHANGE UP (ref 3.5–5.3)
POTASSIUM SERPL-SCNC: 3.8 MMOL/L — SIGNIFICANT CHANGE UP (ref 3.5–5.3)
RBC # BLD: 3.86 M/UL — LOW (ref 4.2–5.8)
RBC # FLD: 13.7 % — SIGNIFICANT CHANGE UP (ref 10.3–14.5)
SARS-COV-2 RNA SPEC QL NAA+PROBE: SIGNIFICANT CHANGE UP
SODIUM SERPL-SCNC: 136 MMOL/L — SIGNIFICANT CHANGE UP (ref 135–145)
WBC # BLD: 18.25 K/UL — HIGH (ref 3.8–10.5)
WBC # FLD AUTO: 18.25 K/UL — HIGH (ref 3.8–10.5)

## 2022-03-29 PROCEDURE — 99233 SBSQ HOSP IP/OBS HIGH 50: CPT

## 2022-03-29 PROCEDURE — 71045 X-RAY EXAM CHEST 1 VIEW: CPT | Mod: 26

## 2022-03-29 PROCEDURE — 93306 TTE W/DOPPLER COMPLETE: CPT | Mod: 26

## 2022-03-29 RX ORDER — ACETAMINOPHEN 500 MG
650 TABLET ORAL EVERY 6 HOURS
Refills: 0 | Status: DISCONTINUED | OUTPATIENT
Start: 2022-03-29 | End: 2022-03-30

## 2022-03-29 RX ORDER — INSULIN LISPRO 100/ML
8 VIAL (ML) SUBCUTANEOUS
Refills: 0 | Status: DISCONTINUED | OUTPATIENT
Start: 2022-03-29 | End: 2022-03-30

## 2022-03-29 RX ORDER — INSULIN GLARGINE 100 [IU]/ML
28 INJECTION, SOLUTION SUBCUTANEOUS AT BEDTIME
Refills: 0 | Status: DISCONTINUED | OUTPATIENT
Start: 2022-03-29 | End: 2022-03-30

## 2022-03-29 RX ORDER — SPIRONOLACTONE 25 MG/1
25 TABLET, FILM COATED ORAL DAILY
Refills: 0 | Status: DISCONTINUED | OUTPATIENT
Start: 2022-03-30 | End: 2022-03-30

## 2022-03-29 RX ORDER — SACUBITRIL AND VALSARTAN 24; 26 MG/1; MG/1
1 TABLET, FILM COATED ORAL
Refills: 0 | Status: DISCONTINUED | OUTPATIENT
Start: 2022-03-29 | End: 2022-03-30

## 2022-03-29 RX ADMIN — SACUBITRIL AND VALSARTAN 1 TABLET(S): 24; 26 TABLET, FILM COATED ORAL at 17:32

## 2022-03-29 RX ADMIN — ENOXAPARIN SODIUM 40 MILLIGRAM(S): 100 INJECTION SUBCUTANEOUS at 05:45

## 2022-03-29 RX ADMIN — ATORVASTATIN CALCIUM 80 MILLIGRAM(S): 80 TABLET, FILM COATED ORAL at 22:17

## 2022-03-29 RX ADMIN — Medication 25 MILLIGRAM(S): at 05:46

## 2022-03-29 RX ADMIN — OXYCODONE HYDROCHLORIDE 10 MILLIGRAM(S): 5 TABLET ORAL at 07:46

## 2022-03-29 RX ADMIN — OXYCODONE HYDROCHLORIDE 10 MILLIGRAM(S): 5 TABLET ORAL at 08:16

## 2022-03-29 RX ADMIN — SODIUM CHLORIDE 3 MILLILITER(S): 9 INJECTION INTRAMUSCULAR; INTRAVENOUS; SUBCUTANEOUS at 05:40

## 2022-03-29 RX ADMIN — SODIUM CHLORIDE 3 MILLILITER(S): 9 INJECTION INTRAMUSCULAR; INTRAVENOUS; SUBCUTANEOUS at 14:24

## 2022-03-29 RX ADMIN — Medication 8 UNIT(S): at 17:32

## 2022-03-29 RX ADMIN — Medication 8 UNIT(S): at 12:13

## 2022-03-29 RX ADMIN — PANTOPRAZOLE SODIUM 40 MILLIGRAM(S): 20 TABLET, DELAYED RELEASE ORAL at 05:45

## 2022-03-29 RX ADMIN — Medication 11 UNIT(S): at 07:57

## 2022-03-29 RX ADMIN — Medication 650 MILLIGRAM(S): at 15:56

## 2022-03-29 RX ADMIN — SPIRONOLACTONE 25 MILLIGRAM(S): 25 TABLET, FILM COATED ORAL at 05:46

## 2022-03-29 RX ADMIN — SODIUM CHLORIDE 3 MILLILITER(S): 9 INJECTION INTRAMUSCULAR; INTRAVENOUS; SUBCUTANEOUS at 22:00

## 2022-03-29 RX ADMIN — Medication 20 MILLIGRAM(S): at 05:46

## 2022-03-29 RX ADMIN — SENNA PLUS 2 TABLET(S): 8.6 TABLET ORAL at 22:16

## 2022-03-29 RX ADMIN — OXYCODONE HYDROCHLORIDE 10 MILLIGRAM(S): 5 TABLET ORAL at 20:35

## 2022-03-29 RX ADMIN — Medication 81 MILLIGRAM(S): at 12:13

## 2022-03-29 RX ADMIN — Medication 2: at 12:12

## 2022-03-29 RX ADMIN — Medication 650 MILLIGRAM(S): at 15:26

## 2022-03-29 RX ADMIN — OXYCODONE HYDROCHLORIDE 10 MILLIGRAM(S): 5 TABLET ORAL at 19:35

## 2022-03-29 RX ADMIN — POLYETHYLENE GLYCOL 3350 17 GRAM(S): 17 POWDER, FOR SOLUTION ORAL at 12:13

## 2022-03-29 RX ADMIN — INSULIN GLARGINE 28 UNIT(S): 100 INJECTION, SOLUTION SUBCUTANEOUS at 22:17

## 2022-03-29 NOTE — PROGRESS NOTE ADULT - PROBLEM SELECTOR PLAN 1
- Intermacs 2/SCAI stage C  - tMCS: s/p IABP, on 1:1, now off  - Inotropes: off  - Not a candidate for heart tx due to uncontrolled DM. Can consider LVAD if needed. Pt is very anxious and was not interested in discussing. We also asked our VAD coordinators to discuss and pt as well as his family refused.  - resolved; see HF management below

## 2022-03-29 NOTE — PROGRESS NOTE ADULT - SUBJECTIVE AND OBJECTIVE BOX
Chief complaint  Patient is a 66y old  Male who presents with a chief complaint of sp   c3l   preop IABP (29 Mar 2022 09:20)   Review of systems  No acute events, no hypoglycemic episodes.    Labs and Fingersticks  CAPILLARY BLOOD GLUCOSE      POCT Blood Glucose.: 116 mg/dL (29 Mar 2022 07:43)  POCT Blood Glucose.: 106 mg/dL (28 Mar 2022 21:24)  POCT Blood Glucose.: 172 mg/dL (28 Mar 2022 11:30)      Anion Gap, Serum: 12 (03-29 @ 05:32)  Anion Gap, Serum: 12 (03-28 @ 00:12)      Calcium, Total Serum: 9.3 (03-29 @ 05:32)  Calcium, Total Serum: 9.2 (03-28 @ 00:12)  Albumin, Serum: 3.8 (03-28 @ 00:12)    Alanine Aminotransferase (ALT/SGPT): 33 (03-28 @ 00:12)  Alkaline Phosphatase, Serum: 98 (03-28 @ 00:12)  Aspartate Aminotransferase (AST/SGOT): 18 (03-28 @ 00:12)        03-29    136  |  96  |  20  ----------------------------<  118<H>  3.8   |  28  |  0.80    Ca    9.3      29 Mar 2022 05:32  Phos  4.0     03-28  Mg     2.2     03-28    TPro  6.4  /  Alb  3.8  /  TBili  0.7  /  DBili  x   /  AST  18  /  ALT  33  /  AlkPhos  98  03-28                        11.1   18.25 )-----------( 535      ( 29 Mar 2022 05:32 )             34.7     Medications  MEDICATIONS  (STANDING):  aspirin enteric coated 81 milliGRAM(s) Oral daily  atorvastatin 80 milliGRAM(s) Oral at bedtime  bisacodyl Suppository 10 milliGRAM(s) Rectal once  dextrose 40% Gel 15 Gram(s) Oral once  dextrose 50% Injectable 50 milliLiter(s) IV Push every 15 minutes  enoxaparin Injectable 40 milliGRAM(s) SubCutaneous every 24 hours  glucagon  Injectable 1 milliGRAM(s) IntraMuscular once  insulin glargine Injectable (LANTUS) 28 Unit(s) SubCutaneous at bedtime  insulin lispro (ADMELOG) corrective regimen sliding scale   SubCutaneous three times a day before meals  insulin lispro (ADMELOG) corrective regimen sliding scale   SubCutaneous at bedtime  insulin lispro Injectable (ADMELOG) 8 Unit(s) SubCutaneous three times a day before meals  metoprolol succinate ER 25 milliGRAM(s) Oral daily  pantoprazole    Tablet 40 milliGRAM(s) Oral before breakfast  polyethylene glycol 3350 17 Gram(s) Oral daily  senna 2 Tablet(s) Oral at bedtime  sodium chloride 0.9% lock flush 3 milliLiter(s) IV Push every 8 hours      Physical Exam  General: Patient comfortable in bed  Vital Signs Last 12 Hrs  T(F): 97.7 (03-29-22 @ 09:05), Max: 98.3 (03-29-22 @ 05:00)  HR: 100 (03-29-22 @ 09:05) (97 - 100)  BP: 102/66 (03-29-22 @ 09:05) (102/66 - 111/70)  BP(mean): 83 (03-29-22 @ 05:00) (83 - 83)  RR: 17 (03-29-22 @ 09:05) (17 - 18)  SpO2: 96% (03-29-22 @ 09:05) (95% - 96%)     Chief complaint  Patient is a 66y old  Male who presents with a chief complaint of sp   c3l   preop IABP (29 Mar 2022 09:20)   Review of systems  No acute events, no hypoglycemic episodes.    Labs and Fingersticks  CAPILLARY BLOOD GLUCOSE      POCT Blood Glucose.: 116 mg/dL (29 Mar 2022 07:43)  POCT Blood Glucose.: 106 mg/dL (28 Mar 2022 21:24)  POCT Blood Glucose.: 172 mg/dL (28 Mar 2022 11:30)      Anion Gap, Serum: 12 (03-29 @ 05:32)  Anion Gap, Serum: 12 (03-28 @ 00:12)      Calcium, Total Serum: 9.3 (03-29 @ 05:32)  Calcium, Total Serum: 9.2 (03-28 @ 00:12)  Albumin, Serum: 3.8 (03-28 @ 00:12)    Alanine Aminotransferase (ALT/SGPT): 33 (03-28 @ 00:12)  Alkaline Phosphatase, Serum: 98 (03-28 @ 00:12)  Aspartate Aminotransferase (AST/SGOT): 18 (03-28 @ 00:12)        03-29    136  |  96  |  20  ----------------------------<  118<H>  3.8   |  28  |  0.80    Ca    9.3      29 Mar 2022 05:32  Phos  4.0     03-28  Mg     2.2     03-28    TPro  6.4  /  Alb  3.8  /  TBili  0.7  /  DBili  x   /  AST  18  /  ALT  33  /  AlkPhos  98  03-28                        11.1   18.25 )-----------( 535      ( 29 Mar 2022 05:32 )             34.7     Medications  MEDICATIONS  (STANDING):  aspirin enteric coated 81 milliGRAM(s) Oral daily  atorvastatin 80 milliGRAM(s) Oral at bedtime  bisacodyl Suppository 10 milliGRAM(s) Rectal once  dextrose 40% Gel 15 Gram(s) Oral once  dextrose 50% Injectable 50 milliLiter(s) IV Push every 15 minutes  enoxaparin Injectable 40 milliGRAM(s) SubCutaneous every 24 hours  glucagon  Injectable 1 milliGRAM(s) IntraMuscular once  insulin glargine Injectable (LANTUS) 28 Unit(s) SubCutaneous at bedtime  insulin lispro (ADMELOG) corrective regimen sliding scale   SubCutaneous three times a day before meals  insulin lispro (ADMELOG) corrective regimen sliding scale   SubCutaneous at bedtime  insulin lispro Injectable (ADMELOG) 8 Unit(s) SubCutaneous three times a day before meals  metoprolol succinate ER 25 milliGRAM(s) Oral daily  pantoprazole    Tablet 40 milliGRAM(s) Oral before breakfast  polyethylene glycol 3350 17 Gram(s) Oral daily  senna 2 Tablet(s) Oral at bedtime  sodium chloride 0.9% lock flush 3 milliLiter(s) IV Push every 8 hours      Physical Exam  General: Patient comfortable in bed  Vital Signs Last 12 Hrs  T(F): 97.7 (03-29-22 @ 09:05), Max: 98.3 (03-29-22 @ 05:00)  HR: 100 (03-29-22 @ 09:05) (97 - 100)  BP: 102/66 (03-29-22 @ 09:05) (102/66 - 111/70)  BP(mean): 83 (03-29-22 @ 05:00) (83 - 83)  RR: 17 (03-29-22 @ 09:05) (17 - 18)  SpO2: 96% (03-29-22 @ 09:05) (95% - 96%)

## 2022-03-29 NOTE — DIETITIAN INITIAL EVALUATION ADULT. - ORAL INTAKE PTA/DIET HISTORY
visited pt at bedside this morning. confirms NKFA. states to eat a healthy diet PTA, counts carbohydrates and aims for 1800kcal daily. eats a variety of lean protein. no vitamins/minerals noted in outpatient medications.

## 2022-03-29 NOTE — PROGRESS NOTE ADULT - ASSESSMENT
65 y/o Male with no PMH presented to [place] complaining of chest heaviness/pain since last night, associated with diaphoresis and emesis. EKG revealed new LBBB. Patient was transferred to [facility] and cardiac cath performed for NSTEMI which demonstrated severe multivessel CAD. Patient was in cardiogenic shock, and IABP was placed and Dobutamine drip was started. TTE demonstrated EF of 20-25%, RV size normal but decreased function. No LV thrombus. s/p IABP. Patient was transferred and admitted to CTU for further management. He is now s/p 3V CABG on 3/23 (LAD-LIMA, SVG-OM, SVG-PDA), approaching euvolemia and now tolerating oral GDMT.    BNP 7087, Tn 3580   Cardiac studies:   LHC 3/19/2022: Multivessel CAD  Echo 3/19/2022: EF 20-25%, no LV thrombus, minimal MR, RV normal size with decreased function.     PAC 3/20/2022 AM: CO/CI 5.7/2.5; , PA 32/15/22; CVP 5; MAP 70;   PAC 3/22/2022 AM: CO/CI 7.5/3.3; , PA 46/20/32, CVP 7, Wedge 22, PA Sat 73%, Aug , MAP 71,  on IABP 1:1,  1, Milrinone 0.4  PAC 3/24/2022 AM: CO/CI 6.7/3.3; SVR 1355, RA 18, PA 40/14/26 on IABP 1:1, milrinone 0.4, dobutamine 3, vasopressin 0.08

## 2022-03-29 NOTE — DIETITIAN INITIAL EVALUATION ADULT. - PERSON TAUGHT/METHOD
DM2 education provided - examples of carbohydrates and moderation at meals. Provided patient with Carbohydrate Counting for People with Diabetes packet/verbal instruction/written material/patient instructed/spouse instructed

## 2022-03-29 NOTE — DISCHARGE NOTE NURSING/CASE MANAGEMENT/SOCIAL WORK - NSSCNAMETXT_GEN_ALL_CORE
[facility]  Cardiology  Progress Note    Patient Name: Jenni Todd  MRN: [id number]  Admission Date: 4/12/2019  Hospital Length of Stay: 3 days  Code Status: Full Code   Attending Physician: Jewel Montenegro MD   Primary Care Physician: Michael Tan Iii, MD  Principal Problem:Septic shock    Subjective:     Interval History: Overnight no acute events.     Review of Systems   All other systems reviewed and are negative.    Objective:     Vital Signs (Most Recent):  Temp: 98.9 °F (37.2 °C) (04/14/19 1901)  Pulse: 104 (04/15/19 0309)  Resp: 16 (04/15/19 0309)  BP: 95/61 (04/15/19 0200)  SpO2: 96 % (04/15/19 0309) Vital Signs (24h Range):  Temp:  [98.9 °F (37.2 °C)] 98.9 °F (37.2 °C)  Pulse:  [104-110] 104  Resp:  [16-31] 16  SpO2:  [79 %-100 %] 96 %  BP: ()/(61-77) 95/61     Weight: 97.1 kg (214 lb)  Body mass index is 33.52 kg/m².     SpO2: 96 %  O2 Device (Oxygen Therapy): room air      Intake/Output Summary (Last 24 hours) at 4/15/2019 0315  Last data filed at 4/15/2019 0200  Gross per 24 hour   Intake 708.37 ml   Output 0 ml   Net 708.37 ml       Lines/Drains/Airways     Central Venous Catheter Line                 Hemodialysis Catheter 03/28/19 1528 left femoral 17 days          Drain                 Hemodialysis AV Fistula Left upper arm -- days         Hemodialysis AV Graft Right upper arm -- days          Peripheral Intravenous Line                 Peripheral IV - Single Lumen 04/12/19 0155 22 G Right Hand 3 days         Peripheral IV - Single Lumen 04/12/19 0300 20 G Left Forearm 3 days                Physical Exam   Constitutional: She is oriented to person, place, and time. She appears well-developed and well-nourished. No distress.   HENT:   Head: Normocephalic and atraumatic.   Mouth/Throat: Oropharynx is clear and moist.   Eyes: Pupils are equal, round, and reactive to light. EOM are normal.   Neck: Normal range of motion. Neck supple. No JVD present.   Cardiovascular:  Normal rate and regular rhythm. Exam reveals no gallop and no friction rub.   No murmur heard.  Pulmonary/Chest: Effort normal and breath sounds normal. No respiratory distress. She has no wheezes. She has no rales. She exhibits no tenderness.   Abdominal: Soft. Bowel sounds are normal. She exhibits no distension. There is no tenderness.   Musculoskeletal: Normal range of motion. She exhibits no edema.   Lymphadenopathy:     She has no cervical adenopathy.   Neurological: She is alert and oriented to person, place, and time.   Skin: Skin is warm and dry. No erythema.   Psychiatric: She has a normal mood and affect. Her behavior is normal. Judgment and thought content normal.       Significant Labs: All pertinent lab results from the last 24 hours have been reviewed.    Significant Imaging: Echocardiogram:   2D echo with color flow doppler:   Results for orders placed or performed during the hospital encounter of 11/03/16   2D echo with color flow doppler   Result Value Ref Range    QEF 60 55 - 65    Mitral Valve Regurgitation MILD     Diastolic Dysfunction No     Aortic Valve Stenosis TRIVIAL TO MILD      Assessment and Plan:     NSTEMI (non-ST elevated myocardial infarction)  -c/w ACS protocol (DAPT- ASA/Plavix and heparin drip)   -LV EF of 40%, need to initiate GDMT (beta-blocker, ACE-I or ARB)   -consult Interventional Cardiology for ischemic workup with [facility]     (Delayed documentation from 4/14/19 due to Epic down-time).     Gabriella Ho MD  Cardiology Fellow, PGY-4   [facility]   [facility]

## 2022-03-29 NOTE — DIETITIAN INITIAL EVALUATION ADULT. - OTHER INFO
Pt states to have good PO intake. Denies nausea/vomiting/constipation/diarrhea. Denies difficulty chewing / swallowing. Last bowel movement 3/27 per flow sheets. Dosing weight: 83.6kg.

## 2022-03-29 NOTE — PROGRESS NOTE ADULT - SUBJECTIVE AND OBJECTIVE BOX
Subjective ' Hello I feel better today"    VITAL SIGNS    Telemetry:  NSR 95    Vital Signs Last 24 Hrs  T(C): 36.6 (22 @ 12:08), Max: 36.8 (22 @ 05:00)  T(F): 97.9 (22 @ 12:08), Max: 98.3 (22 @ 05:00)  HR: 92 (22 12:08) (92 - 105)  BP: 114/74 (22 @ 12:08) (102/66 - 114/74)  RR: 18 (22 @ 12:08) (17 - 18)  SpO2: 95% (22 @ 12:08) (93% - 96%)            @ 07:01  -   @ 07:00  --------------------------------------------------------  IN: 360 mL / OUT: 1930 mL / NET: -1570 mL     @ 07:01  -   @ 12:18  --------------------------------------------------------  IN: 360 mL / OUT: 200 mL / NET: 160 mL      Daily Weight in k.9 (29 Mar 2022 10:00)                        11.1   18.25 )-----------( 535      ( 29 Mar 2022 05:32 )             34.7         136  |  96  |  20  ----------------------------<  118<H>  3.8   |  28  |  0.80    Ca    9.3      29 Mar 2022 05:32  Phos  4.0       Mg     2.2         TPro  6.4  /  Alb  3.8  /  TBili  0.7  /  DBili  x   /  AST  18  /  ALT  33  /  AlkPhos  98  03-28      MEDICATIONS  (STANDING):  aspirin enteric coated 81 milliGRAM(s) Oral daily  atorvastatin 80 milliGRAM(s) Oral at bedtime  bisacodyl Suppository 10 milliGRAM(s) Rectal once  dextrose 40% Gel 15 Gram(s) Oral once  dextrose 50% Injectable 50 milliLiter(s) IV Push every 15 minutes  enoxaparin Injectable 40 milliGRAM(s) SubCutaneous every 24 hours  glucagon  Injectable 1 milliGRAM(s) IntraMuscular once  insulin glargine Injectable (LANTUS) 28 Unit(s) SubCutaneous at bedtime  insulin lispro (ADMELOG) corrective regimen sliding scale   SubCutaneous three times a day before meals  insulin lispro (ADMELOG) corrective regimen sliding scale   SubCutaneous at bedtime  insulin lispro Injectable (ADMELOG) 8 Unit(s) SubCutaneous three times a day before meals  metoprolol succinate ER 25 milliGRAM(s) Oral daily  pantoprazole    Tablet 40 milliGRAM(s) Oral before breakfast  polyethylene glycol 3350 17 Gram(s) Oral daily  sacubitril 24 mG/valsartan 26 mG 1 Tablet(s) Oral two times a day  senna 2 Tablet(s) Oral at bedtime  sodium chloride 0.9% lock flush 3 milliLiter(s) IV Push every 8 hours    MEDICATIONS  (PRN):  acetaminophen     Tablet .. 650 milliGRAM(s) Oral every 6 hours PRN Mild Pain (1 - 3)  oxyCODONE    IR 10 milliGRAM(s) Oral every 4 hours PRN Severe Pain (7 - 10)  oxyCODONE    IR 5 milliGRAM(s) Oral every 4 hours PRN Moderate Pain (4 - 6)        CAPILLARY BLOOD GLUCOSE      POCT Blood Glucose.: 205 mg/dL (29 Mar 2022 11:28)  POCT Blood Glucose.: 116 mg/dL (29 Mar 2022 07:43)  POCT Blood Glucose.: 106 mg/dL (28 Mar 2022 21:24)            Pacing Wires        [  ]   Settings:                                  Isolated  [x  ]                              PHYSICAL EXAM        Neurology: alert and oriented x 3, nonfocal, no gross deficits    CV :[id number]    Sternal Wound :  ASHA sternum  Stable    Lungs: B/l CTA on room air     Abdomen: soft, nontender, nondistended, positive bowel sounds, last bowel movement     :  Voids           Extremities:  equal strength throughout    B/lle warm well perfused + DP                                           Physical Therapy Rec:   Home  [x ]   Home w/ PT  [  ]  Rehab  [  ]    Discussed with Cardiothoracic Team at AM rounds.

## 2022-03-29 NOTE — DIETITIAN INITIAL EVALUATION ADULT. - ADD RECOMMEND
1) Will continue to monitor PO intake, weight, labs, skin, GI status and diet 2) RD to add No Sugar Added Mighty Shake supplement due to increased needs 3) Consider addition of Multivitamin and vitamin C if no contraindications to aid in incision healing

## 2022-03-29 NOTE — DIETITIAN INITIAL EVALUATION ADULT. - PERTINENT MEDS FT
MEDICATIONS  (STANDING):  aspirin enteric coated 81 milliGRAM(s) Oral daily  atorvastatin 80 milliGRAM(s) Oral at bedtime  bisacodyl Suppository 10 milliGRAM(s) Rectal once  dextrose 40% Gel 15 Gram(s) Oral once  dextrose 50% Injectable 50 milliLiter(s) IV Push every 15 minutes  enoxaparin Injectable 40 milliGRAM(s) SubCutaneous every 24 hours  glucagon  Injectable 1 milliGRAM(s) IntraMuscular once  insulin glargine Injectable (LANTUS) 34 Unit(s) SubCutaneous at bedtime  insulin lispro (ADMELOG) corrective regimen sliding scale   SubCutaneous three times a day before meals  insulin lispro (ADMELOG) corrective regimen sliding scale   SubCutaneous at bedtime  insulin lispro Injectable (ADMELOG) 11 Unit(s) SubCutaneous three times a day before meals  metoprolol succinate ER 25 milliGRAM(s) Oral daily  pantoprazole    Tablet 40 milliGRAM(s) Oral before breakfast  polyethylene glycol 3350 17 Gram(s) Oral daily  senna 2 Tablet(s) Oral at bedtime  sodium chloride 0.9% lock flush 3 milliLiter(s) IV Push every 8 hours    MEDICATIONS  (PRN):  acetaminophen     Tablet .. 650 milliGRAM(s) Oral every 6 hours PRN Mild Pain (1 - 3)  oxyCODONE    IR 10 milliGRAM(s) Oral every 4 hours PRN Severe Pain (7 - 10)  oxyCODONE    IR 5 milliGRAM(s) Oral every 4 hours PRN Moderate Pain (4 - 6)

## 2022-03-29 NOTE — PROGRESS NOTE ADULT - ATTENDING COMMENTS
66/M presented with chest pain , LHC showed 3V CAD and in cardiogenic shcok, on IABP, now S/P CABG x3, off inotropes.   Appears euvolemic, agree with torsemide 20mg po daily  BP stable off dobutamine  cont Metoprolol xl 25mg po daily  cont Holmes  Will add Entresto 24-26mg po BID  repeat TTE 3/29: EF 30%, The septum, inferior, and the apex are akinetic. LVEDD 3.7cms, minimal MR.   start SGLT2i as outpt    DM, HBa1c of 11.4  Management per ICU  Endocrine following    Psychiatry eval for antidepressants     Will schedule follow up with HF clinic   discussed in detail with Patient and his wife.

## 2022-03-29 NOTE — PROGRESS NOTE ADULT - PROBLEM SELECTOR PLAN 1
Will continue current insulin regimen for now. Will continue monitoring FS and FU.  Patient reports knowing how to do insulin injections.. Suggest to reinforce teaching, DC on current basal bolus insulin regimen with Endo FU 2 weeks.  Counseled for compliance with consistent low carb diet and exercise as tolerated outpatient.  S/P TIERRA eval for additional recommendations. Will decrease Lantus to 28u at bedtime.  Will decrease Admelog to 8u before each meal and continue Admelog correction scale coverage. Will continue monitoring FS and FU.  Patient reports knowing how to do insulin injections.. Suggest to reinforce teachingMOISE on the following DM regimen:  -Lantus 28u at bedtime  -Metformin 1000mg BID  -Jardiance 10mg daily  -Endo FU 2 weeks  Counseled for compliance with consistent low carb diet and exercise as tolerated outpatient.  S/P TIERRA eval for additional recommendations.

## 2022-03-29 NOTE — DISCHARGE NOTE NURSING/CASE MANAGEMENT/SOCIAL WORK - NSDCPEFALRISK_GEN_ALL_CORE
For information on Fall & Injury Prevention, visit: [email or web address] OR  [email or web address] OR  https://www.cdc.gov/steadi/patient.html

## 2022-03-29 NOTE — DIETITIAN INITIAL EVALUATION ADULT. - PERTINENT LABORATORY DATA
03-29 Na 136 mmol/L Glu 118 mg/dL<H> K+ 3.8 mmol/L Cr  0.80 mg/dL BUN 20 mg/dL Phos n/a   Alb n/a   PAB n/a   Hgb 11.1 g/dL<L> Hct 34.7 %<L>  A1C with Estimated Average Glucose Result: 11.4 % (03-19-22 @ 19:39)  POCT Blood Glucose.: 116 mg/dL (03-29-22 @ 07:43)  POCT Blood Glucose.: 106 mg/dL (03-28-22 @ 21:24)  POCT Blood Glucose.: 172 mg/dL (03-28-22 @ 11:30)

## 2022-03-29 NOTE — PROGRESS NOTE ADULT - ASSESSMENT
Assessment  DMT2: 66y Male with newly dx DM T2 with hyperglycemia, A1C 11.4%, was not taking any hypoglycemic agents, now on basal bolus insulin, blood sugars improved and trending within overall acceptable range, no hypoglycemic episodes, eating meals with good appetite, s/p RD eval, no acute events.  CAD: s/p CABG, on medications, no chest pain, stable, monitored.  Cardiogenic shock: On meds, improving, monitored..  HLD: On statin.      Shoaib Montaño MD  Cell: [phone]  Office: [phone]       Assessment  DMT2: 66y Male with newly dx DM T2 with hyperglycemia, A1C 11.4%, was not taking any hypoglycemic agents, now on basal bolus insulin, blood sugars trending within overall acceptable range, no hypoglycemic episodes, eating meals with good appetite, s/p RD eval, no acute events.  CAD: s/p CABG, on medications, no chest pain, stable, monitored.  Cardiogenic shock: On meds, improving, monitored..  HLD: On statin.      Shoaib Montaño MD  Cell: [phone]  Office: [phone]       Assessment  DMT2: 66y Male with newly dx DM T2 with hyperglycemia, A1C 11.4%, was not taking any hypoglycemic agents, now  on basal bolus insulin, blood sugars trending within overall acceptable range, no hypoglycemic episodes, eating meals with good appetite, s/p RD eval, no acute events.  CAD: s/p CABG, on medications, no chest pain, stable, monitored.  Cardiogenic shock: On meds, improving, monitored..  HLD: On statin.      Shoaib Montaño MD  Cell: [phone]  Office: [phone]

## 2022-03-29 NOTE — DISCHARGE NOTE NURSING/CASE MANAGEMENT/SOCIAL WORK - PATIENT PORTAL LINK FT
You can access the FollowMyHealth Patient Portal offered by [facility] by registering at the following website: [email or web address]. By joining [employer]’s FollowMyHealth portal, you will also be able to view your health information using other applications (apps) compatible with our system.

## 2022-03-29 NOTE — DISCHARGE NOTE NURSING/CASE MANAGEMENT/SOCIAL WORK - NSDCPEPT PROEDHF_GEN_ALL_CORE
Call primary care provider for follow up after discharge/Activities as tolerated/Low salt diet/Monitor weight daily/Report signs and symptoms to primary care provider standing/toileting/walking

## 2022-03-29 NOTE — PROGRESS NOTE ADULT - PROBLEM SELECTOR PLAN 3
- In setting of ischemic cardiomyopathy LVEF 35% LVIDd 5.1 cm  - off  as of 3/28  - switch Aldactone to 25mg daily  - con't Toprol XL 25mg  - switch torsemide to 20mg daily  - start low dose Entresto  - start Farxiga tomorrow

## 2022-03-29 NOTE — PROGRESS NOTE ADULT - ASSESSMENT
67 y/o Male with no PMH presented to [place] complaining of chest heaviness/pain since last night, associated with diaphoresis and emesis. Patient took 324 ASA this morning. EKG revealed new LBBB. Patient was transferred to [facility], Brilinta loaded and cardiac cath performed for NSTEMI which demonstrated severe multivessel CAD. Patient was in cardiogenic shock, and IABP placed,  Dobutamine drip was started. TTE demonstrated EF of 20-25%, RV size normal but decreased function. No LV thrombus.  3/23   c3L   no significant history,   EF   25 percent , post op ,  elevated creatinine  3/25  IABP out  3/27    off  3/28     trf to 2 KEO mckeon,     Toprol   asa   ator,   endo following for a1c 13,   HF   following for EF 25 percent   3/29 VSS pacing wires removed ECHO today decrease diuretic to once daily- Toresmide 20mg qd and aldactone 25mg qd Entresto  initiated per HF low dose - sacubitril 24 mG/valsartan 26 mG 1 disposition to home in the am

## 2022-03-29 NOTE — PROGRESS NOTE ADULT - PROBLEM SELECTOR PLAN 1
sp  CABG  +PW - d/c 3/29    3/29 Entresto 24/26  per HF    BB   asa   and ator  ambulate tid   shower daily   disposition to home Wednesday sp  CABG  +PW - d/c 3/29    3/29 Entresto 24/26  per HF   toprol 25 qd   asa 81  and atorvasatin 80  torsemide 25 qd  and aldactone 20 qd  ambulate tid   shower daily   disposition to home Wednesday

## 2022-03-30 ENCOUNTER — TRANSCRIPTION ENCOUNTER (OUTPATIENT)
Age: 67
End: 2022-03-30

## 2022-03-30 VITALS
WEIGHT: 208.34 LBS | SYSTOLIC BLOOD PRESSURE: 104 MMHG | OXYGEN SATURATION: 95 % | DIASTOLIC BLOOD PRESSURE: 73 MMHG | TEMPERATURE: 99 F | HEART RATE: 85 BPM | RESPIRATION RATE: 18 BRPM

## 2022-03-30 LAB
ALBUMIN SERPL ELPH-MCNC: 4 G/DL — SIGNIFICANT CHANGE UP (ref 3.3–5)
ALP SERPL-CCNC: 97 U/L — SIGNIFICANT CHANGE UP (ref 40–120)
ALT FLD-CCNC: 23 U/L — SIGNIFICANT CHANGE UP (ref 10–45)
ANION GAP SERPL CALC-SCNC: 16 MMOL/L — SIGNIFICANT CHANGE UP (ref 5–17)
AST SERPL-CCNC: 19 U/L — SIGNIFICANT CHANGE UP (ref 10–40)
BILIRUB SERPL-MCNC: 0.8 MG/DL — SIGNIFICANT CHANGE UP (ref 0.2–1.2)
BUN SERPL-MCNC: 18 MG/DL — SIGNIFICANT CHANGE UP (ref 7–23)
CALCIUM SERPL-MCNC: 9.7 MG/DL — SIGNIFICANT CHANGE UP (ref 8.4–10.5)
CHLORIDE SERPL-SCNC: 95 MMOL/L — LOW (ref 96–108)
CO2 SERPL-SCNC: 25 MMOL/L — SIGNIFICANT CHANGE UP (ref 22–31)
CREAT SERPL-MCNC: 0.71 MG/DL — SIGNIFICANT CHANGE UP (ref 0.5–1.3)
EGFR: 101 ML/MIN/1.73M2 — SIGNIFICANT CHANGE UP
GLUCOSE BLDC GLUCOMTR-MCNC: 159 MG/DL — HIGH (ref 70–99)
GLUCOSE BLDC GLUCOMTR-MCNC: 167 MG/DL — HIGH (ref 70–99)
GLUCOSE SERPL-MCNC: 131 MG/DL — HIGH (ref 70–99)
HCT VFR BLD CALC: 38.6 % — LOW (ref 39–50)
HGB BLD-MCNC: 12.2 G/DL — LOW (ref 13–17)
MAGNESIUM SERPL-MCNC: 2.2 MG/DL — SIGNIFICANT CHANGE UP (ref 1.6–2.6)
MCHC RBC-ENTMCNC: 28.7 PG — SIGNIFICANT CHANGE UP (ref 27–34)
MCHC RBC-ENTMCNC: 31.6 GM/DL — LOW (ref 32–36)
MCV RBC AUTO: 90.8 FL — SIGNIFICANT CHANGE UP (ref 80–100)
NRBC # BLD: 0 /100 WBCS — SIGNIFICANT CHANGE UP (ref 0–0)
PHOSPHATE SERPL-MCNC: 3.6 MG/DL — SIGNIFICANT CHANGE UP (ref 2.5–4.5)
PLATELET # BLD AUTO: 569 K/UL — HIGH (ref 150–400)
POTASSIUM SERPL-MCNC: 3.8 MMOL/L — SIGNIFICANT CHANGE UP (ref 3.5–5.3)
POTASSIUM SERPL-SCNC: 3.8 MMOL/L — SIGNIFICANT CHANGE UP (ref 3.5–5.3)
PROT SERPL-MCNC: 7.1 G/DL — SIGNIFICANT CHANGE UP (ref 6–8.3)
RBC # BLD: 4.25 M/UL — SIGNIFICANT CHANGE UP (ref 4.2–5.8)
RBC # FLD: 13.6 % — SIGNIFICANT CHANGE UP (ref 10.3–14.5)
SODIUM SERPL-SCNC: 136 MMOL/L — SIGNIFICANT CHANGE UP (ref 135–145)
WBC # BLD: 20.11 K/UL — HIGH (ref 3.8–10.5)
WBC # FLD AUTO: 20.11 K/UL — HIGH (ref 3.8–10.5)

## 2022-03-30 PROCEDURE — 87040 BLOOD CULTURE FOR BACTERIA: CPT

## 2022-03-30 PROCEDURE — U0003: CPT

## 2022-03-30 PROCEDURE — C8924: CPT

## 2022-03-30 PROCEDURE — 80053 COMPREHEN METABOLIC PANEL: CPT

## 2022-03-30 PROCEDURE — 86900 BLOOD TYPING SEROLOGIC ABO: CPT

## 2022-03-30 PROCEDURE — 97162 PT EVAL MOD COMPLEX 30 MIN: CPT

## 2022-03-30 PROCEDURE — 84484 ASSAY OF TROPONIN QUANT: CPT

## 2022-03-30 PROCEDURE — 99239 HOSP IP/OBS DSCHRG MGMT >30: CPT | Mod: 24

## 2022-03-30 PROCEDURE — 86923 COMPATIBILITY TEST ELECTRIC: CPT

## 2022-03-30 PROCEDURE — C1894: CPT

## 2022-03-30 PROCEDURE — 93306 TTE W/DOPPLER COMPLETE: CPT

## 2022-03-30 PROCEDURE — 85014 HEMATOCRIT: CPT

## 2022-03-30 PROCEDURE — 85379 FIBRIN DEGRADATION QUANT: CPT

## 2022-03-30 PROCEDURE — 86901 BLOOD TYPING SEROLOGIC RH(D): CPT

## 2022-03-30 PROCEDURE — 82435 ASSAY OF BLOOD CHLORIDE: CPT

## 2022-03-30 PROCEDURE — 97116 GAIT TRAINING THERAPY: CPT

## 2022-03-30 PROCEDURE — G0103: CPT

## 2022-03-30 PROCEDURE — 82553 CREATINE MB FRACTION: CPT

## 2022-03-30 PROCEDURE — 83036 HEMOGLOBIN GLYCOSYLATED A1C: CPT

## 2022-03-30 PROCEDURE — 93460 R&L HRT ART/VENTRICLE ANGIO: CPT

## 2022-03-30 PROCEDURE — 80048 BASIC METABOLIC PNL TOTAL CA: CPT

## 2022-03-30 PROCEDURE — 96375 TX/PRO/DX INJ NEW DRUG ADDON: CPT

## 2022-03-30 PROCEDURE — 87635 SARS-COV-2 COVID-19 AMP PRB: CPT

## 2022-03-30 PROCEDURE — 84443 ASSAY THYROID STIM HORMONE: CPT

## 2022-03-30 PROCEDURE — 84100 ASSAY OF PHOSPHORUS: CPT

## 2022-03-30 PROCEDURE — 99285 EMERGENCY DEPT VISIT HI MDM: CPT | Mod: 25

## 2022-03-30 PROCEDURE — P9047: CPT

## 2022-03-30 PROCEDURE — 82565 ASSAY OF CREATININE: CPT

## 2022-03-30 PROCEDURE — C8929: CPT

## 2022-03-30 PROCEDURE — P9016: CPT

## 2022-03-30 PROCEDURE — C1729: CPT

## 2022-03-30 PROCEDURE — 85018 HEMOGLOBIN: CPT

## 2022-03-30 PROCEDURE — 99153 MOD SED SAME PHYS/QHP EA: CPT

## 2022-03-30 PROCEDURE — 83605 ASSAY OF LACTIC ACID: CPT

## 2022-03-30 PROCEDURE — P9045: CPT

## 2022-03-30 PROCEDURE — 99152 MOD SED SAME PHYS/QHP 5/>YRS: CPT

## 2022-03-30 PROCEDURE — 83880 ASSAY OF NATRIURETIC PEPTIDE: CPT

## 2022-03-30 PROCEDURE — 99291 CRITICAL CARE FIRST HOUR: CPT

## 2022-03-30 PROCEDURE — 85027 COMPLETE CBC AUTOMATED: CPT

## 2022-03-30 PROCEDURE — 93321 DOPPLER ECHO F-UP/LMTD STD: CPT

## 2022-03-30 PROCEDURE — 83615 LACTATE (LD) (LDH) ENZYME: CPT

## 2022-03-30 PROCEDURE — 81001 URINALYSIS AUTO W/SCOPE: CPT

## 2022-03-30 PROCEDURE — 81003 URINALYSIS AUTO W/O SCOPE: CPT

## 2022-03-30 PROCEDURE — 86891 AUTOLOGOUS BLOOD OP SALVAGE: CPT

## 2022-03-30 PROCEDURE — 87641 MR-STAPH DNA AMP PROBE: CPT

## 2022-03-30 PROCEDURE — 82947 ASSAY GLUCOSE BLOOD QUANT: CPT

## 2022-03-30 PROCEDURE — C1887: CPT

## 2022-03-30 PROCEDURE — 85730 THROMBOPLASTIN TIME PARTIAL: CPT

## 2022-03-30 PROCEDURE — 94010 BREATHING CAPACITY TEST: CPT

## 2022-03-30 PROCEDURE — 82962 GLUCOSE BLOOD TEST: CPT

## 2022-03-30 PROCEDURE — 85049 AUTOMATED PLATELET COUNT: CPT

## 2022-03-30 PROCEDURE — 82550 ASSAY OF CK (CPK): CPT

## 2022-03-30 PROCEDURE — C1769: CPT

## 2022-03-30 PROCEDURE — 82330 ASSAY OF CALCIUM: CPT

## 2022-03-30 PROCEDURE — 93882 EXTRACRANIAL UNI/LTD STUDY: CPT

## 2022-03-30 PROCEDURE — 85576 BLOOD PLATELET AGGREGATION: CPT

## 2022-03-30 PROCEDURE — U0005: CPT

## 2022-03-30 PROCEDURE — 83735 ASSAY OF MAGNESIUM: CPT

## 2022-03-30 PROCEDURE — 84145 PROCALCITONIN (PCT): CPT

## 2022-03-30 PROCEDURE — 85384 FIBRINOGEN ACTIVITY: CPT

## 2022-03-30 PROCEDURE — P9059: CPT

## 2022-03-30 PROCEDURE — 86850 RBC ANTIBODY SCREEN: CPT

## 2022-03-30 PROCEDURE — C1889: CPT

## 2022-03-30 PROCEDURE — 85610 PROTHROMBIN TIME: CPT

## 2022-03-30 PROCEDURE — 96374 THER/PROPH/DIAG INJ IV PUSH: CPT

## 2022-03-30 PROCEDURE — 82803 BLOOD GASES ANY COMBINATION: CPT

## 2022-03-30 PROCEDURE — 93005 ELECTROCARDIOGRAM TRACING: CPT | Mod: 76

## 2022-03-30 PROCEDURE — 84295 ASSAY OF SERUM SODIUM: CPT

## 2022-03-30 PROCEDURE — 87640 STAPH A DNA AMP PROBE: CPT

## 2022-03-30 PROCEDURE — 85025 COMPLETE CBC W/AUTO DIFF WBC: CPT

## 2022-03-30 PROCEDURE — 71045 X-RAY EXAM CHEST 1 VIEW: CPT

## 2022-03-30 PROCEDURE — 83010 ASSAY OF HAPTOGLOBIN QUANT: CPT

## 2022-03-30 PROCEDURE — 94002 VENT MGMT INPAT INIT DAY: CPT

## 2022-03-30 PROCEDURE — 84132 ASSAY OF SERUM POTASSIUM: CPT

## 2022-03-30 PROCEDURE — 86803 HEPATITIS C AB TEST: CPT

## 2022-03-30 PROCEDURE — 85396 CLOTTING ASSAY WHOLE BLOOD: CPT

## 2022-03-30 PROCEDURE — 36430 TRANSFUSION BLD/BLD COMPNT: CPT

## 2022-03-30 PROCEDURE — 36415 COLL VENOUS BLD VENIPUNCTURE: CPT

## 2022-03-30 RX ORDER — POLYETHYLENE GLYCOL 3350 17 G/17G
17 POWDER, FOR SOLUTION ORAL
Qty: 1 | Refills: 0
Start: 2022-03-30 | End: 2022-04-28

## 2022-03-30 RX ORDER — ASPIRIN/CALCIUM CARB/MAGNESIUM 324 MG
1 TABLET ORAL
Qty: 0 | Refills: 0 | DISCHARGE

## 2022-03-30 RX ORDER — METFORMIN HYDROCHLORIDE 850 MG/1
1 TABLET ORAL
Qty: 60 | Refills: 0
Start: 2022-03-30 | End: 2022-04-28

## 2022-03-30 RX ORDER — ASPIRIN/CALCIUM CARB/MAGNESIUM 324 MG
1 TABLET ORAL
Qty: 30 | Refills: 0
Start: 2022-03-30 | End: 2022-04-28

## 2022-03-30 RX ORDER — POTASSIUM CHLORIDE 20 MEQ
20 PACKET (EA) ORAL ONCE
Refills: 0 | Status: COMPLETED | OUTPATIENT
Start: 2022-03-30 | End: 2022-03-30

## 2022-03-30 RX ORDER — ENOXAPARIN SODIUM 100 MG/ML
28 INJECTION SUBCUTANEOUS
Qty: 3 | Refills: 0
Start: 2022-03-30 | End: 2022-04-28

## 2022-03-30 RX ORDER — OXYCODONE HYDROCHLORIDE 5 MG/1
1 TABLET ORAL
Qty: 24 | Refills: 0
Start: 2022-03-30 | End: 2022-04-02

## 2022-03-30 RX ORDER — ACETAMINOPHEN 500 MG
2 TABLET ORAL
Qty: 240 | Refills: 0
Start: 2022-03-30 | End: 2022-04-28

## 2022-03-30 RX ORDER — PANTOPRAZOLE SODIUM 20 MG/1
1 TABLET, DELAYED RELEASE ORAL
Qty: 30 | Refills: 0
Start: 2022-03-30 | End: 2022-04-28

## 2022-03-30 RX ORDER — METOPROLOL TARTRATE 50 MG
1 TABLET ORAL
Qty: 30 | Refills: 0
Start: 2022-03-30 | End: 2022-04-28

## 2022-03-30 RX ORDER — SACUBITRIL AND VALSARTAN 24; 26 MG/1; MG/1
1 TABLET, FILM COATED ORAL
Qty: 60 | Refills: 0
Start: 2022-03-30 | End: 2022-04-28

## 2022-03-30 RX ORDER — ATORVASTATIN CALCIUM 80 MG/1
1 TABLET, FILM COATED ORAL
Qty: 30 | Refills: 0
Start: 2022-03-30 | End: 2022-04-28

## 2022-03-30 RX ORDER — ISOPROPYL ALCOHOL, BENZOCAINE .7; .06 ML/ML; ML/ML
1 SWAB TOPICAL
Qty: 50 | Refills: 1
Start: 2022-03-30 | End: 2022-05-18

## 2022-03-30 RX ORDER — SPIRONOLACTONE 25 MG/1
1 TABLET, FILM COATED ORAL
Qty: 10 | Refills: 0
Start: 2022-03-30 | End: 2022-04-08

## 2022-03-30 RX ORDER — EMPAGLIFLOZIN 10 MG/1
1 TABLET, FILM COATED ORAL
Qty: 30 | Refills: 0
Start: 2022-03-30 | End: 2022-04-28

## 2022-03-30 RX ADMIN — Medication 650 MILLIGRAM(S): at 00:10

## 2022-03-30 RX ADMIN — Medication 8 UNIT(S): at 11:26

## 2022-03-30 RX ADMIN — SACUBITRIL AND VALSARTAN 1 TABLET(S): 24; 26 TABLET, FILM COATED ORAL at 05:23

## 2022-03-30 RX ADMIN — PANTOPRAZOLE SODIUM 40 MILLIGRAM(S): 20 TABLET, DELAYED RELEASE ORAL at 05:23

## 2022-03-30 RX ADMIN — Medication 8 UNIT(S): at 07:47

## 2022-03-30 RX ADMIN — ENOXAPARIN SODIUM 40 MILLIGRAM(S): 100 INJECTION SUBCUTANEOUS at 05:30

## 2022-03-30 RX ADMIN — Medication 25 MILLIGRAM(S): at 05:22

## 2022-03-30 RX ADMIN — Medication 20 MILLIEQUIVALENT(S): at 09:17

## 2022-03-30 RX ADMIN — Medication 81 MILLIGRAM(S): at 11:26

## 2022-03-30 RX ADMIN — Medication 20 MILLIGRAM(S): at 05:22

## 2022-03-30 RX ADMIN — Medication 1: at 07:46

## 2022-03-30 RX ADMIN — SPIRONOLACTONE 25 MILLIGRAM(S): 25 TABLET, FILM COATED ORAL at 05:22

## 2022-03-30 RX ADMIN — SODIUM CHLORIDE 3 MILLILITER(S): 9 INJECTION INTRAMUSCULAR; INTRAVENOUS; SUBCUTANEOUS at 06:09

## 2022-03-30 RX ADMIN — OXYCODONE HYDROCHLORIDE 10 MILLIGRAM(S): 5 TABLET ORAL at 07:46

## 2022-03-30 RX ADMIN — OXYCODONE HYDROCHLORIDE 10 MILLIGRAM(S): 5 TABLET ORAL at 08:18

## 2022-03-30 RX ADMIN — Medication 650 MILLIGRAM(S): at 01:10

## 2022-03-30 RX ADMIN — Medication 1: at 11:25

## 2022-03-30 RX ADMIN — POLYETHYLENE GLYCOL 3350 17 GRAM(S): 17 POWDER, FOR SOLUTION ORAL at 11:26

## 2022-03-30 NOTE — DISCHARGE NOTE PROVIDER - HOSPITAL COURSE
This is a  65 y/o Male with no PMH presented to [place] complaining of chest heaviness/pain since last night, associated with diaphoresis and emesis. Patient took 324 ASA this morning. EKG revealed new LBBB. Patient was transferred to [facility], Brilinta loaded and cardiac cath performed for NSTEMI which demonstrated severe multivessel CAD. Patient was in cardiogenic shock, and IABP placed,  Dobutamine drip was started. TTE demonstrated EF of 20-25%, RV size normal but decreased function. No LV thrombus.  3/23   c3L   no significant history,   EF   25 percent , post op ,  elevated creatinine  3/25  IABP out  3/27    off  3/28     trf to 2 LYNNE mckeonR,     Toprol   asa   ator,   endo following for a1c 13,   HF   following for EF 25 percent   3/29 VSS pacing wires removed ECHO today decrease diuretic to once daily- Toresmide 20mg qd and aldactone 25mg qd Entresto  initiated per HF low dose - sacubitril 24 mG/valsartan 26 mG 1 disposition to home in the am  3/30 VSS seen and  examined in company of Dr Jenniffer dougherty and eager for discharge

## 2022-03-30 NOTE — PROGRESS NOTE ADULT - PROBLEM SELECTOR PLAN 4
Will continue statin, primary team FU
Newly diagnosed A1c 11.4  Eventually will need endocrine c/s for optimization  - Farxiga as above
Will continue statin, primary team FU
Will continue statin, primary team FU
Newly diagnosed A1c 11.4  Eventually will need endocrine c/s for optimization  - SGLT2i prior to DC.
Will continue statin, primary team FU
Will continue statin, primary team FU
Newly diagnosed A1c 11.4  Eventually will need endocrine c/s for optimization  - SGLT2i prior to DC.
Newly diagnosed A1c 11.4  Eventually will need endocrine c/s for optimization  - SGLT2i prior to DC
Newly diagnosed A1c 11.4  Eventually will need endocrine c/s for optimization  - SGLT2i prior to DC.

## 2022-03-30 NOTE — DISCHARGE NOTE PROVIDER - CARE PROVIDER_API CALL
Ronny Abad)  Surgery; Thoracic and Cardiac Surgery  [address] [place] [address]  Phone: [phone]  Fax: [phone]  Scheduled Appointment: 04/08/2022 12:45 PM

## 2022-03-30 NOTE — DISCHARGE NOTE PROVIDER - NSDCCPCAREPLAN_GEN_ALL_CORE_FT
PRINCIPAL DISCHARGE DIAGNOSIS  Diagnosis: S/P CABG x 3  Assessment and Plan of Treatment: 1. Daily Shower  2. Weight yourself daily and notify any weight gain greater than 2-3 pounds in 24 hours.  3. Regular diet - low fat, low cholesterol, no added salt.  4. Cleanse Midsternal incision and leg incision daily while showering with warm water and mild soap, pat dry and maintain open to air.   5. Follow Cardiac Surgery Do's and Don'ts discharge instructions.   6. No driving until cleared by MD.   7. No heavy lifting nothing greater than 5 pounds until cleared by MD.   8. Call / Notify MD any fever greater than 101.0  9. Increase Activity as tolerated..  10.Follow up with your  cardiologist within 7-10 days of discharge   11.Follow up with Dr Abad on April 8 at 12:45 at [address] [address]  12. Call our office for fever chills or any concerns [phone]  13 Follow up with heart failure appointment made  Have a Stat COMP CBC on april 5 fax results to    CELSO Owens      SECONDARY DISCHARGE DIAGNOSES  Diagnosis: Diabetes  Assessment and Plan of Treatment: Check your glucose kenroy and pm  bring [facility] chambers with you to all appointments  Follow  up Reginaldo Montaño within 2 weeks call for appointment  [phone] [facility]  [address] [phone]  You must eat regualr meals while taking metformin /lantus

## 2022-03-30 NOTE — PROGRESS NOTE ADULT - PROBLEM SELECTOR PROBLEM 3
Heart failure with reduced ejection fraction
Cardiogenic shock
Cardiogenic shock
Heart failure with reduced ejection fraction
Cardiogenic shock
Cardiogenic shock
Heart failure with reduced ejection fraction
Cardiogenic shock

## 2022-03-30 NOTE — DISCHARGE NOTE PROVIDER - NSDCMRMEDTOKEN_GEN_ALL_CORE_FT
acetaminophen 325 mg oral tablet: 2 tab(s) orally every 6 hours not greater the 3000 mg in one day  alcohol swabs : Apply topically to affected area 2 times a day   alcohol swabs : Apply topically to affected area 2 times a day   aspirin 81 mg oral delayed release tablet: 1 tab(s) orally once a day   atorvastatin 80 mg oral tablet: 1 tab(s) orally once a day (at bedtime)  Entresto 24 mg-26 mg oral tablet: 1 tab(s) orally 2 times a day   glucometer (per patient&#x27;s insurance): 1 application subcutaneous 2 times a day   Insulin Pen Needles, 4mm: 1 application subcutaneously once a day . ** Use with insulin pen **   Jardiance 10 mg oral tablet: 1 tab(s) orally once a day   lancets: 1 application subcutaneously 2 times a day   Lantus Solostar Pen 100 units/mL subcutaneous solution: 28 unit(s) subcutaneous once a day (at bedtime)   metFORMIN 1000 mg oral tablet: 1 tab(s) orally 2 times a day   metoprolol succinate 25 mg oral tablet, extended release: 1 tab(s) orally once a day  oxyCODONE 5 mg oral tablet: 1 tab(s) orally every 4 hours, As needed, Moderate Pain (4 - 6) MDD:4  pantoprazole 40 mg oral delayed release tablet: 1 tab(s) orally once a day (before a meal)  polyethylene glycol 3350 oral powder for reconstitution: 17 gram(s) orally once a day  spironolactone 25 mg oral tablet: 1 tab(s) orally once a day  torsemide 20 mg oral tablet: 1 tab(s) orally once a day

## 2022-03-30 NOTE — PROGRESS NOTE ADULT - PROBLEM SELECTOR PROBLEM 4
HLD (hyperlipidemia)
HLD (hyperlipidemia)
DM (diabetes mellitus)
DM (diabetes mellitus)
HLD (hyperlipidemia)
HLD (hyperlipidemia)
DM (diabetes mellitus)
HLD (hyperlipidemia)
DM (diabetes mellitus)
DM (diabetes mellitus)

## 2022-03-30 NOTE — PROGRESS NOTE ADULT - REASON FOR ADMISSION
Chest pressure/pain
sp   c3l   preop IABP
Chest pressure/pain

## 2022-03-30 NOTE — PROGRESS NOTE ADULT - ASSESSMENT
Assessment  DMT2: 66y Male with newly dx DM T2 with hyperglycemia, A1C 11.4%, was not taking any hypoglycemic agents, now on basal bolus insulin, decreased dose yesterday, blood sugars fluctuating within overall acceptable range, no hypoglycemic episodes, eating meals, no acute events, planning DC home.  CAD: s/p CABG, on medications, no chest pain, stable, monitored.  Cardiogenic shock: On meds, improving, monitored..  HLD: On statin.      Shoaib Montaño MD  Cell: [phone]  Office: [phone]       Assessment  DMT2: 66y Male with newly dx DM T2 with hyperglycemia, A1C 11.4%, was not taking any hypoglycemic agents, now on basal bolus insulin, decreased dose yesterday, blood sugars fluctuating  within overall acceptable range, no hypoglycemic episodes, eating meals, no acute events, planning DC home.  CAD: s/p CABG, on medications, no chest pain, stable, monitored.  Cardiogenic shock: On meds, improving, monitored..  HLD: On statin.      Shoaib Montaño MD  Cell: [phone]  Office: [phone]

## 2022-03-30 NOTE — DISCHARGE NOTE PROVIDER - NSDCFUSCHEDAPPT_GEN_ALL_CORE_FT
NATALIA TENA ; 04/08/2022 ; NPP CT Surg [address] NATALIA Mosher ; 04/12/2022 ; NP [facility]

## 2022-03-30 NOTE — PROGRESS NOTE ADULT - SUBJECTIVE AND OBJECTIVE BOX
Chief complaint  Patient is a 66y old  Male who presents with a chief complaint of s/p CABG 3L (30 Mar 2022 09:27)   Review of systems  No acute events, no hypoglycemic episodes. Planning DC home.    Labs and Fingersticks  CAPILLARY BLOOD GLUCOSE      POCT Blood Glucose.: 167 mg/dL (30 Mar 2022 11:24)  POCT Blood Glucose.: 159 mg/dL (30 Mar 2022 07:31)  POCT Blood Glucose.: 152 mg/dL (29 Mar 2022 21:38)  POCT Blood Glucose.: 79 mg/dL (29 Mar 2022 16:35)      Anion Gap, Serum: 16 (03-30 @ 06:33)  Anion Gap, Serum: 12 (03-29 @ 05:32)      Calcium, Total Serum: 9.7 (03-30 @ 06:33)  Calcium, Total Serum: 9.3 (03-29 @ 05:32)  Albumin, Serum: 4.0 (03-30 @ 06:33)    Alanine Aminotransferase (ALT/SGPT): 23 (03-30 @ 06:33)  Alkaline Phosphatase, Serum: 97 (03-30 @ 06:33)  Aspartate Aminotransferase (AST/SGOT): 19 (03-30 @ 06:33)        03-30    136  |  95<L>  |  18  ----------------------------<  131<H>  3.8   |  25  |  0.71    Ca    9.7      30 Mar 2022 06:33  Phos  3.6     03-30  Mg     2.2     03-30    TPro  7.1  /  Alb  4.0  /  TBili  0.8  /  DBili  x   /  AST  19  /  ALT  23  /  AlkPhos  97  03-30                        12.2   20.11 )-----------( 569      ( 30 Mar 2022 06:33 )             38.6     Medications  MEDICATIONS  (STANDING):  aspirin enteric coated 81 milliGRAM(s) Oral daily  atorvastatin 80 milliGRAM(s) Oral at bedtime  bisacodyl Suppository 10 milliGRAM(s) Rectal once  dextrose 40% Gel 15 Gram(s) Oral once  dextrose 50% Injectable 50 milliLiter(s) IV Push every 15 minutes  enoxaparin Injectable 40 milliGRAM(s) SubCutaneous every 24 hours  glucagon  Injectable 1 milliGRAM(s) IntraMuscular once  insulin glargine Injectable (LANTUS) 28 Unit(s) SubCutaneous at bedtime  insulin lispro (ADMELOG) corrective regimen sliding scale   SubCutaneous three times a day before meals  insulin lispro (ADMELOG) corrective regimen sliding scale   SubCutaneous at bedtime  insulin lispro Injectable (ADMELOG) 8 Unit(s) SubCutaneous three times a day before meals  metoprolol succinate ER 25 milliGRAM(s) Oral daily  pantoprazole    Tablet 40 milliGRAM(s) Oral before breakfast  polyethylene glycol 3350 17 Gram(s) Oral daily  sacubitril 24 mG/valsartan 26 mG 1 Tablet(s) Oral two times a day  senna 2 Tablet(s) Oral at bedtime  sodium chloride 0.9% lock flush 3 milliLiter(s) IV Push every 8 hours  spironolactone 25 milliGRAM(s) Oral daily  torsemide 20 milliGRAM(s) Oral daily      Physical Exam  Vital Signs Last 12 Hrs  T(F): 98.7 (03-30-22 @ 04:43), Max: 98.7 (03-30-22 @ 04:43)  HR: 85 (03-30-22 @ 04:43) (85 - 85)  BP: 104/73 (03-30-22 @ 04:43) (104/73 - 104/73)  BP(mean): --  RR: 18 (03-30-22 @ 04:43) (18 - 18)  SpO2: 95% (03-30-22 @ 04:43) (95% - 95%)     Chief complaint  Patient is a 66y old  Male who presents with a chief complaint of s/p CABG 3L (30 Mar 2022 09:27)   Review of systems  No acute events, no hypoglycemic episodes. Planning DC home.    Labs and Fingersticks  CAPILLARY BLOOD GLUCOSE      POCT Blood Glucose.: 167 mg/dL (30 Mar 2022 11:24)  POCT Blood Glucose.: 159 mg/dL (30 Mar 2022 07:31)  POCT Blood Glucose.: 152 mg/dL (29 Mar 2022 21:38)  POCT Blood Glucose.: 79 mg/dL (29 Mar 2022 16:35)      Anion Gap, Serum: 16 (03-30 @ 06:33)  Anion Gap, Serum: 12 (03-29 @ 05:32)      Calcium, Total Serum: 9.7 (03-30 @ 06:33)  Calcium, Total Serum: 9.3 (03-29 @ 05:32)  Albumin, Serum: 4.0 (03-30 @ 06:33)    Alanine Aminotransferase (ALT/SGPT): 23 (03-30 @ 06:33)  Alkaline Phosphatase, Serum: 97 (03-30 @ 06:33)  Aspartate Aminotransferase (AST/SGOT): 19 (03-30 @ 06:33)        03-30    136  |  95<L>  |  18  ----------------------------<  131<H>  3.8   |  25  |  0.71    Ca    9.7      30 Mar 2022 06:33  Phos  3.6     03-30  Mg     2.2     03-30    TPro  7.1  /  Alb  4.0  /  TBili  0.8  /  DBili  x   /  AST  19  /  ALT  23  /  AlkPhos  97  03-30                        12.2   20.11 )-----------( 569      ( 30 Mar 2022 06:33 )             38.6     Medications  MEDICATIONS  (STANDING):  aspirin enteric coated 81 milliGRAM(s) Oral daily  atorvastatin 80 milliGRAM(s) Oral at bedtime  bisacodyl Suppository 10 milliGRAM(s) Rectal once  dextrose 40% Gel 15 Gram(s) Oral once  dextrose 50% Injectable 50 milliLiter(s) IV Push every 15 minutes  enoxaparin Injectable 40 milliGRAM(s) SubCutaneous every 24 hours  glucagon  Injectable 1 milliGRAM(s) IntraMuscular once  insulin glargine Injectable (LANTUS) 28 Unit(s) SubCutaneous at bedtime  insulin lispro (ADMELOG) corrective regimen sliding scale   SubCutaneous three times a day before meals  insulin lispro (ADMELOG) corrective regimen sliding scale   SubCutaneous at bedtime  insulin lispro Injectable (ADMELOG) 8 Unit(s) SubCutaneous three times a day before meals  metoprolol succinate ER 25 milliGRAM(s) Oral daily  pantoprazole    Tablet 40 milliGRAM(s) Oral before breakfast  polyethylene glycol 3350 17 Gram(s) Oral daily  sacubitril 24 mG/valsartan 26 mG 1 Tablet(s) Oral two times a day  senna 2 Tablet(s) Oral at bedtime  sodium chloride 0.9% lock flush 3 milliLiter(s) IV Push every 8 hours  spironolactone 25 milliGRAM(s) Oral daily  torsemide 20 milliGRAM(s) Oral daily      Physical Exam  Vital Signs Last 12 Hrs  T(F): 98.7 (03-30-22 @ 04:43), Max: 98.7 (03-30-22 @ 04:43)  HR: 85 (03-30-22 @ 04:43) (85 - 85)  BP: 104/73 (03-30-22 @ 04:43) (104/73 - 104/73)  BP(mean): --  RR: 18 (03-30-22 @ 04:43) (18 - 18)  SpO2: 95% (03-30-22 @ 04:43) (95% - 95%)

## 2022-03-30 NOTE — PROGRESS NOTE ADULT - PROBLEM SELECTOR PLAN 1
Will continue current insulin regimen for now. Will continue monitoring FS and FU.  Patient reports knowing how to do insulin injections.. Suggest to reinforce teachingMOISE on the following DM regimen:  -Lantus 28u at bedtime  -Metformin 1000mg BID  -Jardiance 10mg daily  -Endo FU 2 weeks  Counseled for compliance with consistent low carb diet and exercise as tolerated outpatient.  S/P RD eval for additional recommendations.

## 2022-03-30 NOTE — DISCHARGE NOTE PROVIDER - NSDCPNSUBOBJ_GEN_ALL_CORE
Axox3  NSR [id number]   MTI ASHA prinivil tape    + staples at chest tube site  Lungs  B/l CTAon room air  Ab soft + BM   scrotal hernia  voids   equal strength throughout   B/lle warm well perfused + DP    20.1/12.2/38.6/569  136/3.8/95/25/18/0.71  104/73/85 18 95 37.1   94.5 kg  Greater then 45 minute spent on discharge

## 2022-03-31 ENCOUNTER — NON-APPOINTMENT (OUTPATIENT)
Age: 67
End: 2022-03-31

## 2022-04-01 ENCOUNTER — APPOINTMENT (OUTPATIENT)
Dept: CARE COORDINATION | Facility: HOME HEALTH | Age: 67
End: 2022-04-01
Payer: MEDICARE

## 2022-04-01 PROCEDURE — 99024 POSTOP FOLLOW-UP VISIT: CPT

## 2022-04-01 NOTE — HISTORY OF PRESENT ILLNESS
[Home] : at home, [unfilled] , at the time of the visit. [Other Location: e.g. Home (Enter Location, City,State)___] : at [unfilled] [Spouse] : spouse [Verbal consent obtained from patient] : the patient, [unfilled] [FreeTextEntry1] : This is a 67 y/o Male with no PMH whom originally presented to [place] complaining of chest heaviness/pain, associated with diaphoresis and emesis. EKG revealed new LBBB. Patient was transferred to [facility] loaded and cardiac cath performed for NSTEMI, which demonstrated severe multivessel CAD. Patient was in cardiogenic shock, and IABP placed, Dobutamine drip was started. TTE demonstrated EF of 20-25%,\par RV size normal but decreased function. On 3/23 pt underwent CABG x3. Endo following for elevated \par a1c (11.4). HF following for low EF (25 percent). Pt now seen today via telehealth feeling very well, w/o any complaints. He states prior to sx he had a lot of stress, his mom and sister both . He states he also had no hx of DM and his sugars at home since sx have been great, around 100. 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 He has no questions at this time. Emotional support and education to weigh self daily provided.\par

## 2022-04-01 NOTE — ASSESSMENT
[FreeTextEntry1] : This is a 66 M whom is recovering well at home s/p C3L. He has good support of his wife. 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 He did not want a home visit, therefore a telehealth visit was completed. Pt did not weigh self today. Pt educated to weigh self daily and notify [facility] team of any weight gain as mentioned below.

## 2022-04-01 NOTE — PHYSICAL EXAM
[Neck Appearance] : the appearance of the neck was normal [] : no respiratory distress [Exaggerated Use Of Accessory Muscles For Inspiration] : no accessory muscle use [FreeTextEntry1] : MSI, CT sites and SVG sites without erythema or drainage, with edges well approximated. CT Sites have staples in place. [Examination Of The Chest] : the chest was normal in appearance [Diminished Respiratory Excursion] : normal chest expansion [Skin Color & Pigmentation] : normal skin color and pigmentation [Oriented To Time, Place, And Person] : oriented to person, place, and time [Impaired Insight] : insight and judgment were intact [Affect] : the affect was normal [Mood] : the mood was normal

## 2022-04-02 LAB
CULTURE RESULTS: SIGNIFICANT CHANGE UP
CULTURE RESULTS: SIGNIFICANT CHANGE UP
SPECIMEN SOURCE: SIGNIFICANT CHANGE UP
SPECIMEN SOURCE: SIGNIFICANT CHANGE UP

## 2022-04-06 ENCOUNTER — TRANSCRIPTION ENCOUNTER (OUTPATIENT)
Age: 67
End: 2022-04-06

## 2022-04-06 PROBLEM — Z09 POSTOPERATIVE FOLLOW-UP: Status: RESOLVED | Noted: 2022-04-06 | Resolved: 2022-04-06

## 2022-04-07 ENCOUNTER — APPOINTMENT (OUTPATIENT)
Dept: CARDIOTHORACIC SURGERY | Facility: CLINIC | Age: 67
End: 2022-04-07
Payer: MEDICARE

## 2022-04-07 ENCOUNTER — APPOINTMENT (OUTPATIENT)
Dept: HEART AND VASCULAR | Facility: CLINIC | Age: 67
End: 2022-04-07
Payer: MEDICARE

## 2022-04-07 ENCOUNTER — NON-APPOINTMENT (OUTPATIENT)
Age: 67
End: 2022-04-07

## 2022-04-07 VITALS
OXYGEN SATURATION: 98 % | SYSTOLIC BLOOD PRESSURE: 121 MMHG | HEIGHT: 69 IN | BODY MASS INDEX: 29.18 KG/M2 | RESPIRATION RATE: 16 BRPM | HEART RATE: 117 BPM | TEMPERATURE: 98 F | WEIGHT: 197 LBS | DIASTOLIC BLOOD PRESSURE: 76 MMHG

## 2022-04-07 VITALS
TEMPERATURE: 97.1 F | DIASTOLIC BLOOD PRESSURE: 65 MMHG | SYSTOLIC BLOOD PRESSURE: 102 MMHG | BODY MASS INDEX: 29.62 KG/M2 | WEIGHT: 200 LBS | HEART RATE: 118 BPM | OXYGEN SATURATION: 100 % | HEIGHT: 69 IN

## 2022-04-07 DIAGNOSIS — Z09 ENCOUNTER FOR FOLLOW-UP EXAMINATION AFTER COMPLETED TREATMENT FOR CONDITIONS OTHER THAN MALIGNANT NEOPLASM: ICD-10-CM

## 2022-04-07 DIAGNOSIS — Z95.1 PRESENCE OF AORTOCORONARY BYPASS GRAFT: ICD-10-CM

## 2022-04-07 DIAGNOSIS — Z82.49 FAMILY HISTORY OF ISCHEMIC HEART DISEASE AND OTHER DISEASES OF THE CIRCULATORY SYSTEM: ICD-10-CM

## 2022-04-07 PROCEDURE — 99024 POSTOP FOLLOW-UP VISIT: CPT

## 2022-04-07 PROCEDURE — 93000 ELECTROCARDIOGRAM COMPLETE: CPT

## 2022-04-07 PROCEDURE — 99205 OFFICE O/P NEW HI 60 MIN: CPT | Mod: 25

## 2022-04-07 NOTE — PHYSICAL EXAM
[Well Developed] : well developed [Well Nourished] : well nourished [No Acute Distress] : no acute distress [Normal Conjunctiva] : normal conjunctiva [Normal Venous Pressure] : normal venous pressure [No Carotid Bruit] : no carotid bruit [Normal S1, S2] : normal S1, S2 [No Murmur] : no murmur [No Rub] : no rub [No Gallop] : no gallop [Clear Lung Fields] : clear lung fields [Good Air Entry] : good air entry [No Respiratory Distress] : no respiratory distress  [Soft] : abdomen soft [Non Tender] : non-tender [No Masses/organomegaly] : no masses/organomegaly [Normal Bowel Sounds] : normal bowel sounds [Normal Gait] : normal gait [No Cyanosis] : no cyanosis [No Clubbing] : no clubbing [No Varicosities] : no varicosities [No Rash] : no rash [No Skin Lesions] : no skin lesions [Moves all extremities] : moves all extremities [No Focal Deficits] : no focal deficits [Normal Speech] : normal speech [Alert and Oriented] : alert and oriented [Normal memory] : normal memory [de-identified] : + median sternotomy scar well healed  [de-identified] : 2+ LE edema (l > r), s/p b/l GSVT harvest scars

## 2022-04-07 NOTE — ASSESSMENT
[FreeTextEntry1] : 1. CAD: s/p MI and cardiogenic shock (03/18/22), s/p CABG x 3 (LIMA->LAD, SVG->OM2, SVG->RPDA) (03/23/22): CCS 0\par      - continue aspirin 81mg po qd\par      - continue aggressive medical management\par \par 2. Chronic systolic heart failure: E moderately-to-severely reduced\par     - retrial of  Entresto 24/26mg po bid (will observe for cough) \par     - continue Toprol XL 25mg po qd (will not up titrate at this time given relative hypotension)\par     - continue Jardiance 10mg po qd\par     - continue spironolactone 25mg po qd (will not up titrate at this time given relative hypotension)\par     - continue torsemide 20mg po qd prn\par     - will refer to advanced heart failure specialist, Brandon Briscoe MD (he will make an appointment after seeing me again in follow up)\par     - will follow with serial echocardiograms (ordered for time of next visit) \par     - if EF remains </= 35% will consider referral to heart rhythm specialist for primary prevention ICD\par \par 3. Dyslipidemia: \par    - continue atorvastatin 80mg po qd\par    - discussed therapeutic lifestyle changes to promote improved lipid metabolism \par    - will check lab work next visit\par \par 5. DM2:\par    - continue Jardiance 10mg po qd (proper urinary hygiene discussed) \par    - continue metformin 1000mg po bid\par    - continue insulin\par    - follow up with endocrinologist, HUE Parrish MD\par    - check lab work next visit \par \par \par An ECG was obtained to evaluate heart rhythm and screen for any underlying cardiac abnormalities.

## 2022-04-07 NOTE — REASON FOR VISIT
[Other: ____] : [unfilled] [FreeTextEntry1] : Diagnostic Tests:\par -----------------------------------------\par ECG:\par 04/07/22: sinus tachycardia at 112 bpm, SUNDEEP, recent inferior MI, recent anteroseptal MI.  \par -----------------------------------------\par Echo:\par 03/29/22: technically difficult, moderately-to-severely reduced LV function, akinesis septum, inferior wall, and apex.\par 03/19//22: technically difficult, moderately-to-severely reduced LV function, akinesis mid and apical anterior wall, mid anteroseptum, apical septum, and apex, mild-to-moderate MR. \par -----------------------------------------\par Carotid arteries:\par 03/20/22: sono: unilateral left: no hemodynamically significant stenosis. \par -----------------------------------------\par Cath:\par 03/19/22: LM mild, LAD prox 100%, LCX distal 80%, OM1 100%, OM2 100%, RCA mid 95%, CO 3.9 l/min, CI 1.8 l/min/m2, IABP placed, inotropes initiated.

## 2022-04-07 NOTE — HISTORY OF PRESENT ILLNESS
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 In the evening on Friday 03/18/22, he had the onset of nausea/emesis and feeling generally unwell. The symptoms persisted all night an on the subsequent morning , Saturday 03/19/22 he developed tachycardia and diaphoresis.  He was admitted to [facility] on 03/19/22 and was diagnosed with a subacute MI and cardiogenic shock.  On 03/19/22 he had an echocardiogram which revealed moderately-to-severely reduced LV function, akinesis mid and apical anterior wall, mid anteroseptum, apical septum, and apex, and mild-to-moderate MR. He was transferred to [facility] and underwent invasive coronary artery angiography on 03/19/22 which revealed LM mild, LAD prox 100%, LCX distal 80%, OM1 100%, OM2 100%, RCA mid 95%, CO 3.9 l/min, CI 1.8 l/min/m2, IABP placed, inotropes initiated. He then went on to have CABG x 3 (LIMA->LAD, SVG->OM2, SVG->RPDA) on 03/23/22.  He was initiated on guideline directed medical therapy and discharged home on 03/30/22.  He has been feeling better since discharge but still has mild resting tachycardia and lower extremity edema (left > right).  Of note, he has had a very stressful year to day with the passing of his mother and sister.  He has not taken Entresto for the past couple of days secondary to a nonproductive cough.

## 2022-04-07 NOTE — REVIEW OF SYSTEMS
[Negative] : Heme/Lymph [SOB] : shortness of breath [Dyspnea on exertion] : dyspnea during exertion [Lower Ext Edema] : lower extremity edema [Cough] : cough

## 2022-04-08 ENCOUNTER — APPOINTMENT (OUTPATIENT)
Dept: CARDIOTHORACIC SURGERY | Facility: CLINIC | Age: 67
End: 2022-04-08
Payer: MEDICARE

## 2022-04-08 RX ORDER — OXYCODONE 5 MG/1
5 TABLET ORAL
Qty: 40 | Refills: 0 | Status: COMPLETED | COMMUNITY
Start: 2022-03-31 | End: 2022-04-08

## 2022-04-12 ENCOUNTER — APPOINTMENT (OUTPATIENT)
Dept: HEART FAILURE | Facility: CLINIC | Age: 67
End: 2022-04-12

## 2022-04-23 DIAGNOSIS — K21.9 GASTRO-ESOPHAGEAL REFLUX DISEASE W/OUT ESOPHAGITIS: ICD-10-CM

## 2022-04-28 ENCOUNTER — TRANSCRIPTION ENCOUNTER (OUTPATIENT)
Age: 67
End: 2022-04-28

## 2022-05-03 ENCOUNTER — APPOINTMENT (OUTPATIENT)
Dept: ENDOCRINOLOGY | Facility: CLINIC | Age: 67
End: 2022-05-03
Payer: MEDICARE

## 2022-05-03 VITALS
DIASTOLIC BLOOD PRESSURE: 80 MMHG | SYSTOLIC BLOOD PRESSURE: 123 MMHG | WEIGHT: 202 LBS | HEART RATE: 110 BPM | BODY MASS INDEX: 29.83 KG/M2

## 2022-05-03 PROCEDURE — 99204 OFFICE O/P NEW MOD 45 MIN: CPT

## 2022-05-04 NOTE — PHYSICAL EXAM
[Alert] : alert [No Acute Distress] : no acute distress [No Proptosis] : no proptosis [No Lid Lag] : no lid lag [Normal Hearing] : hearing was normal [No LAD] : no lymphadenopathy [Thyroid Not Enlarged] : the thyroid was not enlarged [Clear to Auscultation] : lungs were clear to auscultation bilaterally [Normal S1, S2] : normal S1 and S2 [Regular Rhythm] : with a regular rhythm [No Edema] : no peripheral edema [Pedal Pulses Normal] : the pedal pulses are present [Normal Sensation on Monofilament Testing] : normal sensation on monofilament testing of lower extremities [Normal Affect] : the affect was normal [Normal Mood] : the mood was normal [Acanthosis Nigricans] : no acanthosis nigricans [Foot Ulcers] : no foot ulcers [de-identified] : (+) hair growth on feet.

## 2022-05-04 NOTE — HISTORY OF PRESENT ILLNESS
[FreeTextEntry1] : here with wife\par Went to ED in late March with SOB, fatigue, feeling badly, was admitted with MI, cardiogenic shock, underwent 3 vessel CABG on 3/23/2022.  During hospitalization, also found to have diabetes, new diagnosis.  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  Since then, his mother and sister passed away and his diet was not as good as it usually is.  Normally, he keeps an excellent diet and walks 8-10K steps/day.\par In hindsight, he was having symptoms of hyperglycemia 2 weeks prior to admission.\par He was discharged on glargine 28 units, Jardiance 10mg, and metformin 1g bid but he is hoping to eventually discontinue some medications.  He is not having side effects to Jardiance or metformin.\par He is testing his sugars at home, glucose log reviewed :  mostly in 100-150 range.  max 180.  This is a varying times of the day (morning and post meal)\par labs reviewed from  [facility] portal\par \par PMH:  CAD s/p MI, CABG, 3/2022\par diabetes \par \par Meds:\par Lantus 28 units hs\par metformin 1g bid, Jardiance 10mg\par atorvastatin 80mg, aspirin 81mg\par Entresto, metoprolol ER 25mg, spironolactone 25mg\par torsemide\par pantoprazole\par

## 2022-05-04 NOTE — ASSESSMENT
[FreeTextEntry1] : Diabetes, new diagnosis.  CAD.\par Sugars improved, and pt is very motivated and knowledgeable, so I anticipate he will be able to discontinue insulin soon.  Recommended adding GLP1 agonist which also has cardiac benefit:   Start Ozempic 0.25mg/week x 3 weeks and then titrate to 0.5mg/week dose.  Advised to eat slowly to prevent possible nausea and GERD side effects.   Reduce Lantus to 20 units/day and discontinue once Ozempic is up to 0.5mg/week dose.\par Cut dose in half if morning sugars are below 90, 3x/week or more.\par Titrate Jardiance to 25mg/day.  continue metformin.\par continue statin therapy\par labs to be done next week by another doctor but also will get labs prior to next visit\par RTO 2-3 months

## 2022-05-06 ENCOUNTER — APPOINTMENT (OUTPATIENT)
Dept: CARDIOTHORACIC SURGERY | Facility: CLINIC | Age: 67
End: 2022-05-06

## 2022-05-06 ENCOUNTER — NON-APPOINTMENT (OUTPATIENT)
Age: 67
End: 2022-05-06

## 2022-05-10 ENCOUNTER — APPOINTMENT (OUTPATIENT)
Dept: HEART AND VASCULAR | Facility: CLINIC | Age: 67
End: 2022-05-10
Payer: MEDICARE

## 2022-05-10 VITALS
DIASTOLIC BLOOD PRESSURE: 75 MMHG | BODY MASS INDEX: 30.07 KG/M2 | SYSTOLIC BLOOD PRESSURE: 121 MMHG | HEIGHT: 69 IN | WEIGHT: 203 LBS | HEART RATE: 96 BPM | OXYGEN SATURATION: 97 %

## 2022-05-10 PROCEDURE — 99215 OFFICE O/P EST HI 40 MIN: CPT

## 2022-05-10 NOTE — CARDIOLOGY SUMMARY
[de-identified] : \par EKG 4/7/22: sinus tachycardia, inferior infarct pattern, nonspecific T wave abnormalities \par  [de-identified] : \par TTE 3/29/22: LV 3.7 cm, LVEF 30%, LVOT VTI 15 cm, RV not well seen, no significant valvular abnormalities \par TTE 3/19/22: LVEF 20-25%\par  [de-identified] : \par [facility] 3/19/22: pLAD 100% stenosis, dCX 80% stenosis with occluded OM2, 95% mRCA stenosis\par

## 2022-05-10 NOTE — PHYSICAL EXAM
[Well Developed] : well developed [Well Nourished] : well nourished [Normal Venous Pressure] : normal venous pressure [Normal S1, S2] : normal S1, S2 [No Murmur] : no murmur [Clear Lung Fields] : clear lung fields [Good Air Entry] : good air entry [Soft] : abdomen soft [No Edema] : no edema [Moves all extremities] : moves all extremities [No Focal Deficits] : no focal deficits [Alert and Oriented] : alert and oriented [Normal memory] : normal memory [de-identified] : Warm

## 2022-05-10 NOTE — ASSESSMENT
[FreeTextEntry1] :  67 YO M with a history of ACC/AHA Stage C ICM (LV 3.7 cm, LVEF 30%), CAD s/p 3v CABG (LIMA-LAD, SVG-OM2, SVG-PDA) 3/2022, and newly diagnosed poorly controlled DM2 (A1c 11.2%) presenting to HF clinic for further management \par \par Today he reports NYHA I symptoms and appears euvolemic on exam. Will continue to uptitrate HF medical therapy.

## 2022-05-10 NOTE — HISTORY OF PRESENT ILLNESS
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 He had a new transient LBBB and TTE revealed severe LV dysfunction. LHC revealed severe 3v CAD and hemodynamics revealed CI of 1.6 for which IABP was placed. He underwent 3v CABG by Dr. Abad and did well post-operatively. He was initiated on low doses of HF medical therapy and has not had ED visits or hospitalizations since then.\par \par He presents today for consultation. He feels great overall. He is back to work mostly full time. Denies any chest pain or chest pressure. Denies any dyspnea on exertion. Denies orthopnea or edema. Weight has been stable at 200 lbs at home. Denies dizziness or lightheadedness. His BP at home is usually 100/60.

## 2022-05-10 NOTE — DISCUSSION/SUMMARY
[FreeTextEntry1] : # Chronic systolic heart failure\par - GDMT: current regimen is Entresto 24-26 mg BID, metoprolol succinate 25 mg daily, spironolactone 25 mg daily, and Jardiance 25 mg daily. will increase entresto to 49-51 mg BID and asked to increase metoprolol to 50 mg daily in 2 weeks\par - Diuretic: current regimen is torsemide 20 mg daily, will decrease to QOD after increasing Entresto \par - Device: will reassess LVEF July for ICD candidacy \par - Labs: 4/5 with Na 136, K 4.5, Cr 0.88. will repeat labs today\par \par # CAD s/p CABG 3/2022\par - on ASA and high potency statin \par - will check lipid profile \par \par # DM2\par - he is on Jardiance as well as metformin/ozempic and insulin\par - repeat A1c \par \par # Insomnia\par - will consider trazodone at next visit \par \par Return to clinic with telehealth in 1 month and in clinic at [facility] in 2 months with same day TTE

## 2022-05-11 LAB
24R-OH-CALCIDIOL SERPL-MCNC: 72.7 PG/ML
ALBUMIN SERPL ELPH-MCNC: 4.8 G/DL
ALP BLD-CCNC: 68 U/L
ALT SERPL-CCNC: 9 U/L
ANION GAP SERPL CALC-SCNC: 14 MMOL/L
AST SERPL-CCNC: 14 U/L
BILIRUB SERPL-MCNC: 0.3 MG/DL
BUN SERPL-MCNC: 14 MG/DL
CALCIUM SERPL-MCNC: 10.1 MG/DL
CHLORIDE SERPL-SCNC: 102 MMOL/L
CHOLEST SERPL-MCNC: 182 MG/DL
CO2 SERPL-SCNC: 23 MMOL/L
CREAT SERPL-MCNC: 0.68 MG/DL
EGFR: 102 ML/MIN/1.73M2
ESTIMATED AVERAGE GLUCOSE: 151 MG/DL
GLUCOSE SERPL-MCNC: 102 MG/DL
HBA1C MFR BLD HPLC: 6.9 %
HDLC SERPL-MCNC: 33 MG/DL
LDLC SERPL CALC-MCNC: 93 MG/DL
NONHDLC SERPL-MCNC: 149 MG/DL
NT-PROBNP SERPL-MCNC: 445 PG/ML
POTASSIUM SERPL-SCNC: 4.8 MMOL/L
PROT SERPL-MCNC: 7.3 G/DL
SODIUM SERPL-SCNC: 139 MMOL/L
TRIGL SERPL-MCNC: 278 MG/DL

## 2022-05-13 ENCOUNTER — APPOINTMENT (OUTPATIENT)
Dept: CARDIOTHORACIC SURGERY | Facility: CLINIC | Age: 67
End: 2022-05-13

## 2022-05-16 ENCOUNTER — NON-APPOINTMENT (OUTPATIENT)
Age: 67
End: 2022-05-16

## 2022-05-27 ENCOUNTER — NON-APPOINTMENT (OUTPATIENT)
Age: 67
End: 2022-05-27

## 2022-06-02 NOTE — REVIEW OF SYSTEMS
[SOB] : shortness of breath [Dyspnea on exertion] : dyspnea during exertion [Lower Ext Edema] : lower extremity edema [Cough] : cough [Negative] : Heme/Lymph

## 2022-06-07 ENCOUNTER — APPOINTMENT (OUTPATIENT)
Dept: HEART AND VASCULAR | Facility: CLINIC | Age: 67
End: 2022-06-07
Payer: MEDICARE

## 2022-06-07 VITALS
HEART RATE: 87 BPM | OXYGEN SATURATION: 98 % | BODY MASS INDEX: 29.92 KG/M2 | SYSTOLIC BLOOD PRESSURE: 98 MMHG | WEIGHT: 202 LBS | HEIGHT: 69 IN | TEMPERATURE: 96.9 F | DIASTOLIC BLOOD PRESSURE: 65 MMHG

## 2022-06-07 DIAGNOSIS — F32.A DEPRESSION, UNSPECIFIED: ICD-10-CM

## 2022-06-07 PROCEDURE — 93356 MYOCRD STRAIN IMG SPCKL TRCK: CPT | Mod: 59

## 2022-06-07 PROCEDURE — 99214 OFFICE O/P EST MOD 30 MIN: CPT | Mod: 25

## 2022-06-07 PROCEDURE — 93306 TTE W/DOPPLER COMPLETE: CPT | Mod: 59

## 2022-06-07 NOTE — REASON FOR VISIT
[Other: ____] : [unfilled] [FreeTextEntry1] : Diagnostic Tests:\par -----------------------------------------\par ECG:\par 04/07/22: sinus tachycardia at 112 bpm, SUNDEEP, recent inferior MI, recent anteroseptal MI.  \par -----------------------------------------\par Echo:\par 06/07/22: EF 50%, LV GLS -20.1%, akinesis mid anteroseptum, apical septum, apical inferior wall, and apex. \par 03/29/22: technically difficult, moderately-to-severely reduced LV function, akinesis septum, inferior wall, and apex.\par 03/19//22: technically difficult, moderately-to-severely reduced LV function, akinesis mid and apical anterior wall, mid anteroseptum, apical septum, and apex, mild-to-moderate MR. \par -----------------------------------------\par Carotid arteries:\par 03/20/22: sono: unilateral left: no hemodynamically significant stenosis. \par -----------------------------------------\par Cath:\par 03/19/22: LM mild, LAD prox 100%, LCX distal 80%, OM1 100%, OM2 100%, RCA mid 95%, CO 3.9 l/min, CI 1.8 l/min/m2, IABP placed, inotropes initiated.

## 2022-06-07 NOTE — PHYSICAL EXAM
[Well Developed] : well developed [Well Nourished] : well nourished [No Acute Distress] : no acute distress [Normal Conjunctiva] : normal conjunctiva [Normal Venous Pressure] : normal venous pressure [No Carotid Bruit] : no carotid bruit [Normal S1, S2] : normal S1, S2 [No Murmur] : no murmur [No Rub] : no rub [No Gallop] : no gallop [Clear Lung Fields] : clear lung fields [Good Air Entry] : good air entry [No Respiratory Distress] : no respiratory distress  [Soft] : abdomen soft [Non Tender] : non-tender [No Masses/organomegaly] : no masses/organomegaly [Normal Bowel Sounds] : normal bowel sounds [Normal Gait] : normal gait [No Cyanosis] : no cyanosis [No Clubbing] : no clubbing [No Varicosities] : no varicosities [No Rash] : no rash [No Skin Lesions] : no skin lesions [Moves all extremities] : moves all extremities [No Focal Deficits] : no focal deficits [Normal Speech] : normal speech [Alert and Oriented] : alert and oriented [Normal memory] : normal memory [de-identified] : + median sternotomy scar well healed  [de-identified] : 2+ LE edema (l > r), s/p b/l GSVT harvest scars

## 2022-06-22 RX ORDER — PEN NEEDLE, DIABETIC 32GX 5/32"
32G X 4 MM NEEDLE, DISPOSABLE MISCELLANEOUS
Qty: 90 | Refills: 0 | Status: ACTIVE | COMMUNITY
Start: 2022-06-22 | End: 1900-01-01

## 2022-06-29 ENCOUNTER — APPOINTMENT (OUTPATIENT)
Dept: ENDOCRINOLOGY | Facility: CLINIC | Age: 67
End: 2022-06-29

## 2022-07-06 ENCOUNTER — APPOINTMENT (OUTPATIENT)
Dept: HEART AND VASCULAR | Facility: CLINIC | Age: 67
End: 2022-07-06

## 2022-07-06 PROCEDURE — 99213 OFFICE O/P EST LOW 20 MIN: CPT | Mod: 95

## 2022-07-06 NOTE — CARDIOLOGY SUMMARY
[de-identified] : \par EKG 4/7/22: sinus tachycardia, inferior infarct pattern, nonspecific T wave abnormalities \par  [de-identified] : \par TTE 6/2022: LV 5.2 cm, LVEF 50% with regional WMAs worse in LAD territory, normal RV size/function, no significant valvular abnormalities \par TTE 3/29/22: LV 3.7 cm, LVEF 30%, LVOT VTI 15 cm, RV not well seen, no significant valvular abnormalities \par TTE 3/19/22: LVEF 20-25%\par  [de-identified] : \par [facility] 3/19/22: pLAD 100% stenosis, dCX 80% stenosis with occluded OM2, 95% mRCA stenosis\par

## 2022-07-06 NOTE — HISTORY OF PRESENT ILLNESS
[Home] : at home, [unfilled] , at the time of the visit. 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 He had a new transient LBBB and TTE revealed severe LV dysfunction. LHC revealed severe 3v CAD and hemodynamics revealed CI of 1.6 for which IABP was placed. He underwent 3v CABG by Dr. Abad and did well post-operatively. with revascularization and GDMT his LVEF has improved to 50% and he has had NYHA 1 symptoms.\par \par He presents today for followup via telehealth. He feels fantastic and is asymptomatic. His HR is 90's at rest and goes to 100's when exerting himself. His BP is typically 105/70 at home. He is returning today from vacation in [place]. Denies any chest pain or chest pressure. Denies any dyspnea on exertion. Denies orthopnea or edema.

## 2022-07-06 NOTE — DISCUSSION/SUMMARY
[FreeTextEntry1] : # Chronic systolic heart failure\par - GDMT: current regimen is Entresto 49-51 mg BID, metoprolol succinate 50 mg daily, spironolactone 25 mg daily, and Jardiance 25 mg daily. will continue. \par - Diuretic: current regimen is torsemide 20 mg PRN, will continue \par - Device: no indication given LVEF > 35%, would plan for repeat TTE ~summer 2023 \par - Labs: 5/10 with K 4.8 and Cr 0.68. will get repeat labs this month \par \par # CAD s/p CABG 3/2022\par - on ASA and high potency statin \par - follows with Dr. Finkelstein \par \par # DM2\par - he is on Jardiance as well as metformin/ozempic and insulin, A1c improving from peak of 11% \par \par Return to clinic in 5 months at [facility]

## 2022-07-06 NOTE — ASSESSMENT
[FreeTextEntry1] : 68 YO M with a history of ACC/AHA Stage C ICM with improved EF (LVEF 25->50%), CAD s/p 3v CABG (LIMA-LAD, SVG-OM2, SVG-PDA) 3/2022, and DM2 (A1c 6.9%) presenting to HF clinic for further management \par \par Today he reports NYHA I symptoms and historically appears euvolemic on exam.

## 2022-07-27 ENCOUNTER — NON-APPOINTMENT (OUTPATIENT)
Age: 67
End: 2022-07-27

## 2022-07-31 ENCOUNTER — RX RENEWAL (OUTPATIENT)
Age: 67
End: 2022-07-31

## 2022-08-03 ENCOUNTER — APPOINTMENT (OUTPATIENT)
Dept: ENDOCRINOLOGY | Facility: CLINIC | Age: 67
End: 2022-08-03

## 2022-08-06 ENCOUNTER — NON-APPOINTMENT (OUTPATIENT)
Age: 67
End: 2022-08-06

## 2022-08-11 LAB
ALBUMIN SERPL ELPH-MCNC: 4.8 G/DL
ALP BLD-CCNC: 54 U/L
ALT SERPL-CCNC: 14 U/L
ANION GAP SERPL CALC-SCNC: 17 MMOL/L
AST SERPL-CCNC: 11 U/L
BILIRUB SERPL-MCNC: 0.2 MG/DL
BUN SERPL-MCNC: 15 MG/DL
CALCIUM SERPL-MCNC: 9.8 MG/DL
CHLORIDE SERPL-SCNC: 102 MMOL/L
CO2 SERPL-SCNC: 23 MMOL/L
CREAT SERPL-MCNC: 0.9 MG/DL
EGFR: 94 ML/MIN/1.73M2
ESTIMATED AVERAGE GLUCOSE: 146 MG/DL
GLUCOSE SERPL-MCNC: 126 MG/DL
HBA1C MFR BLD HPLC: 6.7 %
NT-PROBNP SERPL-MCNC: 272 PG/ML
POTASSIUM SERPL-SCNC: 4.4 MMOL/L
PROT SERPL-MCNC: 7.1 G/DL
SODIUM SERPL-SCNC: 142 MMOL/L

## 2022-09-13 ENCOUNTER — LABORATORY RESULT (OUTPATIENT)
Age: 67
End: 2022-09-13

## 2022-09-13 ENCOUNTER — APPOINTMENT (OUTPATIENT)
Dept: HEART AND VASCULAR | Facility: CLINIC | Age: 67
End: 2022-09-13

## 2022-09-13 VITALS
SYSTOLIC BLOOD PRESSURE: 95 MMHG | TEMPERATURE: 97.8 F | WEIGHT: 197 LBS | HEIGHT: 69 IN | OXYGEN SATURATION: 96 % | BODY MASS INDEX: 29.18 KG/M2 | HEART RATE: 82 BPM | DIASTOLIC BLOOD PRESSURE: 59 MMHG

## 2022-09-13 DIAGNOSIS — F32.A ANXIETY DISORDER, UNSPECIFIED: ICD-10-CM

## 2022-09-13 DIAGNOSIS — F41.9 ANXIETY DISORDER, UNSPECIFIED: ICD-10-CM

## 2022-09-13 PROCEDURE — 99214 OFFICE O/P EST MOD 30 MIN: CPT

## 2022-09-13 NOTE — PHYSICAL EXAM
[Well Developed] : well developed [Well Nourished] : well nourished [No Acute Distress] : no acute distress [Normal Conjunctiva] : normal conjunctiva [Normal Venous Pressure] : normal venous pressure [No Carotid Bruit] : no carotid bruit [Normal S1, S2] : normal S1, S2 [No Murmur] : no murmur [No Rub] : no rub [No Gallop] : no gallop [Clear Lung Fields] : clear lung fields [Good Air Entry] : good air entry [No Respiratory Distress] : no respiratory distress  [Soft] : abdomen soft [Non Tender] : non-tender [No Masses/organomegaly] : no masses/organomegaly [Normal Bowel Sounds] : normal bowel sounds [Normal Gait] : normal gait [No Cyanosis] : no cyanosis [No Clubbing] : no clubbing [No Varicosities] : no varicosities [No Rash] : no rash [No Skin Lesions] : no skin lesions [Moves all extremities] : moves all extremities [No Focal Deficits] : no focal deficits [Normal Speech] : normal speech [Alert and Oriented] : alert and oriented [Normal memory] : normal memory [de-identified] : + median sternotomy scar well healed  [de-identified] : 2+ LE edema (l > r), s/p b/l GSVT harvest scars

## 2022-09-13 NOTE — ASSESSMENT
[FreeTextEntry1] : 1. CAD: s/p MI and cardiogenic shock (03/18/22), s/p CABG x 3 (LIMA->LAD, SVG->OM2, SVG->RPDA) (03/23/22): CCS 0\par      - continue aspirin 81mg po qd\par      - continue aggressive medical management\par      - will send for an exercise stress echocardiogram to rule out inducible ischemia \par \par 2. Chronic systolic heart failure: HFiEF: EF 50% (06/07/22)\par     - continue Entresto 49/51mg po bid \par     - continue Toprol XL 25mg po qd (will not up titrate at this time given relative hypotension)\par     - continue Jardiance 10mg po qd (discussed the importance of proper urinary hygiene) \par     - continue spironolactone 25mg po qd (will not up titrate at this time given relative hypotension)\par     - continue torsemide 20mg po qd prn\par     - continue follow up with heart failure specialist, Brandon Briscoe MD \par     - will follow with serial echocardiograms (stress echo ordered today)\par \par 3. Dyslipidemia: LDL 93 (05/11/22) \par    - continue rosuvastatin 40mg po qd to maximize CV prevention \par    - discussed therapeutic lifestyle changes to promote improved lipid metabolism \par \par 5. DM2: A1c 6.7% (08/11/22): markedly improved\par    - continue Jardiance 10mg po qd (proper urinary hygiene discussed) \par    - continue metformin 1000mg po bid\par    - continue insulin\par    - follow up with endocrinologist, HUE Parrish MD\par

## 2022-09-13 NOTE — HISTORY OF PRESENT ILLNESS
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 In the evening on Friday 03/18/22, he had the onset of nausea/emesis and feeling generally unwell. The symptoms persisted all night an on the subsequent morning, Saturday 03/19/22 he developed tachycardia and diaphoresis.  He was admitted to [facility] on 03/19/22 and was diagnosed with a subacute MI and cardiogenic shock.  On 03/19/22 he had an echocardiogram which revealed moderately-to-severely reduced LV function, akinesis mid and apical anterior wall, mid anteroseptum, apical septum, and apex, and mild-to-moderate MR. He was transferred to [facility] and underwent invasive coronary artery angiography on 03/19/22 which revealed LM mild, LAD prox 100%, LCX distal 80%, OM1 100%, OM2 100%, RCA mid 95%, CO 3.9 l/min, CI 1.8 l/min/m2, IABP placed, inotropes initiated. He then went on to have CABG x 3 (LIMA->LAD, SVG->OM2, SVG->RPDA) on 03/23/22.  He was initiated on guideline directed medical therapy and discharged home on 03/30/22.  Of note, he has had a very stressful year to day with the passing of his mother and sister. On 06/07/22, he had an echocardiogram which revealed an EF of 50%, LV GLS -20.1%, and akinesis mid anteroseptum, apical septum, apical inferior wall, and apex.  At present, he feels well.

## 2022-09-14 LAB
24R-OH-CALCIDIOL SERPL-MCNC: 87.1 PG/ML
ALBUMIN SERPL ELPH-MCNC: 4.7 G/DL
ALP BLD-CCNC: 57 U/L
ALT SERPL-CCNC: 14 U/L
ANION GAP SERPL CALC-SCNC: 14 MMOL/L
AST SERPL-CCNC: 10 U/L
BASOPHILS # BLD AUTO: 0.08 K/UL
BASOPHILS NFR BLD AUTO: 0.7 %
BILIRUB SERPL-MCNC: 0.2 MG/DL
BUN SERPL-MCNC: 15 MG/DL
CALCIUM SERPL-MCNC: 9.9 MG/DL
CHLORIDE SERPL-SCNC: 105 MMOL/L
CHOLEST SERPL-MCNC: 170 MG/DL
CO2 SERPL-SCNC: 22 MMOL/L
CREAT SERPL-MCNC: 0.82 MG/DL
EGFR: 96 ML/MIN/1.73M2
EOSINOPHIL # BLD AUTO: 0.06 K/UL
EOSINOPHIL NFR BLD AUTO: 0.5 %
ESTIMATED AVERAGE GLUCOSE: 143 MG/DL
FOLATE SERPL-MCNC: 12.6 NG/ML
GLUCOSE SERPL-MCNC: 122 MG/DL
HBA1C MFR BLD HPLC: 6.6 %
HCT VFR BLD CALC: 46.6 %
HDLC SERPL-MCNC: 44 MG/DL
HGB BLD-MCNC: 14.7 G/DL
IMM GRANULOCYTES NFR BLD AUTO: 0.2 %
LDLC SERPL CALC-MCNC: 89 MG/DL
LDLC SERPL DIRECT ASSAY-MCNC: 98 MG/DL
LYMPHOCYTES # BLD AUTO: 4.29 K/UL
LYMPHOCYTES NFR BLD AUTO: 35.7 %
MAN DIFF?: NORMAL
MCHC RBC-ENTMCNC: 29.1 PG
MCHC RBC-ENTMCNC: 31.5 GM/DL
MCV RBC AUTO: 92.3 FL
MONOCYTES # BLD AUTO: 0.62 K/UL
MONOCYTES NFR BLD AUTO: 5.2 %
NEUTROPHILS # BLD AUTO: 6.95 K/UL
NEUTROPHILS NFR BLD AUTO: 57.7 %
NONHDLC SERPL-MCNC: 126 MG/DL
PLATELET # BLD AUTO: 157 K/UL
POTASSIUM SERPL-SCNC: 4.4 MMOL/L
PROT SERPL-MCNC: 7.1 G/DL
RBC # BLD: 5.05 M/UL
RBC # FLD: 15.2 %
SODIUM SERPL-SCNC: 142 MMOL/L
TRIGL SERPL-MCNC: 183 MG/DL
TSH SERPL-ACNC: 1.23 UIU/ML
VIT B12 SERPL-MCNC: 356 PG/ML
WBC # FLD AUTO: 12.02 K/UL

## 2022-09-18 ENCOUNTER — RX RENEWAL (OUTPATIENT)
Age: 67
End: 2022-09-18

## 2022-09-19 ENCOUNTER — RX RENEWAL (OUTPATIENT)
Age: 67
End: 2022-09-19

## 2022-10-03 ENCOUNTER — NON-APPOINTMENT (OUTPATIENT)
Age: 67
End: 2022-10-03

## 2022-10-04 NOTE — BRIEF OPERATIVE NOTE - NS MD BRIEF OP ADULT TOTAL BYPASS TIME
Acne Type: Comedonal Lesions Detail Level: Detailed Extraction Method: 11 blade and comedo extractor Render Number Of Lesions Treated: no Prep Text (Optional): Prior to removal the treatment areas were prepped in the usual fashion. Consent was obtained and risks were reviewed including but not limited to scarring, infection, bleeding, scabbing, incomplete removal, and allergy to anesthesia. Post-Care Instructions: I reviewed with the patient in detail post-care instructions. Patient is to keep the treatment areas dry overnight, and then apply bacitracin twice daily until healed. Patient may apply hydrogen peroxide soaks to remove any crusting. 98

## 2022-10-06 ENCOUNTER — RX RENEWAL (OUTPATIENT)
Age: 67
End: 2022-10-06

## 2022-10-14 ENCOUNTER — APPOINTMENT (OUTPATIENT)
Dept: HEART FAILURE | Facility: CLINIC | Age: 67
End: 2022-10-14

## 2022-10-14 ENCOUNTER — NON-APPOINTMENT (OUTPATIENT)
Age: 67
End: 2022-10-14

## 2022-10-14 VITALS
SYSTOLIC BLOOD PRESSURE: 105 MMHG | HEART RATE: 91 BPM | OXYGEN SATURATION: 99 % | BODY MASS INDEX: 27.16 KG/M2 | HEIGHT: 71 IN | WEIGHT: 194 LBS | DIASTOLIC BLOOD PRESSURE: 73 MMHG

## 2022-10-14 PROCEDURE — 99214 OFFICE O/P EST MOD 30 MIN: CPT

## 2022-10-14 NOTE — PHYSICAL EXAM
[Alert and Oriented] : alert and oriented [Well Developed] : well developed [Well Nourished] : well nourished [Normal Venous Pressure] : normal venous pressure [Normal S1, S2] : normal S1, S2 [Clear Lung Fields] : clear lung fields [Good Air Entry] : good air entry [Soft] : abdomen soft [Normal Gait] : normal gait [No Edema] : no edema [Moves all extremities] : moves all extremities [No Focal Deficits] : no focal deficits [de-identified] : Warm

## 2022-10-14 NOTE — HISTORY OF PRESENT ILLNESS
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 3/2022 with nausea/emesis with a hsTrop and found to be in the 3000's. He had a new transient LBBB and TTE revealed severe LV dysfunction. LHC revealed severe 3v CAD and hemodynamics revealed CI of 1.6 for which IABP was placed. He underwent 3v CABG by Dr. Abad and did well post-operatively. with revascularization and GDMT his LVEF has improved to 50% and he has had NYHA 1 symptoms.\par \par He presents today for followup. He feels fantastic and is asymptomatic. Denies any chest pain or chest pressure. Denies any dyspnea on exertion. Denies orthopnea or edema.

## 2022-10-14 NOTE — DISCUSSION/SUMMARY
[FreeTextEntry1] : # Chronic systolic heart failure\par - GDMT: current regimen is Entresto 49-51 mg BID, metoprolol succinate 50 mg daily, spironolactone 25 mg daily, and Jardiance 25 mg daily. will continue. \par - Diuretic: current regimen is torsemide 20 mg PRN, will continue \par - Device: no indication given LVEF > 35%, will plan for surveillance TTE (last 6/2022)\par - Labs: 9/13 with K 4.4, Cr 0.8\par \par # CAD s/p CABG 3/2022\par - on ASA and high potency statin, last LDL 89 on 9/2022 and started on Repatha \par - follows with Dr. Finkelstein \par \par # DM2\par - he is on Jardiance as well as metformin/ozempic and insulin, A1c improving from peak of 11% \par \par Return to clinic in 6 months

## 2022-10-14 NOTE — CARDIOLOGY SUMMARY
[de-identified] : \par EKG 4/7/22: sinus tachycardia, inferior infarct pattern, nonspecific T wave abnormalities \par  [de-identified] : \par TTE 6/2022: LV 5.2 cm, LVEF 50% with regional WMAs worse in LAD territory, normal RV size/function, no significant valvular abnormalities \par \par TTE 3/29/22: LV 3.7 cm, LVEF 30%, LVOT VTI 15 cm, RV not well seen, no significant valvular abnormalities \par TTE 3/19/22: LVEF 20-25%\par  [de-identified] : \par [facility] 3/19/22: pLAD 100% stenosis, dCX 80% stenosis with occluded OM2, 95% mRCA stenosis\par

## 2022-10-14 NOTE — ASSESSMENT
[FreeTextEntry1] : 66 YO M with a history of ACC/AHA Stage C ICM with improved EF (LVEF 25->50%), CAD s/p 3v CABG (LIMA-LAD, SVG-OM2, SVG-PDA) 3/2022, and DM2 (A1c 6.9%) presenting to HF clinic for further management \par \par Today he reports NYHA I symptoms and appears euvolemic on exam.

## 2022-11-01 ENCOUNTER — APPOINTMENT (OUTPATIENT)
Dept: ENDOCRINOLOGY | Facility: CLINIC | Age: 67
End: 2022-11-01

## 2022-11-01 VITALS
HEART RATE: 93 BPM | BODY MASS INDEX: 26.78 KG/M2 | DIASTOLIC BLOOD PRESSURE: 71 MMHG | WEIGHT: 192 LBS | SYSTOLIC BLOOD PRESSURE: 106 MMHG

## 2022-11-01 PROCEDURE — 99214 OFFICE O/P EST MOD 30 MIN: CPT | Mod: 25

## 2022-11-01 PROCEDURE — 95249 CONT GLUC MNTR PT PROV EQP: CPT

## 2022-11-01 RX ORDER — METFORMIN HYDROCHLORIDE 1000 MG/1
1000 TABLET, EXTENDED RELEASE ORAL
Qty: 180 | Refills: 3 | Status: DISCONTINUED | COMMUNITY
Start: 2022-03-31 | End: 2022-11-01

## 2022-11-01 RX ORDER — FLASH GLUCOSE SCANNING READER
EACH MISCELLANEOUS
Qty: 1 | Refills: 0 | Status: DISCONTINUED | COMMUNITY
Start: 2022-05-24 | End: 2022-11-01

## 2022-11-01 RX ORDER — OXYCODONE 5 MG/1
5 TABLET ORAL DAILY
Qty: 10 | Refills: 0 | Status: DISCONTINUED | COMMUNITY
Start: 2022-03-31 | End: 2022-11-01

## 2022-11-01 RX ORDER — PANTOPRAZOLE 40 MG/1
40 TABLET, DELAYED RELEASE ORAL TWICE DAILY
Qty: 180 | Refills: 3 | Status: DISCONTINUED | COMMUNITY
Start: 2022-03-31 | End: 2022-11-01

## 2022-11-02 NOTE — DATA REVIEWED
[FreeTextEntry1] : 9/22  A1c 6.6%, tot chol 170, trig 183, HDL 44, LDL 89, TSH 1.23, B12 356\par 8/22 A1c 6.7%\par 3/22: A1c 11.4%

## 2022-11-02 NOTE — HISTORY OF PRESENT ILLNESS
[FreeTextEntry1] : here with wife\par Bjorn CGM report  reviewed: 14d average 127, 96% in range, 4% high, 97% CGM use  (Paying out of pocket)\par Daily graph showing sugars all in range, only a few meals going just out of range.   Usually will be the breakfast meal (from watermelon) or if eating out for dinner.  He stopped eating watermelon.\par After injecting Repatha, sugars are high for the next 2 days, so he has been using 10 units of Lantus for 2 days after injection.\par No hypoglycemia, says his sugar is never low.\par weight is 10 lb less than last visit.\par Having GI symptoms on metformin.\par EF improved to 55%\par labs reviewed from 9/22:  A1c 6.6%\par  \par \par PMH:  CAD s/p MI, CABG, 3/2022\par diabetes \par \par Meds:\par metformin ER 1g bid, Jardiance 10mg, Ozempic 1mg/week\par rosuvastatin 40mg,  aspirin 81mg, Repatha every 2 weeks\par Entresto, metoprolol ER 25mg, spironolactone 25mg\par torsemide\par pantoprazole\par

## 2022-11-02 NOTE — ASSESSMENT
[FreeTextEntry1] : Diabetes, at goal.   CAD.\par Continue Jardiance, Ozempic,  and metformin, but can reduce metformin dose to 1 tab BID to improve tolerability.\par Add repaglinide 1mg for meals that will cause high sugar (carbs, eating out)\par upgrade to Bjorn 3.\par continue statin therapy\par check urine albumin\par RTO 6 months

## 2022-12-13 ENCOUNTER — APPOINTMENT (OUTPATIENT)
Dept: HEART AND VASCULAR | Facility: CLINIC | Age: 67
End: 2022-12-13

## 2022-12-13 VITALS
HEIGHT: 71 IN | OXYGEN SATURATION: 98 % | DIASTOLIC BLOOD PRESSURE: 67 MMHG | HEART RATE: 91 BPM | TEMPERATURE: 97.3 F | BODY MASS INDEX: 27.66 KG/M2 | WEIGHT: 197.56 LBS | SYSTOLIC BLOOD PRESSURE: 102 MMHG

## 2022-12-13 PROCEDURE — 93351 STRESS TTE COMPLETE: CPT | Mod: 59

## 2022-12-13 PROCEDURE — 99214 OFFICE O/P EST MOD 30 MIN: CPT | Mod: 25

## 2022-12-13 NOTE — HISTORY OF PRESENT ILLNESS
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 In the evening on Friday 03/18/22, he had the onset of nausea/emesis and feeling generally unwell. The symptoms persisted all night an on the subsequent morning, Saturday 03/19/22 he developed tachycardia and diaphoresis.  He was admitted to [facility] on 03/19/22 and was diagnosed with a subacute MI and cardiogenic shock.  On 03/19/22 he had an echocardiogram which revealed moderately-to-severely reduced LV function, akinesis mid and apical anterior wall, mid anteroseptum, apical septum, and apex, and mild-to-moderate MR. He was transferred to [facility] and underwent invasive coronary artery angiography on 03/19/22 which revealed LM mild, LAD prox 100%, LCX distal 80%, OM1 100%, OM2 100%, RCA mid 95%, CO 3.9 l/min, CI 1.8 l/min/m2, IABP placed, inotropes initiated. He then went on to have CABG x 3 (LIMA->LAD, SVG->OM2, SVG->RPDA) on 03/23/22.  He was initiated on guideline directed medical therapy and discharged home on 03/30/22.  Of note, he has had a very stressful year to day with the passing of his mother and sister. On 06/07/22, he had an echocardiogram which revealed an EF of 50%, LV GLS -20.1%, and akinesis mid anteroseptum, apical septum, apical inferior wall, and apex.  At present, he feels well. Today, 12/13/22 he had an exercise stress echocardiogram which revealed an EF of 50%, akinesis mid anteroseptum, apical septum, apical inferior wall, and apex, and no new wall motion abnormalities post 7.1 METs of exercise.

## 2022-12-13 NOTE — REASON FOR VISIT
[FreeTextEntry1] : Diagnostic Tests:\par -----------------------------------------\par ECG:\par 10/14/22: sinus rhythm, old anterior MI. \par 04/07/22: sinus tachycardia at 112 bpm, SUNDEEP, recent inferior MI, recent anteroseptal MI.  \par -----------------------------------------\par Echo:\par 06/07/22: EF 50%, LV GLS -20.1%, akinesis mid anteroseptum, apical septum, apical inferior wall, and apex. \par 03/29/22: technically difficult, moderately-to-severely reduced LV function, akinesis septum, inferior wall, and apex.\par 03/19//22: technically difficult, moderately-to-severely reduced LV function, akinesis mid and apical anterior wall, mid anteroseptum, apical septum, and apex, mild-to-moderate MR. \par -----------------------------------------\par Carotid arteries:\par 03/20/22: sono: unilateral left: no hemodynamically significant stenosis. \par -----------------------------------------\par Cath:\par 03/19/22: LM mild, LAD prox 100%, LCX distal 80%, OM1 100%, OM2 100%, RCA mid 95%, CO 3.9 l/min, CI 1.8 l/min/m2, IABP placed, inotropes initiated. \par -----------------------------------------\par Stress tests:\par 12/13/22: exercise stress echo: EF 50%, akinesis mid anteroseptum, apical septum, apical inferior wall, and apex, and no new wall motion abnormalities post 7.1 METs of exercise.

## 2022-12-13 NOTE — ASSESSMENT
[FreeTextEntry1] : 1. CAD: s/p MI and cardiogenic shock (03/18/22), s/p CABG x 3 (LIMA->LAD, SVG->OM2, SVG->RPDA) (03/23/22): CCS 0\par      - continue aspirin 81mg po qd\par      - continue aggressive medical management\par      - will send for an exercise stress echocardiogram to rule out inducible ischemia \par \par 2. Chronic systolic heart failure: HFiEF: EF 50% (12/13/22):\par     - continue Entresto 49/51mg po bid \par     - continue Toprol XL 25mg po qd (will not up titrate at this time given relative hypotension)\par     - continue Jardiance 10mg po qd (discussed the importance of proper urinary hygiene) \par     - continue spironolactone 25mg po qd (will not up titrate at this time given relative hypotension)\par     - continue torsemide 20mg po qd prn\par     - continue follow up with heart failure specialist, Brandon Briscoe MD \par     - will follow with serial echocardiograms \par \par 3. Dyslipidemia: LDL 89 (09/13/22):\par    - continue rosuvastatin 40mg po qd to maximize CV prevention\par    - continue Repatha 140mg  sc q 2 weeks\par    - discussed therapeutic lifestyle changes to promote improved lipid metabolism \par    - check repeat lab work today \par \par 5. DM2: A1c 6.7% (08/11/22): markedly improved\par    - continue Jardiance 10mg po qd (proper urinary hygiene discussed) \par    - continue metformin 1000mg po bid\par    - continue insulin\par    - follow up with endocrinologist, HUE Parrish MD\par    - check repeat lab work today \par \par 6. Research: enrolled in the GLAS Registry (12/13/22): multi-center cross-sectional epidemiological study to estimate the prevalence (%) of patients with elevated Lp(a) in patients with established CVD\par

## 2022-12-13 NOTE — PHYSICAL EXAM
[de-identified] : + median sternotomy scar well healed  [de-identified] : 2+ LE edema (l > r), s/p b/l GSVT harvest scars

## 2022-12-28 LAB
ALBUMIN SERPL ELPH-MCNC: 4.5 G/DL
ALP BLD-CCNC: 48 U/L
ALT SERPL-CCNC: 10 U/L
ANION GAP SERPL CALC-SCNC: 9 MMOL/L
APO LP(A) SERPL-MCNC: <9 NMOL/L
AST SERPL-CCNC: 9 U/L
BASOPHILS # BLD AUTO: 0.07 K/UL
BASOPHILS NFR BLD AUTO: 0.6 %
BILIRUB SERPL-MCNC: 0.2 MG/DL
BUN SERPL-MCNC: 14 MG/DL
CALCIUM SERPL-MCNC: 9.8 MG/DL
CHLORIDE SERPL-SCNC: 102 MMOL/L
CHOLEST SERPL-MCNC: 102 MG/DL
CO2 SERPL-SCNC: 28 MMOL/L
CREAT SERPL-MCNC: 0.74 MG/DL
EGFR: 99 ML/MIN/1.73M2
EOSINOPHIL # BLD AUTO: 0.1 K/UL
EOSINOPHIL NFR BLD AUTO: 0.9 %
ESTIMATED AVERAGE GLUCOSE: 137 MG/DL
GLUCOSE SERPL-MCNC: 51 MG/DL
HBA1C MFR BLD HPLC: 6.4 %
HCT VFR BLD CALC: 43.3 %
HDLC SERPL-MCNC: 47 MG/DL
HGB BLD-MCNC: 13.9 G/DL
IMM GRANULOCYTES NFR BLD AUTO: 0.3 %
LDLC SERPL DIRECT ASSAY-MCNC: 34 MG/DL
LYMPHOCYTES # BLD AUTO: 4.57 K/UL
LYMPHOCYTES NFR BLD AUTO: 40.7 %
MAN DIFF?: NORMAL
MCHC RBC-ENTMCNC: 29.6 PG
MCHC RBC-ENTMCNC: 32.1 GM/DL
MCV RBC AUTO: 92.1 FL
MONOCYTES # BLD AUTO: 0.71 K/UL
MONOCYTES NFR BLD AUTO: 6.3 %
NEUTROPHILS # BLD AUTO: 5.76 K/UL
NEUTROPHILS NFR BLD AUTO: 51.2 %
PLATELET # BLD AUTO: 194 K/UL
POTASSIUM SERPL-SCNC: 4.6 MMOL/L
PROT SERPL-MCNC: 6.8 G/DL
RBC # BLD: 4.7 M/UL
RBC # FLD: 13.5 %
SODIUM SERPL-SCNC: 139 MMOL/L
TRIGL SERPL-MCNC: 124 MG/DL
TSH SERPL-ACNC: 1.27 UIU/ML
WBC # FLD AUTO: 11.24 K/UL

## 2023-01-05 ENCOUNTER — NON-APPOINTMENT (OUTPATIENT)
Age: 68
End: 2023-01-05

## 2023-02-06 DIAGNOSIS — Z00.00 ENCOUNTER FOR GENERAL ADULT MEDICAL EXAMINATION W/OUT ABNORMAL FINDINGS: ICD-10-CM

## 2023-02-14 ENCOUNTER — LABORATORY RESULT (OUTPATIENT)
Age: 68
End: 2023-02-14

## 2023-02-14 LAB
24R-OH-CALCIDIOL SERPL-MCNC: 58.7 PG/ML
ALBUMIN SERPL ELPH-MCNC: 4.9 G/DL
ALP BLD-CCNC: 58 U/L
ALT SERPL-CCNC: 13 U/L
ANION GAP SERPL CALC-SCNC: 16 MMOL/L
AST SERPL-CCNC: 12 U/L
BILIRUB SERPL-MCNC: 0.2 MG/DL
BUN SERPL-MCNC: 16 MG/DL
CALCIUM SERPL-MCNC: 10.4 MG/DL
CHLORIDE SERPL-SCNC: 100 MMOL/L
CHOLEST SERPL-MCNC: 142 MG/DL
CK SERPL-CCNC: 34 U/L
CO2 SERPL-SCNC: 24 MMOL/L
CREAT SERPL-MCNC: 0.83 MG/DL
EGFR: 96 ML/MIN/1.73M2
ESTIMATED AVERAGE GLUCOSE: 134 MG/DL
GLUCOSE SERPL-MCNC: 63 MG/DL
HBA1C MFR BLD HPLC: 6.3 %
HDLC SERPL-MCNC: 48 MG/DL
LDLC SERPL DIRECT ASSAY-MCNC: 68 MG/DL
NT-PROBNP SERPL-MCNC: 97 PG/ML
POTASSIUM SERPL-SCNC: 4.5 MMOL/L
PROT SERPL-MCNC: 7.3 G/DL
SODIUM SERPL-SCNC: 140 MMOL/L
TRIGL SERPL-MCNC: 147 MG/DL
TSH SERPL-ACNC: 1.78 UIU/ML

## 2023-02-15 LAB
BASOPHILS # BLD AUTO: 0.12 K/UL
BASOPHILS NFR BLD AUTO: 0.8 %
CREAT SPEC-SCNC: 51 MG/DL
EOSINOPHIL # BLD AUTO: 0.11 K/UL
EOSINOPHIL NFR BLD AUTO: 0.8 %
HCT VFR BLD CALC: 48.5 %
HGB BLD-MCNC: 15.2 G/DL
IMM GRANULOCYTES NFR BLD AUTO: 0.3 %
LYMPHOCYTES # BLD AUTO: 5.13 K/UL
LYMPHOCYTES NFR BLD AUTO: 36.3 %
MAN DIFF?: NORMAL
MCHC RBC-ENTMCNC: 28.6 PG
MCHC RBC-ENTMCNC: 31.3 GM/DL
MCV RBC AUTO: 91.3 FL
MICROALBUMIN 24H UR DL<=1MG/L-MCNC: <1.2 MG/DL
MICROALBUMIN/CREAT 24H UR-RTO: NORMAL MG/G
MONOCYTES # BLD AUTO: 0.88 K/UL
MONOCYTES NFR BLD AUTO: 6.2 %
NEUTROPHILS # BLD AUTO: 7.84 K/UL
NEUTROPHILS NFR BLD AUTO: 55.6 %
PLATELET # BLD AUTO: 154 K/UL
RBC # BLD: 5.31 M/UL
RBC # FLD: 14.1 %
WBC # FLD AUTO: 14.12 K/UL

## 2023-03-27 ENCOUNTER — RX RENEWAL (OUTPATIENT)
Age: 68
End: 2023-03-27

## 2023-03-31 ENCOUNTER — APPOINTMENT (OUTPATIENT)
Dept: HEART FAILURE | Facility: CLINIC | Age: 68
End: 2023-03-31
Payer: MEDICARE

## 2023-03-31 VITALS
SYSTOLIC BLOOD PRESSURE: 123 MMHG | DIASTOLIC BLOOD PRESSURE: 78 MMHG | BODY MASS INDEX: 27.44 KG/M2 | WEIGHT: 196 LBS | TEMPERATURE: 98.4 F | HEART RATE: 86 BPM | OXYGEN SATURATION: 99 % | HEIGHT: 71 IN

## 2023-03-31 PROCEDURE — 99214 OFFICE O/P EST MOD 30 MIN: CPT

## 2023-03-31 NOTE — HISTORY OF PRESENT ILLNESS
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 He had a new transient LBBB and TTE revealed severe LV dysfunction. LHC revealed severe 3v CAD and hemodynamics revealed CI of 1.6 for which IABP was placed. He underwent 3v CABG by Dr. Abad and did well post-operatively. with revascularization and GDMT his LVEF has improved to 50% and he has had NYHA 1 symptoms.\par \par He presents today for followup. He feels well and is asymptomatic. Denies any chest pain or chest pressure. Denies any dyspnea on exertion. Denies orthopnea or edema.

## 2023-03-31 NOTE — PHYSICAL EXAM
[Well Developed] : well developed [Well Nourished] : well nourished [Normal Venous Pressure] : normal venous pressure [Normal S1, S2] : normal S1, S2 [Clear Lung Fields] : clear lung fields [Good Air Entry] : good air entry [Soft] : abdomen soft [Normal Gait] : normal gait [No Edema] : no edema [Moves all extremities] : moves all extremities [No Focal Deficits] : no focal deficits [Alert and Oriented] : alert and oriented [de-identified] : Warm

## 2023-03-31 NOTE — DISCUSSION/SUMMARY
[FreeTextEntry1] : # Chronic systolic heart failure\par - GDMT: current regimen is Entresto 49-51 mg BID, metoprolol succinate 50 mg daily, spironolactone 25 mg daily, and Jardiance 25 mg daily. BP ok today but typically SBP in 100's and has improved LVEF so will conitnue without uptitrating today. \par - Diuretic: current regimen is torsemide 20 mg PRN, will continue \par - Device: no indication given LVEF > 35%, would plan for surveillance TTE (last 12/2022)\par - Labs: 2/14 with K 4.5 and Cr 0.8\par \par # CAD s/p CABG 3/2022\par - on ASA and high potency statin, as well as Repatha, last LDL 34 on 12/2022\par - follows with Dr. Finkelstein \par \par # DM2\par - he is on Jardiance as well as metformin/ozempic and insulin, A1c normalized from peak of 11% \par \par Return to clinic in 6 months via telehealth

## 2023-03-31 NOTE — CARDIOLOGY SUMMARY
[de-identified] : \par EKG 4/7/22: sinus tachycardia, inferior infarct pattern, nonspecific T wave abnormalities \par  [de-identified] : \par Exercise stress echo 12/2022: LVEF 50%, no ischemia \par  [de-identified] : \par TTE 6/2022: LV 5.2 cm, LVEF 50% with regional WMAs worse in LAD territory, normal RV size/function, no significant valvular abnormalities \par \par TTE 3/29/22: LV 3.7 cm, LVEF 30%, LVOT VTI 15 cm, RV not well seen, no significant valvular abnormalities \par TTE 3/19/22: LVEF 20-25%\par  [de-identified] : \par [facility] 3/19/22: pLAD 100% stenosis, dCX 80% stenosis with occluded OM2, 95% mRCA stenosis\par

## 2023-04-07 PROBLEM — Z09 POSTOPERATIVE FOLLOW-UP: Status: RESOLVED | Noted: 2022-04-07 | Resolved: 2023-04-07

## 2023-04-11 ENCOUNTER — APPOINTMENT (OUTPATIENT)
Dept: HEART AND VASCULAR | Facility: CLINIC | Age: 68
End: 2023-04-11
Payer: MEDICARE

## 2023-04-11 ENCOUNTER — NON-APPOINTMENT (OUTPATIENT)
Age: 68
End: 2023-04-11

## 2023-04-11 VITALS
HEIGHT: 71 IN | DIASTOLIC BLOOD PRESSURE: 51 MMHG | SYSTOLIC BLOOD PRESSURE: 84 MMHG | WEIGHT: 200 LBS | HEART RATE: 84 BPM | OXYGEN SATURATION: 97 % | BODY MASS INDEX: 28 KG/M2

## 2023-04-11 VITALS — SYSTOLIC BLOOD PRESSURE: 112 MMHG | DIASTOLIC BLOOD PRESSURE: 74 MMHG

## 2023-04-11 DIAGNOSIS — Z09 ENCOUNTER FOR FOLLOW-UP EXAMINATION AFTER COMPLETED TREATMENT FOR CONDITIONS OTHER THAN MALIGNANT NEOPLASM: ICD-10-CM

## 2023-04-11 PROCEDURE — 99214 OFFICE O/P EST MOD 30 MIN: CPT | Mod: 25

## 2023-04-11 PROCEDURE — 93000 ELECTROCARDIOGRAM COMPLETE: CPT

## 2023-04-11 NOTE — PHYSICAL EXAM
[de-identified] : + median sternotomy scar well healed  [de-identified] : 2+ LE edema (l > r), s/p b/l GSVT harvest scars

## 2023-04-11 NOTE — ASSESSMENT
[FreeTextEntry1] : 1. CAD: s/p MI and cardiogenic shock (03/18/22), s/p CABG x 3 (LIMA->LAD, SVG->OM2, SVG->RPDA) (03/23/22): CCS 0: \par      - continue aspirin 81mg po qd\par      - continue aggressive medical management\par \par 2. Chronic systolic heart failure: HFiEF: EF 50% (12/13/22):\par     - continue Entresto 49/51mg po bid \par     - continue Toprol XL 25mg po qd (will not up titrate at this time given relative hypotension)\par     - continue Jardiance 10mg po qd (discussed the importance of proper urinary hygiene) \par     - continue spironolactone 25mg po qd (will not up titrate at this time given relative hypotension)\par     - continue torsemide 20mg po qd prn\par     - continue follow up with heart failure specialist, Brandon Briscoe MD \par     - will follow with serial echocardiograms (ordered today) \par \par 3. Dyslipidemia: LDL 68 (02/14/23): \par    - continue rosuvastatin 40mg po qd to maximize CV prevention\par    - continue Repatha 140mg  sc q 2 weeks\par    - discussed therapeutic lifestyle changes to promote improved lipid metabolism \par    - check lab work today \par \par 5. DM2: A1c 6.3% (02/14/23): \par    - continue Jardiance 10mg po qd (proper urinary hygiene discussed) \par    - continue metformin 1000mg po bid\par    - continue insulin\par    - follow up with endocrinologist, HUE Parrish MD\par    - check lab work today \par \par 6. Research: enrolled in the GLAS Registry (12/13/22): multi-center cross-sectional epidemiological study to estimate the prevalence (%) of patients with elevated Lp(a) in patients with established CVD\par

## 2023-04-11 NOTE — HISTORY OF PRESENT ILLNESS
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 In the evening on Friday 03/18/22, he had the onset of nausea/emesis and feeling generally unwell. The symptoms persisted all night an on the subsequent morning, Saturday 03/19/22 he developed tachycardia and diaphoresis.  He was admitted to [facility] on 03/19/22 and was diagnosed with a subacute MI and cardiogenic shock.  On 03/19/22 he had an echocardiogram which revealed moderately-to-severely reduced LV function, akinesis mid and apical anterior wall, mid anteroseptum, apical septum, and apex, and mild-to-moderate MR. He was transferred to [facility] and underwent invasive coronary artery angiography on 03/19/22 which revealed LM mild, LAD prox 100%, LCX distal 80%, OM1 100%, OM2 100%, RCA mid 95%, CO 3.9 l/min, CI 1.8 l/min/m2, IABP placed, inotropes initiated. He then went on to have CABG x 3 (LIMA->LAD, SVG->OM2, SVG->RPDA) on 03/23/22.  He was initiated on guideline directed medical therapy and discharged home on 03/30/22.   On 06/07/22, he had an echocardiogram which revealed an EF of 50%, LV GLS -20.1%, and akinesis mid anteroseptum, apical septum, apical inferior wall, and apex. On 12/13/22 he had an exercise stress echocardiogram which revealed an EF of 50%, akinesis mid anteroseptum, apical septum, apical inferior wall, and apex, and no new wall motion abnormalities post 7.1 METs of exercise.  At present, he denies chest pain and is doing well overall.

## 2023-04-11 NOTE — REASON FOR VISIT
[FreeTextEntry1] : Diagnostic Tests:\par -----------------------------------------\par ECG:\par 04/11/23: sinus rhythm, old anterior and inferior MI.  \par 10/14/22: sinus rhythm, old anterior MI. \par 04/07/22: sinus tachycardia at 112 bpm, SUNDEEP, recent inferior MI, recent anteroseptal MI.  \par -----------------------------------------\par Echo:\par 06/07/22: EF 50%, LV GLS -20.1%, akinesis mid anteroseptum, apical septum, apical inferior wall, and apex. \par 03/29/22: technically difficult, moderately-to-severely reduced LV function, akinesis septum, inferior wall, and apex.\par 03/19//22: technically difficult, moderately-to-severely reduced LV function, akinesis mid and apical anterior wall, mid anteroseptum, apical septum, and apex, mild-to-moderate MR. \par -----------------------------------------\par Carotid arteries:\par 03/20/22: sono: unilateral left: no hemodynamically significant stenosis. \par -----------------------------------------\par Cath:\par 03/19/22: LM mild, LAD prox 100%, LCX distal 80%, OM1 100%, OM2 100%, RCA mid 95%, CO 3.9 l/min, CI 1.8 l/min/m2, IABP placed, inotropes initiated. \par -----------------------------------------\par Stress tests:\par 12/13/22: exercise stress echo: EF 50%, akinesis mid anteroseptum, apical septum, apical inferior wall, and apex, and no new wall motion abnormalities post 7.1 METs of exercise.

## 2023-04-12 LAB
24R-OH-CALCIDIOL SERPL-MCNC: 103 PG/ML
ALBUMIN SERPL ELPH-MCNC: 4.9 G/DL
ALP BLD-CCNC: 59 U/L
ALT SERPL-CCNC: 17 U/L
ANION GAP SERPL CALC-SCNC: 16 MMOL/L
AST SERPL-CCNC: 19 U/L
BASOPHILS # BLD AUTO: 0.1 K/UL
BASOPHILS NFR BLD AUTO: 0.8 %
BILIRUB SERPL-MCNC: 0.3 MG/DL
BUN SERPL-MCNC: 17 MG/DL
CALCIUM SERPL-MCNC: 9.9 MG/DL
CHLORIDE SERPL-SCNC: 101 MMOL/L
CHOLEST SERPL-MCNC: 96 MG/DL
CO2 SERPL-SCNC: 21 MMOL/L
CREAT SERPL-MCNC: 0.79 MG/DL
EGFR: 97 ML/MIN/1.73M2
EOSINOPHIL # BLD AUTO: 0.1 K/UL
EOSINOPHIL NFR BLD AUTO: 0.8 %
ESTIMATED AVERAGE GLUCOSE: 134 MG/DL
GLUCOSE SERPL-MCNC: 87 MG/DL
HBA1C MFR BLD HPLC: 6.3 %
HCT VFR BLD CALC: 49.6 %
HDLC SERPL-MCNC: 50 MG/DL
HGB BLD-MCNC: 15.8 G/DL
IMM GRANULOCYTES NFR BLD AUTO: 0.3 %
LDLC SERPL DIRECT ASSAY-MCNC: 25 MG/DL
LYMPHOCYTES # BLD AUTO: 4.34 K/UL
LYMPHOCYTES NFR BLD AUTO: 34.3 %
MAN DIFF?: NORMAL
MCHC RBC-ENTMCNC: 29.5 PG
MCHC RBC-ENTMCNC: 31.9 GM/DL
MCV RBC AUTO: 92.7 FL
MONOCYTES # BLD AUTO: 0.77 K/UL
MONOCYTES NFR BLD AUTO: 6.1 %
NEUTROPHILS # BLD AUTO: 7.31 K/UL
NEUTROPHILS NFR BLD AUTO: 57.7 %
NT-PROBNP SERPL-MCNC: 67 PG/ML
PLATELET # BLD AUTO: 283 K/UL
POTASSIUM SERPL-SCNC: 5.4 MMOL/L
PROT SERPL-MCNC: 7.4 G/DL
RBC # BLD: 5.35 M/UL
RBC # FLD: 14.3 %
SODIUM SERPL-SCNC: 138 MMOL/L
TRIGL SERPL-MCNC: 122 MG/DL
TSH SERPL-ACNC: 1.99 UIU/ML
WBC # FLD AUTO: 12.66 K/UL

## 2023-04-24 ENCOUNTER — RX RENEWAL (OUTPATIENT)
Age: 68
End: 2023-04-24

## 2023-05-03 ENCOUNTER — APPOINTMENT (OUTPATIENT)
Dept: ENDOCRINOLOGY | Facility: CLINIC | Age: 68
End: 2023-05-03
Payer: MEDICARE

## 2023-05-03 VITALS
HEART RATE: 84 BPM | DIASTOLIC BLOOD PRESSURE: 55 MMHG | WEIGHT: 197 LBS | BODY MASS INDEX: 27.48 KG/M2 | SYSTOLIC BLOOD PRESSURE: 96 MMHG

## 2023-05-03 LAB
GLUCOSE BLDC GLUCOMTR-MCNC: 84
HBA1C MFR BLD HPLC: 6.4

## 2023-05-03 PROCEDURE — 83036 HEMOGLOBIN GLYCOSYLATED A1C: CPT | Mod: QW

## 2023-05-03 PROCEDURE — 99214 OFFICE O/P EST MOD 30 MIN: CPT | Mod: 25

## 2023-05-03 PROCEDURE — 82962 GLUCOSE BLOOD TEST: CPT

## 2023-05-03 RX ORDER — INSULIN GLARGINE 100 [IU]/ML
100 INJECTION, SOLUTION SUBCUTANEOUS
Qty: 15 | Refills: 0 | Status: DISCONTINUED | COMMUNITY
Start: 2022-05-27 | End: 2023-05-03

## 2023-05-03 NOTE — ASSESSMENT
[FreeTextEntry1] : Diabetes, at goal.   CAD.\par Studies have not shown that A1c lower than 7% is beneficial and I don't want to add more medication just to drive A1c lower.   If he can achieve A1c < 6.5% without hypoglycemia (which he has done), then that is good.\par Continue Jardiance 25mg, Ozempic 2mg/week,  metformin 500mg bid.  Take repaglinide for when he eats out; he doesn't have to take it when eating at home.\par Suggested replacing watermelon with berries (lower glycemic index fruit)\par Continue regular exercise.\par continue statin therapy\par rupali recommended \par RTO 6 months

## 2023-05-03 NOTE — DATA REVIEWED
[FreeTextEntry1] : 4/23  A1c 6.3%, tot chol 96, trig 122, HDL 50, LDL 25, tSH 1.99\par 2/23  A1c 6.3%, urine alb < 1.2\par 12/22  A1c 6.4%\par 9/22  A1c 6.6%, tot chol 170, trig 183, HDL 44, LDL 89, TSH 1.23, B12 356\par 8/22 A1c 6.7%\par 3/22: A1c 11.4%

## 2023-05-03 NOTE — PHYSICAL EXAM
[Alert] : alert [No Acute Distress] : no acute distress [No Proptosis] : no proptosis [No Lid Lag] : no lid lag [Normal Hearing] : hearing was normal [No LAD] : no lymphadenopathy [Thyroid Not Enlarged] : the thyroid was not enlarged [Clear to Auscultation] : lungs were clear to auscultation bilaterally [Normal S1, S2] : normal S1 and S2 [Regular Rhythm] : with a regular rhythm [No Edema] : no peripheral edema [Normal Sensation on Monofilament Testing] : normal sensation on monofilament testing of lower extremities [Normal Affect] : the affect was normal [Normal Mood] : the mood was normal [Foot Ulcers] : no foot ulcers [de-identified] : 1+ DP pulses [de-identified] : some dystrophic nails

## 2023-05-03 NOTE — HISTORY OF PRESENT ILLNESS
[FreeTextEntry1] : here with wife\par Bjorn CGM report  reviewed: 14d average 133, 94% in range, 6% high, 97% CGM use  (Paying out of pocket)\par Daily graph showing sugars mostly in range, highest sugar is usually after breakfast, which is omelet, toast and watermelon.\par No hypoglycemia episodes.\par he is walking 10K steps/day\par he has not seen ophtho recently\par He is going to [place] next week, for one week\par labs reviewed from 4/23:  A1c 6.3%, LDL 25, normal TSH\par  \par \par PMH:  CAD s/p MI, CABG, 3/2022\par diabetes \par \par Meds:\par metformin ER 500mg bid, Jardiance 10mg, Ozempic 2mg/week, repaglinide 1mg BI\par rosuvastatin 40mg,  aspirin 81mg, Repatha every 2 weeks\par Entresto, metoprolol ER 25mg, spironolactone 25mg\par torsemide\par pantoprazole\par

## 2023-06-22 ENCOUNTER — RX RENEWAL (OUTPATIENT)
Age: 68
End: 2023-06-22

## 2023-07-18 ENCOUNTER — APPOINTMENT (OUTPATIENT)
Dept: HEART AND VASCULAR | Facility: CLINIC | Age: 68
End: 2023-07-18
Payer: MEDICARE

## 2023-07-18 PROCEDURE — 93306 TTE W/DOPPLER COMPLETE: CPT

## 2023-07-19 ENCOUNTER — NON-APPOINTMENT (OUTPATIENT)
Age: 68
End: 2023-07-19

## 2023-08-15 ENCOUNTER — APPOINTMENT (OUTPATIENT)
Dept: HEART AND VASCULAR | Facility: CLINIC | Age: 68
End: 2023-08-15
Payer: MEDICARE

## 2023-08-15 VITALS
HEART RATE: 82 BPM | BODY MASS INDEX: 27.72 KG/M2 | WEIGHT: 198 LBS | DIASTOLIC BLOOD PRESSURE: 70 MMHG | HEIGHT: 71 IN | SYSTOLIC BLOOD PRESSURE: 110 MMHG

## 2023-08-15 PROCEDURE — 99214 OFFICE O/P EST MOD 30 MIN: CPT | Mod: 95

## 2023-08-15 NOTE — PHYSICAL EXAM
[de-identified] : + median sternotomy scar well healed  [de-identified] : 2+ LE edema (l > r), s/p b/l GSVT harvest scars

## 2023-08-15 NOTE — ASSESSMENT
[FreeTextEntry1] : ================================================================= 1. CAD: s/p MI and cardiogenic shock (03/18/22), s/p CABG x 3 (LIMA->LAD, SVG->OM2, SVG->RPDA) (03/23/22): CCS 0:       - continue aspirin 81mg po qd      - continue aggressive medical management  2. Chronic systolic heart failure: HFiEF: EF 51% (07/18/23):      - continue Entresto 49/51mg po bid      - continue Toprol XL 25mg po qd (will not up titrate at this time given relative hypotension)     - continue Jardiance 10mg po qd (discussed the importance of proper urinary hygiene)      - continue spironolactone 25mg po qd (will not up titrate at this time given relative hypotension)     - continue torsemide 20mg po qd prn     - continue follow up with heart failure specialist, Brandon Briscoe MD   3. Dyslipidemia: LDL 25 (04/11/23):     - continue rosuvastatin 40mg po qd to maximize CV prevention    - continue Repatha 140mg  sc q 2 weeks    - discussed therapeutic lifestyle changes to promote improved lipid metabolism     - check lab work next visit  5. DM2: A1c 6.3% (04/11/23):      - continue Jardiance 10mg po qd (proper urinary hygiene discussed)     - continue metformin 1000mg po bid    - continue insulin    - follow up with endocrinologist, HUE Parrish MD    - check lab work next visit   6. Research: enrolled in the GLAS Registry (12/13/22): multi-center cross-sectional epidemiological study to estimate the prevalence (%) of patients with elevated Lp(a) in patients with established CVD  7. r/o AAA:     - will send for an abdominal aortic ultrasound  8. r/o Carotid artery atherosclerosis:      - will send for a carotid artery sonograms   This encounter was performed as a telehealth encounter employing the [employer], HEROZ, or other approved audio/video platform.  All components of the evaluation and management were performed per clinical routine with the exception of the physical exam.  The physical exam references my most recent physical exam plus any additional information provided by the patient (i.e. ambulatory vitals/weight) or inspection from the video portion of the encounter.   I spent in excess of 40 minutes on the encounter.    Verbal consent was given on 08/15/23 by Dr. Julian.   Patient location: The patient was located at the primary address listed in the medical record. Physician location: I was located in my office in [facility].

## 2023-08-15 NOTE — REASON FOR VISIT
[FreeTextEntry1] : ============================================================================== Diagnostic Tests: ----------------------------------------- EC23: sinus rhythm, old anterior and inferior MI.   10/14/22: sinus rhythm, old anterior MI.  22: sinus tachycardia at 112 bpm, SUNDEEP, recent inferior MI, recent anteroseptal MI.   ----------------------------------------- Echo: 23: EF 51%, hypokinesis mid anteroseptum, apical septum, and apical inferior wall, grade I diastolic dysfunction, aortic sclerosis. 22: EF 50%, LV GLS -20.1%, akinesis mid anteroseptum, apical septum, apical inferior wall, and apex.  22: technically difficult, moderately-to-severely reduced LV function, akinesis septum, inferior wall, and apex. 22: technically difficult, moderately-to-severely reduced LV function, akinesis mid and apical anterior wall, mid anteroseptum, apical septum, and apex, mild-to-moderate MR.  ----------------------------------------- Carotid arteries: 22: sono: unilateral left: no hemodynamically significant stenosis.  ----------------------------------------- Cath: 22: LM mild, LAD prox 100%, LCX distal 80%, OM1 100%, OM2 100%, RCA mid 95%, CO 3.9 l/min, CI 1.8 l/min/m2, IABP placed, inotropes initiated.  ----------------------------------------- Stress tests: 22: exercise stress echo: EF 50%, akinesis mid anteroseptum, apical septum, apical inferior wall, and apex, and no new wall motion abnormalities post 7.1 METs of exercise.

## 2023-08-15 NOTE — HISTORY OF PRESENT ILLNESS
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 In the evening on Friday 03/18/22, he had the onset of nausea/emesis and feeling generally unwell. The symptoms persisted all night an on the subsequent morning, Saturday 03/19/22 he developed tachycardia and diaphoresis.  He was admitted to [facility] on 03/19/22 and was diagnosed with a subacute MI and cardiogenic shock.  On 03/19/22 he had an echocardiogram which revealed moderately-to-severely reduced LV function, akinesis mid and apical anterior wall, mid anteroseptum, apical septum, and apex, and mild-to-moderate MR. He was transferred to [facility] and underwent invasive coronary artery angiography on 03/19/22 which revealed LM mild, LAD prox 100%, LCX distal 80%, OM1 100%, OM2 100%, RCA mid 95%, CO 3.9 l/min, CI 1.8 l/min/m2, IABP placed, inotropes initiated. He then went on to have CABG x 3 (LIMA->LAD, SVG->OM2, SVG->RPDA) on 03/23/22.  He was initiated on guideline directed medical therapy and discharged home on 03/30/22.   On 06/07/22, he had an echocardiogram which revealed an EF of 50%, LV GLS -20.1%, and akinesis mid anteroseptum, apical septum, apical inferior wall, and apex. On 12/13/22 he had an exercise stress echocardiogram which revealed an EF of 50%, akinesis mid anteroseptum, apical septum, apical inferior wall, and apex, and no new wall motion abnormalities post 7.1 METs of exercise.  On 07/18/23, he had an echocardiogram which revealed an EF of 51%, hypokinesis mid anteroseptum, apical septum, and apical inferior wall, grade I diastolic dysfunction, and aortic sclerosis.  At present, he denies chest pain and is doing well overall.

## 2023-09-05 RX ORDER — TORSEMIDE 20 MG/1
20 TABLET ORAL
Qty: 45 | Refills: 3 | Status: ACTIVE | COMMUNITY
Start: 2022-03-31 | End: 1900-01-01

## 2023-09-14 LAB
ALBUMIN SERPL ELPH-MCNC: 4.4 G/DL
ALP BLD-CCNC: 46 U/L
ALT SERPL-CCNC: 16 U/L
ANION GAP SERPL CALC-SCNC: 13 MMOL/L
AST SERPL-CCNC: 14 U/L
BILIRUB SERPL-MCNC: 0.2 MG/DL
BUN SERPL-MCNC: 16 MG/DL
CALCIUM SERPL-MCNC: 9.7 MG/DL
CHLORIDE SERPL-SCNC: 102 MMOL/L
CHOLEST SERPL-MCNC: 94 MG/DL
CO2 SERPL-SCNC: 22 MMOL/L
CREAT SERPL-MCNC: 0.87 MG/DL
EGFR: 94 ML/MIN/1.73M2
ESTIMATED AVERAGE GLUCOSE: 154 MG/DL
GLUCOSE SERPL-MCNC: 74 MG/DL
HBA1C MFR BLD HPLC: 7 %
HDLC SERPL-MCNC: 44 MG/DL
LDLC SERPL DIRECT ASSAY-MCNC: 32 MG/DL
NT-PROBNP SERPL-MCNC: 54 PG/ML
POTASSIUM SERPL-SCNC: 4.8 MMOL/L
PROT SERPL-MCNC: 7.2 G/DL
SODIUM SERPL-SCNC: 136 MMOL/L
TRIGL SERPL-MCNC: 145 MG/DL
TSH SERPL-ACNC: 1.63 UIU/ML

## 2023-10-24 ENCOUNTER — APPOINTMENT (OUTPATIENT)
Dept: HEART AND VASCULAR | Facility: CLINIC | Age: 68
End: 2023-10-24
Payer: MEDICARE

## 2023-10-24 PROCEDURE — 99213 OFFICE O/P EST LOW 20 MIN: CPT | Mod: 95

## 2023-10-30 PROBLEM — Z13.6 SCREENING FOR AAA (AORTIC ABDOMINAL ANEURYSM): Status: RESOLVED | Noted: 2023-08-15 | Resolved: 2023-10-30

## 2023-10-31 ENCOUNTER — APPOINTMENT (OUTPATIENT)
Dept: HEART AND VASCULAR | Facility: CLINIC | Age: 68
End: 2023-10-31
Payer: MEDICARE

## 2023-10-31 PROCEDURE — 93880 EXTRACRANIAL BILAT STUDY: CPT

## 2023-10-31 PROCEDURE — 93978 VASCULAR STUDY: CPT

## 2023-11-02 ENCOUNTER — APPOINTMENT (OUTPATIENT)
Dept: HEART AND VASCULAR | Facility: CLINIC | Age: 68
End: 2023-11-02
Payer: MEDICARE

## 2023-11-02 DIAGNOSIS — Z13.6 ENCOUNTER FOR SCREENING FOR CARDIOVASCULAR DISORDERS: ICD-10-CM

## 2023-11-07 ENCOUNTER — APPOINTMENT (OUTPATIENT)
Dept: HEART AND VASCULAR | Facility: CLINIC | Age: 68
End: 2023-11-07
Payer: MEDICARE

## 2023-11-07 ENCOUNTER — APPOINTMENT (OUTPATIENT)
Dept: ENDOCRINOLOGY | Facility: CLINIC | Age: 68
End: 2023-11-07
Payer: MEDICARE

## 2023-11-07 VITALS
SYSTOLIC BLOOD PRESSURE: 103 MMHG | HEART RATE: 91 BPM | DIASTOLIC BLOOD PRESSURE: 66 MMHG | BODY MASS INDEX: 28.17 KG/M2 | WEIGHT: 202 LBS

## 2023-11-07 VITALS
HEIGHT: 71 IN | DIASTOLIC BLOOD PRESSURE: 70 MMHG | HEART RATE: 70 BPM | WEIGHT: 203 LBS | BODY MASS INDEX: 28.42 KG/M2 | SYSTOLIC BLOOD PRESSURE: 120 MMHG

## 2023-11-07 LAB — GLUCOSE BLDC GLUCOMTR-MCNC: 119

## 2023-11-07 PROCEDURE — 99214 OFFICE O/P EST MOD 30 MIN: CPT | Mod: 95

## 2023-11-07 PROCEDURE — 99214 OFFICE O/P EST MOD 30 MIN: CPT | Mod: 25

## 2023-11-07 PROCEDURE — 82962 GLUCOSE BLOOD TEST: CPT

## 2023-11-07 RX ORDER — ORAL SEMAGLUTIDE 14 MG/1
14 TABLET ORAL
Qty: 30 | Refills: 5 | Status: DISCONTINUED | COMMUNITY
Start: 2023-07-18 | End: 2023-11-07

## 2023-11-28 RX ORDER — METOPROLOL SUCCINATE 50 MG/1
50 TABLET, EXTENDED RELEASE ORAL
Qty: 90 | Refills: 3 | Status: ACTIVE | COMMUNITY
Start: 2022-03-31 | End: 1900-01-01

## 2023-11-30 RX ORDER — SEMAGLUTIDE 2.68 MG/ML
8 INJECTION, SOLUTION SUBCUTANEOUS
Qty: 3 | Refills: 5 | Status: DISCONTINUED | COMMUNITY
Start: 2022-05-03 | End: 2023-11-30

## 2023-12-06 ENCOUNTER — RX RENEWAL (OUTPATIENT)
Age: 68
End: 2023-12-06

## 2023-12-08 RX ORDER — SERTRALINE HYDROCHLORIDE 50 MG/1
50 TABLET, FILM COATED ORAL DAILY
Qty: 90 | Refills: 3 | Status: ACTIVE | COMMUNITY
Start: 2022-12-05 | End: 1900-01-01

## 2024-01-04 RX ORDER — SACUBITRIL AND VALSARTAN 49; 51 MG/1; MG/1
49-51 TABLET, FILM COATED ORAL TWICE DAILY
Qty: 60 | Refills: 6 | Status: ACTIVE | COMMUNITY
Start: 2022-03-31 | End: 1900-01-01

## 2024-02-08 LAB
25(OH)D3 SERPL-MCNC: 27.6 NG/ML
ALBUMIN SERPL ELPH-MCNC: 4.7 G/DL
ALP BLD-CCNC: 55 U/L
ALT SERPL-CCNC: 18 U/L
ANION GAP SERPL CALC-SCNC: 13 MMOL/L
AST SERPL-CCNC: 21 U/L
BILIRUB SERPL-MCNC: 0.2 MG/DL
BUN SERPL-MCNC: 17 MG/DL
CALCIUM SERPL-MCNC: 10 MG/DL
CHLORIDE SERPL-SCNC: 101 MMOL/L
CHOLEST SERPL-MCNC: 100 MG/DL
CO2 SERPL-SCNC: 26 MMOL/L
CREAT SERPL-MCNC: 0.81 MG/DL
CREAT SPEC-SCNC: 21 MG/DL
EGFR: 96 ML/MIN/1.73M2
ESTIMATED AVERAGE GLUCOSE: 140 MG/DL
GLUCOSE SERPL-MCNC: 72 MG/DL
HBA1C MFR BLD HPLC: 6.5 %
HDLC SERPL-MCNC: 46 MG/DL
LDLC SERPL CALC-MCNC: 24 MG/DL
MICROALBUMIN 24H UR DL<=1MG/L-MCNC: <1.2 MG/DL
MICROALBUMIN/CREAT 24H UR-RTO: NORMAL MG/G
NONHDLC SERPL-MCNC: 54 MG/DL
POTASSIUM SERPL-SCNC: 5 MMOL/L
PROT SERPL-MCNC: 7.3 G/DL
SODIUM SERPL-SCNC: 140 MMOL/L
TRIGL SERPL-MCNC: 187 MG/DL
VIT B12 SERPL-MCNC: 1069 PG/ML

## 2024-02-25 ENCOUNTER — RX RENEWAL (OUTPATIENT)
Age: 69
End: 2024-02-25

## 2024-02-25 RX ORDER — BLOOD-GLUCOSE SENSOR
EACH MISCELLANEOUS
Qty: 2 | Refills: 5 | Status: ACTIVE | COMMUNITY
Start: 2022-05-18 | End: 1900-01-01

## 2024-03-30 ENCOUNTER — RX RENEWAL (OUTPATIENT)
Age: 69
End: 2024-03-30

## 2024-03-30 RX ORDER — ROSUVASTATIN CALCIUM 40 MG/1
40 TABLET, FILM COATED ORAL DAILY
Qty: 90 | Refills: 3 | Status: ACTIVE | COMMUNITY
Start: 2022-06-07 | End: 1900-01-01

## 2024-04-04 NOTE — ASSESSMENT
[FreeTextEntry1] : 1. CAD: s/p MI and cardiogenic shock (03/18/22), s/p CABG x 3 (LIMA->LAD, SVG->OM2, SVG->RPDA) (03/23/22): CCS 0\par      - continue aspirin 81mg po qd\par      - continue aggressive medical management\par \par 2. Chronic systolic heart failure: HFiEF: EF 50% (06/07/22)\par     - continue Entresto 49/51mg po bid \par     - continue Toprol XL 25mg po qd (will not up titrate at this time given relative hypotension)\par     - continue Jardiance 10mg po qd (discussed the importance of proper urinary hygiene) \par     - continue spironolactone 25mg po qd (will not up titrate at this time given relative hypotension)\par     - continue torsemide 20mg po qd prn\par     - continue follow up with heart failure specialist, Brandon Briscoe MD \par     - will follow with serial echocardiograms \par \par 3. Dyslipidemia: LDL 93 (05/11/22) \par    - change atorvastatin 80mg po qd to rosuvastatin 40mg po qd to maximize CV prevention \par    - discussed therapeutic lifestyle changes to promote improved lipid metabolism \par \par 5. DM2: A1c 6.9% (05/11/22): markedly improved\par    - continue Jardiance 10mg po qd (proper urinary hygiene discussed) \par    - continue metformin 1000mg po bid\par    - continue insulin\par    - follow up with endocrinologist, HUE Parrish MD\par 
DISPLAY PLAN FREE TEXT
Female

## 2024-04-12 RX ORDER — SPIRONOLACTONE 25 MG/1
25 TABLET ORAL DAILY
Qty: 90 | Refills: 3 | Status: ACTIVE | COMMUNITY
Start: 2022-03-31 | End: 1900-01-01

## 2024-04-24 ENCOUNTER — RX RENEWAL (OUTPATIENT)
Age: 69
End: 2024-04-24

## 2024-04-26 RX ORDER — EMPAGLIFLOZIN 25 MG/1
25 TABLET, FILM COATED ORAL
Qty: 90 | Refills: 1 | Status: ACTIVE | COMMUNITY
Start: 2022-03-31 | End: 1900-01-01

## 2024-04-30 ENCOUNTER — APPOINTMENT (OUTPATIENT)
Dept: HEART AND VASCULAR | Facility: CLINIC | Age: 69
End: 2024-04-30
Payer: MEDICARE

## 2024-04-30 PROCEDURE — 99213 OFFICE O/P EST LOW 20 MIN: CPT

## 2024-04-30 NOTE — CARDIOLOGY SUMMARY
[de-identified] : \par  EKG 4/7/22: sinus tachycardia, inferior infarct pattern, nonspecific T wave abnormalities \par   [de-identified] : \par  Exercise stress echo 12/2022: LVEF 50%, no ischemia \par   [de-identified] : TTE 7/2023: LVEF 50-55% with regional WMAs , normal RV size/function, no valvular abnormalities  TTE 6/2022: LV 5.2 cm, LVEF 50% with regional WMAs worse in LAD territory, normal RV size/function, no significant valvular abnormalities   TTE 3/29/22: LV 3.7 cm, LVEF 30%, LVOT VTI 15 cm, RV not well seen, no significant valvular abnormalities  TTE 3/19/22: LVEF 20-25%  [de-identified] : \par  [facility] 3/19/22: pLAD 100% stenosis, dCX 80% stenosis with occluded OM2, 95% mRCA stenosis\par

## 2024-04-30 NOTE — DISCUSSION/SUMMARY
[FreeTextEntry1] : # Chronic systolic heart failure - GDMT: current regimen is Entresto 49-51 mg BID, metoprolol succinate 50 mg daily, spironolactone 25 mg daily, and Jardiance 25 mg daily. will continue. he is reluctant to increase doses which is reasonable given recovered LVEF - Diuretic: current regimen is torsemide 20 mg PRN, will continue  - Device: no indication given LVEF > 35%, last TTE 7/2023  - Labs: will repeat  - He is requesting a cardiac MRI but I do not see an indication or how a finding will change clinical management so will not order   # CAD s/p CABG 3/2022 - on ASA and high potency statin, as well as Repatha, last LDL 24 on 2/2024 - follows with Dr. Finkelstein   # DM2 - he is on Jardiance as well as metformin/ozempic and insulin, A1c normalized from peak of 11%   Return to clinic in 6 months via Telehealth

## 2024-04-30 NOTE — ASSESSMENT
[FreeTextEntry1] : 66 YO M with a history of ACC/AHA Stage C ICM with improved EF (LVEF 25->50%), CAD s/p 3v CABG (LIMA-LAD, SVG-OM2, SVG-PDA) 3/2022, and DM2 (A1c 6.5%) presenting to HF clinic for further management  Today he reports NYHA I symptoms and historically euvolemic on exam.

## 2024-04-30 NOTE — HISTORY OF PRESENT ILLNESS
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 He had a new transient LBBB and TTE revealed severe LV dysfunction. LHC revealed severe 3v CAD and hemodynamics revealed CI of 1.6 for which IABP was placed. He underwent 3v CABG by Dr. Abad and did well post-operatively. with revascularization and GDMT his LVEF has improved to 50% and he has had NYHA 1 symptoms with normalized BNP.  He presents today for followup via telehealth. He feels well and is asymptomatic. Denies any chest pain or chest pressure. Denies any dyspnea on exertion. Denies orthopnea or edema. Blood pressure at home is typically 110s/70s and weights have been stable. He is asking about obtaining a cardiac MRI.  [Home] : at home, [unfilled] , at the time of the visit. [Medical Office: ([facility])___] : at the medical office located in  [Verbal consent obtained from patient] : the patient, [unfilled]

## 2024-05-04 NOTE — PROGRESS NOTE ADULT - SUBJECTIVE AND OBJECTIVE BOX
Patient seen and examined at bedside.    Overnight Events: no events, issues or complaints    Review of Systems:  REVIEW OF SYSTEMS:  CONSTITUTIONAL: No weakness, fevers or chills  EYES/ENT: No visual changes;  No dysphagia  NECK: No pain or stiffness  RESPIRATORY: No cough, wheezing, hemoptysis; No shortness of breath  CARDIOVASCULAR: No chest pain or palpitations; No lower extremity edema  GASTROINTESTINAL: No abdominal or epigastric pain. No nausea, vomiting, or hematemesis; No diarrhea or constipation. No melena or hematochezia.  BACK: No back pain  GENITOURINARY: No dysuria, frequency or hematuria  NEUROLOGICAL: No numbness or weakness  SKIN: No itching, burning, rashes, or lesions   All other review of systems is negative unless indicated above.    [x] All other systems negative  [ ] Unable to assess ROS due to    Current Meds:  acetaminophen     Tablet .. 650 milliGRAM(s) Oral every 6 hours PRN  aspirin enteric coated 81 milliGRAM(s) Oral daily  atorvastatin 80 milliGRAM(s) Oral at bedtime  bisacodyl Suppository 10 milliGRAM(s) Rectal once  dextrose 40% Gel 15 Gram(s) Oral once  dextrose 50% Injectable 50 milliLiter(s) IV Push every 15 minutes  enoxaparin Injectable 40 milliGRAM(s) SubCutaneous every 24 hours  glucagon  Injectable 1 milliGRAM(s) IntraMuscular once  insulin glargine Injectable (LANTUS) 28 Unit(s) SubCutaneous at bedtime  insulin lispro (ADMELOG) corrective regimen sliding scale   SubCutaneous three times a day before meals  insulin lispro (ADMELOG) corrective regimen sliding scale   SubCutaneous at bedtime  insulin lispro Injectable (ADMELOG) 8 Unit(s) SubCutaneous three times a day before meals  metoprolol succinate ER 25 milliGRAM(s) Oral daily  oxyCODONE    IR 5 milliGRAM(s) Oral every 4 hours PRN  oxyCODONE    IR 10 milliGRAM(s) Oral every 4 hours PRN  pantoprazole    Tablet 40 milliGRAM(s) Oral before breakfast  polyethylene glycol 3350 17 Gram(s) Oral daily  sacubitril 24 mG/valsartan 26 mG 1 Tablet(s) Oral two times a day  senna 2 Tablet(s) Oral at bedtime  sodium chloride 0.9% lock flush 3 milliLiter(s) IV Push every 8 hours      PAST MEDICAL & SURGICAL HISTORY:  No pertinent past medical history    No significant past surgical history        Vitals:  T(F): 97.9 (03-29), Max: 98.3 (03-29)  HR: 92 (03-29) (92 - 105)  BP: 114/74 (03-29) (102/66 - 114/74)  RR: 18 (03-29)  SpO2: 95% (03-29)  I&O's Summary    28 Mar 2022 07:01  -  29 Mar 2022 07:00  --------------------------------------------------------  IN: 360 mL / OUT: 1930 mL / NET: -1570 mL    29 Mar 2022 07:01  -  29 Mar 2022 13:31  --------------------------------------------------------  IN: 360 mL / OUT: 200 mL / NET: 160 mL        Physical Exam:  Appearance: No acute distress; well appearing  Eyes: PERRL, EOMI, pink conjunctiva  HENT: Normal oral mucosa  Cardiovascular: RRR, S1, S2, no murmurs, rubs, or gallops; no edema; no JVD  Respiratory: Clear to auscultation bilaterally  Gastrointestinal: soft, non-tender, non-distended with normal bowel sounds  Musculoskeletal: No clubbing; no joint deformity   Neurologic: Non-focal  Lymphatic: No lymphadenopathy  Psychiatry: AAOx3, mood & affect appropriate  Skin: No rashes, ecchymoses, or cyanosis; central chest incision site c/d/i                          11.1   18.25 )-----------( 535      ( 29 Mar 2022 05:32 )             34.7     03-29    136  |  96  |  20  ----------------------------<  118<H>  3.8   |  28  |  0.80    Ca    9.3      29 Mar 2022 05:32  Phos  4.0     03-28  Mg     2.2     03-28    TPro  6.4  /  Alb  3.8  /  TBili  0.7  /  DBili  x   /  AST  18  /  ALT  33  /  AlkPhos  98  03-28                     04-May-2024 19:06

## 2024-05-07 ENCOUNTER — APPOINTMENT (OUTPATIENT)
Dept: ENDOCRINOLOGY | Facility: CLINIC | Age: 69
End: 2024-05-07
Payer: MEDICARE

## 2024-05-07 VITALS
BODY MASS INDEX: 28.45 KG/M2 | HEART RATE: 89 BPM | WEIGHT: 204 LBS | SYSTOLIC BLOOD PRESSURE: 107 MMHG | DIASTOLIC BLOOD PRESSURE: 72 MMHG

## 2024-05-07 LAB
GLUCOSE BLDC GLUCOMTR-MCNC: 87
HBA1C MFR BLD HPLC: 6.3

## 2024-05-07 PROCEDURE — G2211 COMPLEX E/M VISIT ADD ON: CPT

## 2024-05-07 PROCEDURE — 82962 GLUCOSE BLOOD TEST: CPT

## 2024-05-07 PROCEDURE — 99214 OFFICE O/P EST MOD 30 MIN: CPT

## 2024-05-07 PROCEDURE — 83036 HEMOGLOBIN GLYCOSYLATED A1C: CPT | Mod: QW

## 2024-05-07 RX ORDER — METFORMIN HYDROCHLORIDE 1000 MG/1
1000 TABLET, COATED ORAL TWICE DAILY
Qty: 180 | Refills: 1 | Status: ACTIVE | COMMUNITY
Start: 2022-07-31 | End: 1900-01-01

## 2024-05-07 RX ORDER — TIRZEPATIDE 15 MG/.5ML
15 INJECTION, SOLUTION SUBCUTANEOUS
Qty: 4 | Refills: 5 | Status: ACTIVE | COMMUNITY
Start: 2023-11-07 | End: 1900-01-01

## 2024-05-08 ENCOUNTER — RX RENEWAL (OUTPATIENT)
Age: 69
End: 2024-05-08

## 2024-05-08 RX ORDER — EVOLOCUMAB 140 MG/ML
140 INJECTION, SOLUTION SUBCUTANEOUS
Qty: 6 | Refills: 3 | Status: ACTIVE | COMMUNITY
Start: 2022-09-14 | End: 1900-01-01

## 2024-05-08 NOTE — DATA REVIEWED
[FreeTextEntry1] : 2/24  A1c 6.5%, tot chol 100, trig 187, HDL 46, LDL 24, urine alb < 1.2, 25D 27.6, GFR 96 9/23 aa 7.0%, tot chol 94, trig 145, HDL 44, LDL 32, TSH 1.63 5/23  A1c 6.4% 4/23  A1c 6.3%, tot chol 96, trig 122, HDL 50, LDL 25, tSH 1.99 2/23  A1c 6.3%, urine alb < 1.2 12/22  A1c 6.4% 9/22  A1c 6.6%, tot chol 170, trig 183, HDL 44, LDL 89, TSH 1.23, B12 356 8/22 A1c 6.7% 3/22: A1c 11.4%

## 2024-05-08 NOTE — HISTORY OF PRESENT ILLNESS
[FreeTextEntry1] : here with wife Bjorn CGM report  reviewed: 14d average 141, 89% in range,11% high, 97% CGM use  (Paying out of pocket) Daily graph showing sugars mostly in range, highest sugar is usually after breakfast. He changed watermelon in morning to berries.  He prefers to go back to Mounjaro, which worked better for lowering sugars and reducing his appetite. He also wants to titrate metformin to 1000mg. He has been walking more. he still has not seen ophtho  labs reviewed from 2/24  A1c 6.5%, LDL 24, urine alb < 1.2    PMH:  CAD s/p MI, CABG, 3/2022 diabetes   Meds: metformin 850mg bid, Jardiance 25mg, Ozempic 2mg/week,  repaglinide 1mg prn heavy meals (taking a few times per week) rosuvastatin 40mg,  aspirin 81mg, Repatha every 2 weeks Entresto, metoprolol ER 25mg, spironolactone 25mg torsemide pantoprazole

## 2024-05-08 NOTE — PHYSICAL EXAM
[Alert] : alert [No Acute Distress] : no acute distress [No Proptosis] : no proptosis [No Lid Lag] : no lid lag [Normal Hearing] : hearing was normal [No LAD] : no lymphadenopathy [Thyroid Not Enlarged] : the thyroid was not enlarged [Clear to Auscultation] : lungs were clear to auscultation bilaterally [Normal S1, S2] : normal S1 and S2 [Regular Rhythm] : with a regular rhythm [No Edema] : no peripheral edema [Normal Affect] : the affect was normal [Normal Mood] : the mood was normal [Acanthosis Nigricans] : no acanthosis nigricans [Foot Ulcers] : no foot ulcers [de-identified] : foot exam deferred, pt says his feet are fine

## 2024-05-08 NOTE — ASSESSMENT
[FreeTextEntry1] : Diabetes, at goal.   CAD. Try changing back to Mounjaro 15mg/week (if available), otherwise continue Ozempic 2mg/week. Continue Jardiance 25mg,   titrate metformin  to 1000mg bid.  Continue repaglinide for when he eats out; and larger meals. Continue regular exercise. continue statin therapy ophtho recommended  RTO 6 months

## 2024-05-21 RX ORDER — REPAGLINIDE 1 MG/1
1 TABLET ORAL
Qty: 60 | Refills: 5 | Status: ACTIVE | COMMUNITY
Start: 2022-11-01 | End: 1900-01-01

## 2024-06-25 NOTE — PATIENT PROFILE ADULT - FUNCTIONAL ASSESSMENT - DAILY ACTIVITY 2.
2 = A lot of assistance Continue Regimen: Take 1 tab of Isotretinoin 40mg po bid with a healthy fatty meal Detail Level: Zone Render In Strict Bullet Format?: No Plan: Recommend wearing & reapplying sunscreen qd

## 2024-06-28 PROBLEM — I10 HTN (HYPERTENSION): Status: ACTIVE | Noted: 2022-04-06

## 2024-06-28 PROBLEM — I21.9 MYOCARDIAL INFARCTION: Status: ACTIVE | Noted: 2022-04-06

## 2024-06-28 PROBLEM — E11.9 DIABETES MELLITUS: Status: ACTIVE | Noted: 2022-04-06

## 2024-06-28 PROBLEM — I50.22 CHRONIC SYSTOLIC HEART FAILURE: Status: ACTIVE | Noted: 2022-04-06

## 2024-06-28 PROBLEM — I65.29 CAROTID ARTERY STENOSIS: Status: ACTIVE | Noted: 2023-08-15

## 2024-06-28 PROBLEM — Z95.1 S/P CABG X 3: Status: ACTIVE | Noted: 2022-04-07

## 2024-06-28 PROBLEM — E78.5 DYSLIPIDEMIA: Status: ACTIVE | Noted: 2022-04-06

## 2024-06-28 PROBLEM — I25.10 CAD (CORONARY ARTERY DISEASE): Status: ACTIVE | Noted: 2022-04-06

## 2024-06-29 LAB
ALBUMIN SERPL ELPH-MCNC: 4.4 G/DL
ALP BLD-CCNC: 48 U/L
ALT SERPL-CCNC: 10 U/L
ANION GAP SERPL CALC-SCNC: 14 MMOL/L
AST SERPL-CCNC: 12 U/L
BILIRUB SERPL-MCNC: 0.2 MG/DL
BUN SERPL-MCNC: 17 MG/DL
CALCIUM SERPL-MCNC: 10 MG/DL
CHLORIDE SERPL-SCNC: 102 MMOL/L
CHOLEST SERPL-MCNC: 91 MG/DL
CO2 SERPL-SCNC: 22 MMOL/L
CREAT SERPL-MCNC: 0.92 MG/DL
EGFR: 90 ML/MIN/1.73M2
GLUCOSE SERPL-MCNC: 98 MG/DL
HCT VFR BLD CALC: 46.5 %
HDLC SERPL-MCNC: 40 MG/DL
HGB BLD-MCNC: 14.7 G/DL
LDLC SERPL CALC-MCNC: 25 MG/DL
MCHC RBC-ENTMCNC: 29.4 PG
MCHC RBC-ENTMCNC: 31.6 GM/DL
MCV RBC AUTO: 93 FL
NONHDLC SERPL-MCNC: 52 MG/DL
NT-PROBNP SERPL-MCNC: 50 PG/ML
PLATELET # BLD AUTO: 178 K/UL
POTASSIUM SERPL-SCNC: 5.2 MMOL/L
PROT SERPL-MCNC: 6.9 G/DL
RBC # BLD: 5 M/UL
RBC # FLD: 14.1 %
SODIUM SERPL-SCNC: 139 MMOL/L
TRIGL SERPL-MCNC: 162 MG/DL
WBC # FLD AUTO: 11.85 K/UL

## 2024-07-02 ENCOUNTER — APPOINTMENT (OUTPATIENT)
Dept: HEART AND VASCULAR | Facility: CLINIC | Age: 69
End: 2024-07-02

## 2024-07-02 DIAGNOSIS — E78.5 HYPERLIPIDEMIA, UNSPECIFIED: ICD-10-CM

## 2024-07-02 DIAGNOSIS — Z95.1 PRESENCE OF AORTOCORONARY BYPASS GRAFT: ICD-10-CM

## 2024-07-02 DIAGNOSIS — E11.9 TYPE 2 DIABETES MELLITUS W/OUT COMPLICATIONS: ICD-10-CM

## 2024-07-02 DIAGNOSIS — I21.9 ACUTE MYOCARDIAL INFARCTION, UNSPECIFIED: ICD-10-CM

## 2024-07-02 DIAGNOSIS — I65.29 OCCLUSION AND STENOSIS OF UNSPECIFIED CAROTID ARTERY: ICD-10-CM

## 2024-07-02 DIAGNOSIS — I50.22 CHRONIC SYSTOLIC (CONGESTIVE) HEART FAILURE: ICD-10-CM

## 2024-07-02 DIAGNOSIS — I10 ESSENTIAL (PRIMARY) HYPERTENSION: ICD-10-CM

## 2024-07-02 DIAGNOSIS — I25.10 ATHEROSCLEROTIC HEART DISEASE OF NATIVE CORONARY ARTERY W/OUT ANGINA PECTORIS: ICD-10-CM

## 2024-07-15 ENCOUNTER — RX RENEWAL (OUTPATIENT)
Age: 69
End: 2024-07-15

## 2024-07-15 RX ORDER — METFORMIN HYDROCHLORIDE 850 MG/1
850 TABLET, COATED ORAL
Qty: 180 | Refills: 3 | Status: ACTIVE | COMMUNITY
Start: 2024-07-15 | End: 1900-01-01

## 2024-07-16 RX ORDER — SEMAGLUTIDE 2.68 MG/ML
8 INJECTION, SOLUTION SUBCUTANEOUS
Qty: 1 | Refills: 1 | Status: ACTIVE | COMMUNITY
Start: 2024-07-16 | End: 1900-01-01

## 2024-08-17 ENCOUNTER — APPOINTMENT (OUTPATIENT)
Dept: HEART AND VASCULAR | Facility: CLINIC | Age: 69
End: 2024-08-17
Payer: MEDICARE

## 2024-08-17 PROCEDURE — 93306 TTE W/DOPPLER COMPLETE: CPT

## 2024-08-20 ENCOUNTER — NON-APPOINTMENT (OUTPATIENT)
Age: 69
End: 2024-08-20

## 2024-08-20 ENCOUNTER — LABORATORY RESULT (OUTPATIENT)
Age: 69
End: 2024-08-20

## 2024-08-20 ENCOUNTER — APPOINTMENT (OUTPATIENT)
Dept: HEART AND VASCULAR | Facility: CLINIC | Age: 69
End: 2024-08-20
Payer: MEDICARE

## 2024-08-20 VITALS
BODY MASS INDEX: 2.8 KG/M2 | HEART RATE: 81 BPM | HEIGHT: 71 IN | OXYGEN SATURATION: 96 % | WEIGHT: 20 LBS | TEMPERATURE: 98 F | SYSTOLIC BLOOD PRESSURE: 111 MMHG | DIASTOLIC BLOOD PRESSURE: 68 MMHG

## 2024-08-20 DIAGNOSIS — I25.10 ATHEROSCLEROTIC HEART DISEASE OF NATIVE CORONARY ARTERY W/OUT ANGINA PECTORIS: ICD-10-CM

## 2024-08-20 DIAGNOSIS — Z95.1 PRESENCE OF AORTOCORONARY BYPASS GRAFT: ICD-10-CM

## 2024-08-20 DIAGNOSIS — I21.9 ACUTE MYOCARDIAL INFARCTION, UNSPECIFIED: ICD-10-CM

## 2024-08-20 DIAGNOSIS — I50.22 CHRONIC SYSTOLIC (CONGESTIVE) HEART FAILURE: ICD-10-CM

## 2024-08-20 DIAGNOSIS — I10 ESSENTIAL (PRIMARY) HYPERTENSION: ICD-10-CM

## 2024-08-20 DIAGNOSIS — E78.5 HYPERLIPIDEMIA, UNSPECIFIED: ICD-10-CM

## 2024-08-20 DIAGNOSIS — E11.9 TYPE 2 DIABETES MELLITUS W/OUT COMPLICATIONS: ICD-10-CM

## 2024-08-20 DIAGNOSIS — I65.29 OCCLUSION AND STENOSIS OF UNSPECIFIED CAROTID ARTERY: ICD-10-CM

## 2024-08-20 PROCEDURE — 93000 ELECTROCARDIOGRAM COMPLETE: CPT

## 2024-08-20 PROCEDURE — 99215 OFFICE O/P EST HI 40 MIN: CPT

## 2024-08-20 PROCEDURE — G2211 COMPLEX E/M VISIT ADD ON: CPT

## 2024-08-20 RX ORDER — SERTRALINE 25 MG/1
25 TABLET, FILM COATED ORAL
Qty: 90 | Refills: 3 | Status: ACTIVE | COMMUNITY
Start: 2024-08-20 | End: 1900-01-01

## 2024-08-20 RX ORDER — ASPIRIN ENTERIC COATED TABLETS 81 MG 81 MG/1
81 TABLET, DELAYED RELEASE ORAL DAILY
Qty: 90 | Refills: 3 | Status: ACTIVE | COMMUNITY
Start: 2024-08-20 | End: 1900-01-01

## 2024-08-20 NOTE — HISTORY OF PRESENT ILLNESS
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 In the evening on Friday 03/18/22, he had the onset of nausea/emesis and feeling generally unwell. The symptoms persisted all night an on the subsequent morning, Saturday 03/19/22 he developed tachycardia and diaphoresis.  He was admitted to [facility] on 03/19/22 and was diagnosed with a subacute MI and cardiogenic shock.  On 03/19/22 he had an echocardiogram which revealed moderately-to-severely reduced LV function, akinesis mid and apical anterior wall, mid anteroseptum, apical septum, and apex, and mild-to-moderate MR. He was transferred to [facility] and underwent invasive coronary artery angiography on 03/19/22 which revealed LM mild, LAD prox 100%, LCX distal 80%, OM1 100%, OM2 100%, RCA mid 95%, CO 3.9 l/min, CI 1.8 l/min/m2, IABP placed, inotropes initiated. He then went on to have CABG x 3 (LIMA->LAD, SVG->OM2, SVG->RPDA) on 03/23/22.  He was initiated on guideline directed medical therapy and discharged home on 03/30/22.   On 08/17/24, he had an echocardiogram which revealed an EF of 53% and akinesis of the apical septum, apical inferior wall, and apex.  At present, he denies chest pain and is feeling well overall.

## 2024-08-20 NOTE — PHYSICAL EXAM
[de-identified] : + median sternotomy scar well healed  [de-identified] : 2+ LE edema (l > r), s/p b/l GSVT harvest scars

## 2024-08-20 NOTE — ASSESSMENT
[FreeTextEntry1] : ================================================================= 1. CAD: s/p MI and cardiogenic shock (03/18/22), s/p CABG x 3 (LIMA->LAD, SVG->OM2, SVG->RPDA) (03/23/22): CCS 0:     - continue single antiplatelet therapy with aspirin 81mg po qd     - continue aggressive medical management      2. Chronic systolic heart failure: HFiEF: EF 51% (07/18/23):   - beta blocker: continue metoprolol succinate 50mg po qd    - ACEi / ARB / ARNI: continue Entresto 49/51mg po bid (will not up titrate given borderline hypotension)   - MRA: continue spironolactone 25mg po qd   - [id number]: continue Jardiance 10mg po qd (discussed the importance of proper urinary hygiene)   - loop diuretics: continue torsemide 20mg po qd prn   - heart failure implanted cardiac devices: no indication for ICD/CRT-D/CardioMEMS   - will follow left ventricular function with periodic echocardiograms   - continue follow up with heart failure specialist, Brandon Briscoe MD  3. Dyslipidemia: LDL24 (02/07/24):      - continue rosuvastatin 40mg po qd to maximize CV prevention     - continue Repatha 140mg sc q 2 weeks     - discussed therapeutic lifestyle changes to promote improved lipid metabolism     - check lab work today  5. DM2: A1c 6.3% (05/07/24):      - continue Jardiance 25mg po qd (proper urinary hygiene discussed)     - continue metformin 1000mg po bid     - continue Mounjaro 0.5mg sc q week     - continue repaglinide 1mg po qd      - continue insulin     - check lab work today  6. Research: enrolled in the GLAS Registry (12/13/22): multi-center cross-sectional epidemiological study to estimate the prevalence (%) of patients with elevated Lp(a) in patients with established CVD  I spent > 40 minutes of total time on this visit, including time spent face-to-face and non-face-to-face.  During this time, I took a relevant history and examined the patient.  I reviewed relevant portions of the medical record and formulated a differential diagnosis and plan.  I explained the relevant cardiac diagnoses to the patient, as well as the work up and management plan.  I answered all questions related to the patient's cardiac conditions.

## 2024-08-20 NOTE — REASON FOR VISIT
[FreeTextEntry1] : ======================================================================================= Diagnostic Tests: -------------------------------------------------------------- EC24: sinus rhythm, old anterior and inferior MI.  23: sinus rhythm, old anterior and inferior MI.   10/14/22: sinus rhythm, old anterior MI.  22: sinus tachycardia at 112 bpm, SUNDEEP, recent inferior MI, recent anteroseptal MI.   -------------------------------------------------------------- Echo: 24: EF 53%, akinesis apical septum, apical inferior wall, and apex.  23: EF 51%, hypokinesis mid anteroseptum, apical septum, and apical inferior wall, grade I diastolic dysfunction, aortic sclerosis. 22: EF 50%, LV GLS -20.1%, akinesis mid anteroseptum, apical septum, apical inferior wall, and apex.  22: technically difficult, moderately-to-severely reduced LV function, akinesis septum, inferior wall, and apex. 22: technically difficult, moderately-to-severely reduced LV function, akinesis mid and apical anterior wall, mid anteroseptum, apical septum, and apex, mild-to-moderate MR.  -------------------------------------------------------------- Carotid arteries: 10/31/23: sono: b/l mild ICA atherosclerosis.  22: sono: unilateral left: no hemodynamically significant stenosis.  -------------------------------------------------------------- Cath: 22: LM mild, LAD prox 100%, LCX distal 80%, OM1 100%, OM2 100%, RCA mid 95%, CO 3.9 l/min, CI 1.8 l/min/m2, IABP placed, inotropes initiated.  -------------------------------------------------------------- Stress tests: 22: exercise stress echo: EF 50%, akinesis mid anteroseptum, apical septum, apical inferior wall, and apex, and no new wall motion abnormalities post 7.1 METs of exercise.  -------------------------------------------------------------- Abdominal Aorta - Iliac Arteries:  10/31/23: sono: no AAA, normal study.

## 2024-08-21 LAB
24R-OH-CALCIDIOL SERPL-MCNC: 67.9 PG/ML
ALBUMIN SERPL ELPH-MCNC: 4.6 G/DL
ALP BLD-CCNC: 55 U/L
ALT SERPL-CCNC: 14 U/L
ANION GAP SERPL CALC-SCNC: 19 MMOL/L
AST SERPL-CCNC: 15 U/L
BILIRUB SERPL-MCNC: 0.2 MG/DL
BUN SERPL-MCNC: 17 MG/DL
CALCIUM SERPL-MCNC: 9.2 MG/DL
CHLORIDE SERPL-SCNC: 101 MMOL/L
CHOLEST SERPL-MCNC: 107 MG/DL
CO2 SERPL-SCNC: 18 MMOL/L
CREAT SERPL-MCNC: 0.77 MG/DL
EGFR: 97 ML/MIN/1.73M2
ESTIMATED AVERAGE GLUCOSE: 143 MG/DL
GLUCOSE SERPL-MCNC: 121 MG/DL
HBA1C MFR BLD HPLC: 6.6 %
HDLC SERPL-MCNC: 40 MG/DL
LDLC SERPL DIRECT ASSAY-MCNC: 35 MG/DL
NT-PROBNP SERPL-MCNC: 73 PG/ML
POTASSIUM SERPL-SCNC: 4.4 MMOL/L
PROT SERPL-MCNC: 7.1 G/DL
SODIUM SERPL-SCNC: 138 MMOL/L
TRIGL SERPL-MCNC: 289 MG/DL

## 2024-08-22 LAB
BASOPHILS # BLD AUTO: 0.07 K/UL
BASOPHILS NFR BLD AUTO: 0.6 %
EOSINOPHIL # BLD AUTO: 0.08 K/UL
EOSINOPHIL NFR BLD AUTO: 0.7 %
HCT VFR BLD CALC: 47 %
HGB BLD-MCNC: 15 G/DL
IMM GRANULOCYTES NFR BLD AUTO: 0.3 %
LYMPHOCYTES # BLD AUTO: 3.98 K/UL
LYMPHOCYTES NFR BLD AUTO: 33.8 %
MAN DIFF?: NORMAL
MCHC RBC-ENTMCNC: 29.7 PG
MCHC RBC-ENTMCNC: 31.9 GM/DL
MCV RBC AUTO: 93.1 FL
MONOCYTES # BLD AUTO: 0.72 K/UL
MONOCYTES NFR BLD AUTO: 6.1 %
NEUTROPHILS # BLD AUTO: 6.87 K/UL
NEUTROPHILS NFR BLD AUTO: 58.5 %
PLATELET # BLD AUTO: NORMAL K/UL
RBC # BLD: 5.05 M/UL
RBC # FLD: 14.6 %
TSH SERPL-ACNC: 1.36 UIU/ML
WBC # FLD AUTO: 11.76 K/UL

## 2024-08-23 RX ORDER — TIRZEPATIDE 12.5 MG/.5ML
12.5 INJECTION, SOLUTION SUBCUTANEOUS
Qty: 4 | Refills: 5 | Status: ACTIVE | COMMUNITY
Start: 2024-08-23 | End: 1900-01-01

## 2024-11-05 ENCOUNTER — APPOINTMENT (OUTPATIENT)
Dept: HEART FAILURE | Facility: CLINIC | Age: 69
End: 2024-11-05
Payer: MEDICARE

## 2024-11-05 PROCEDURE — 99213 OFFICE O/P EST LOW 20 MIN: CPT

## 2024-11-12 ENCOUNTER — APPOINTMENT (OUTPATIENT)
Dept: ENDOCRINOLOGY | Facility: CLINIC | Age: 69
End: 2024-11-12
Payer: MEDICARE

## 2024-11-12 VITALS
SYSTOLIC BLOOD PRESSURE: 101 MMHG | WEIGHT: 204 LBS | BODY MASS INDEX: 28.56 KG/M2 | HEIGHT: 71 IN | DIASTOLIC BLOOD PRESSURE: 66 MMHG | HEART RATE: 84 BPM

## 2024-11-12 LAB
GLUCOSE BLDC GLUCOMTR-MCNC: 100
GLUCOSE BLDC GLUCOMTR-MCNC: 61
GLUCOSE BLDC GLUCOMTR-MCNC: 61
HBA1C MFR BLD HPLC: 6.3

## 2024-11-12 PROCEDURE — 99214 OFFICE O/P EST MOD 30 MIN: CPT

## 2024-11-12 PROCEDURE — 82962 GLUCOSE BLOOD TEST: CPT

## 2024-11-12 PROCEDURE — G2211 COMPLEX E/M VISIT ADD ON: CPT

## 2024-11-12 PROCEDURE — 83036 HEMOGLOBIN GLYCOSYLATED A1C: CPT | Mod: QW

## 2024-11-12 RX ORDER — TIRZEPATIDE 15 MG/.5ML
15 INJECTION, SOLUTION SUBCUTANEOUS
Qty: 12 | Refills: 1 | Status: ACTIVE | COMMUNITY
Start: 2024-11-12 | End: 1900-01-01

## 2024-12-17 ENCOUNTER — APPOINTMENT (OUTPATIENT)
Dept: HEART AND VASCULAR | Facility: CLINIC | Age: 69
End: 2024-12-17

## 2024-12-17 NOTE — ED ADULT NURSE NOTE - BOWEL SOUNDS LLQ
Hide Additional Notes?: No Detail Level: Generalized Detail Level: Zone Include Location In Plan?: Yes present

## 2025-02-12 NOTE — ED ADULT NURSE NOTE - CARDIO ASSESSMENT
Patient to continue with weight bearing exercises as much as possible and oral intake of 1200 mg of calcium with 800 IU of Vit D to maximize bone protection. Pt  to continue  with DEXA scan every 2 years to reassess. All qustions regarding this problem answered today to patient satisfaction.       ---

## 2025-04-14 ENCOUNTER — RX RENEWAL (OUTPATIENT)
Age: 70
End: 2025-04-14

## 2025-05-20 ENCOUNTER — APPOINTMENT (OUTPATIENT)
Dept: ENDOCRINOLOGY | Facility: CLINIC | Age: 70
End: 2025-05-20

## 2025-06-05 ENCOUNTER — RX RENEWAL (OUTPATIENT)
Age: 70
End: 2025-06-05

## 2025-06-06 ENCOUNTER — TRANSCRIPTION ENCOUNTER (OUTPATIENT)
Age: 70
End: 2025-06-06

## 2025-06-18 ENCOUNTER — RX RENEWAL (OUTPATIENT)
Age: 70
End: 2025-06-18

## 2025-06-28 ENCOUNTER — RX RENEWAL (OUTPATIENT)
Age: 70
End: 2025-06-28

## 2025-09-03 ENCOUNTER — RX RENEWAL (OUTPATIENT)
Age: 70
End: 2025-09-03

## (undated) DEVICE — DRSG OPSITE 2.5 X 2"

## (undated) DEVICE — SUCTION YANKAUER BULBOUS TIP NO VENT

## (undated) DEVICE — SUT PROLENE 8-0 24" BV175-6

## (undated) DEVICE — TOURNIQUET SET 12FR (1 RED, 1 BLUE, 1 SNARE) 7"

## (undated) DEVICE — WARMING BLANKET FULL ADULT

## (undated) DEVICE — MEDTRONIC CLEARVIEW BLOWER MISTER KIT W TUBING SET

## (undated) DEVICE — GLV 8 PROTEXIS (WHITE)

## (undated) DEVICE — SUT SILK 5-0 60" TIES

## (undated) DEVICE — VESSEL LOOP KEY SURG MINI RED 2.4X1.2MM

## (undated) DEVICE — GLV 6.5 PROTEXIS (WHITE)

## (undated) DEVICE — DRSG OPSITE 13.75 X 4"

## (undated) DEVICE — Device

## (undated) DEVICE — SPECIMEN CONTAINER 100ML

## (undated) DEVICE — SYR LUER LOK 3CC

## (undated) DEVICE — SUT SILK 2-0 18" SH (POP-OFF)

## (undated) DEVICE — DRAIN PLEUROVAC

## (undated) DEVICE — SUT PROLENE 7-0 24" BV-1

## (undated) DEVICE — VESSEL LOOP MAXI-RED  0.120" X 16"

## (undated) DEVICE — TUBING KIT FAST START ATF 40

## (undated) DEVICE — CANISTER SUCTION 2000CC

## (undated) DEVICE — NDL COUNTER FOAM AND MAGNET 40-70

## (undated) DEVICE — GETINGE VASOVIEW 7 ENDOSCOPIC VESSEL HARVESTING SYSTEM

## (undated) DEVICE — SOL IRR POUR NS 0.9% 500ML

## (undated) DEVICE — BLADE SCALPEL SAFETYLOCK #15

## (undated) DEVICE — SOL IRR POUR H2O 250ML

## (undated) DEVICE — SYR LUER LOK 30CC

## (undated) DEVICE — DRAPE TOWEL BLUE 17" X 24"

## (undated) DEVICE — SUT VICRYL 0 36" CTX UNDYED

## (undated) DEVICE — SUT PLEDGET SOFT LARGE 3/8" X 3/16" X 1/16" X6

## (undated) DEVICE — PACK UNIVERSAL CARDIAC

## (undated) DEVICE — DRAIN RESERVOIR FOR JACKSON PRATT 100CC CARDINAL

## (undated) DEVICE — POSITIONER CARDIAC BUMP

## (undated) DEVICE — DRAIN CHANNEL 32FR ROUND HUBLESS FULL FLUTED

## (undated) DEVICE — SYS VEIN HARVESTING VIRTUOSAPH PLUS W/ RADIAL

## (undated) DEVICE — SOL NORMOSOL-R PH7.4 1000ML

## (undated) DEVICE — BLADE SCALPEL SAFETYLOCK #10

## (undated) DEVICE — PACK CUSTOM W/INSPIRE OXYGENATOR

## (undated) DEVICE — DRAPE 3/4 SHEET W REINFORCEMENT 56X77"

## (undated) DEVICE — DRAPE 1/2 SHEET 40X57"

## (undated) DEVICE — GLV 7 PROTEXIS (WHITE)

## (undated) DEVICE — SOL IRR BAG NS 0.9% 3000ML

## (undated) DEVICE — SUT PROLENE 4-0 30" SH-1

## (undated) DEVICE — GOWN SLEEVES

## (undated) DEVICE — SUT DOUBLE 6 WIRE STERNAL

## (undated) DEVICE — SUT VICRYL 3-0 27" CT-1

## (undated) DEVICE — DRAIN DRAINGE CHEST DRY ADL DUAL

## (undated) DEVICE — NDL HYPO SAFE 18G X 1.5" (PINK)

## (undated) DEVICE — ELCTR BOVIE TIP CLEANER SCRATCH PAD

## (undated) DEVICE — SUT ETHIBOND 1 30" CT-1

## (undated) DEVICE — PACING CABLE (BROWN) A/V TEMP SCREW DOWN 12FT

## (undated) DEVICE — TISSUE STABILIZER MEDTRONIC OCTOPUS EVOLUTION

## (undated) DEVICE — DRAPE IOBAN 33" X 23"

## (undated) DEVICE — VENTING ADAPTER "Y" (RED/BLUE) 7.5"

## (undated) DEVICE — CONNECTOR CARDIAC 1:1 FOR HUBLESS DRAINS

## (undated) DEVICE — DRSG ACE BANDAGE 6"

## (undated) DEVICE — SYR LUER LOK 50CC

## (undated) DEVICE — BLADE SCALPEL SAFETYLOCK #11

## (undated) DEVICE — SUT PROLENE 6-0 30" C-1

## (undated) DEVICE — SUMP PERICARDIAL 20FR 1/4" ADULT

## (undated) DEVICE — SYR LUER LOK 1CC

## (undated) DEVICE — DRSG KLING 6"

## (undated) DEVICE — GLV 7.5 PROTEXIS (WHITE)

## (undated) DEVICE — FEEDING TUBE NG SUMP 16FR 48"

## (undated) DEVICE — PREP CHLORAPREP HI-LITE ORANGE 26ML

## (undated) DEVICE — SUT VICRYL 1 36" CTX UNDYED

## (undated) DEVICE — AORTIC PUNCH 4.0MM LONG LENGTH HANDLE

## (undated) DEVICE — GLV 8.5 PROTEXIS (WHITE)

## (undated) DEVICE — DRAPE MAYO STAND 30"

## (undated) DEVICE — MEDTRONIC URCHIN EVO HEART POSITIONER & CANISTER TUBING SET

## (undated) DEVICE — CONNECTOR "Y" 3/8 X 1/4 X 3/8"

## (undated) DEVICE — STABILIZER HAND ASSISTANT ATTACHMENT W STABLESOFT 2S

## (undated) DEVICE — GOWN LG

## (undated) DEVICE — GOWN TRIMAX LG

## (undated) DEVICE — LAP PAD 18 X 18"

## (undated) DEVICE — SYR LUER LOK 20CC

## (undated) DEVICE — STOPCOCK 4-WAY (BLUE) DISCOFIX SPIN-LOCK CONNECTOR

## (undated) DEVICE — SUT SOFSILK 0 18" TIES

## (undated) DEVICE — CHEST DRAIN OASIS DRY SUCTION WATER SEAL

## (undated) DEVICE — FOLEY TRAY 16FR 5CC LF LUBRISIL ADVANCE TEMP CLOSED

## (undated) DEVICE — SUT BOOT STANDARD (YELLOW) 5 PAIR

## (undated) DEVICE — CONNECTOR STRAIGHT 3/8 X 3/8"

## (undated) DEVICE — SAW BLADE SYNVASIVE OSCILLATING

## (undated) DEVICE — BULLDOG SPRING CLIP LATIS/LATIS 6MM 1/2 FORCE (BLUE)

## (undated) DEVICE — GOWN TRIMAX XXL

## (undated) DEVICE — CONNECTOR STRAIGHT 3/8 X 1/2"

## (undated) DEVICE — TUBING TRUWAVE PRESSURE MALE/FEMALE 72"

## (undated) DEVICE — DRSG TEGADERM 6"X8"

## (undated) DEVICE — SAW BLADE MICROAIRE STERNUM 1X34X9.4MM

## (undated) DEVICE — SUT SOFSILK 0 30" TIES

## (undated) DEVICE — SYNOVIS VASCULAR PROBE 1.5MM 15CM

## (undated) DEVICE — DRSG STERISTRIPS 0.5 X 4"

## (undated) DEVICE — TUBING IV SET MICROCLAVE ADAPTER

## (undated) DEVICE — POSITIONER FOAM EGG CRATE ULNAR 2PCS (PINK)

## (undated) DEVICE — BEAVER BLADE MINI SHARP ALL ROUND (BLUE)

## (undated) DEVICE — SAW BLADE MICROAIRE OSCILLATING LG 0.9X64X33.5MM

## (undated) DEVICE — DRSG DERMABOND PRINEO 60CM

## (undated) DEVICE — DRSG DERMABOND PRINEO 22CM

## (undated) DEVICE — STAPLER SKIN VISI-STAT 35 WIDE

## (undated) DEVICE — TUBING SUCTION 20FT

## (undated) DEVICE — PACK CARDIAC YELLOW

## (undated) DEVICE — SUT STAINLESS STEEL 5 18" CCS

## (undated) DEVICE — SUT MONOCRYL 4-0 18" PS-2

## (undated) DEVICE — FILTER REINFUSION FOR SALVAGED BLOOD DISP